# Patient Record
Sex: MALE | Race: WHITE | NOT HISPANIC OR LATINO | Employment: FULL TIME | URBAN - METROPOLITAN AREA
[De-identification: names, ages, dates, MRNs, and addresses within clinical notes are randomized per-mention and may not be internally consistent; named-entity substitution may affect disease eponyms.]

---

## 2019-08-10 ENCOUNTER — APPOINTMENT (EMERGENCY)
Dept: CT IMAGING | Facility: HOSPITAL | Age: 44
End: 2019-08-10
Payer: COMMERCIAL

## 2019-08-10 ENCOUNTER — HOSPITAL ENCOUNTER (EMERGENCY)
Facility: HOSPITAL | Age: 44
Discharge: HOME | End: 2019-08-10
Attending: EMERGENCY MEDICINE | Admitting: EMERGENCY MEDICINE
Payer: COMMERCIAL

## 2019-08-10 VITALS
SYSTOLIC BLOOD PRESSURE: 138 MMHG | RESPIRATION RATE: 18 BRPM | TEMPERATURE: 98.8 F | DIASTOLIC BLOOD PRESSURE: 87 MMHG | OXYGEN SATURATION: 96 % | HEART RATE: 67 BPM | WEIGHT: 205.25 LBS

## 2019-08-10 DIAGNOSIS — K52.9 ENTERITIS: Primary | ICD-10-CM

## 2019-08-10 LAB
ALBUMIN SERPL BCP-MCNC: 4.1 G/DL (ref 3.5–5)
ALP SERPL-CCNC: 77 U/L (ref 46–116)
ALT SERPL W P-5'-P-CCNC: 38 U/L (ref 12–78)
ANION GAP SERPL CALCULATED.3IONS-SCNC: 10 MMOL/L (ref 4–13)
AST SERPL W P-5'-P-CCNC: 20 U/L (ref 5–45)
BACTERIA UR QL AUTO: ABNORMAL /HPF
BASOPHILS # BLD AUTO: 0.05 THOUSANDS/ΜL (ref 0–0.1)
BASOPHILS NFR BLD AUTO: 0 % (ref 0–1)
BILIRUB SERPL-MCNC: 0.7 MG/DL (ref 0.2–1)
BILIRUB UR QL STRIP: NEGATIVE
BUN SERPL-MCNC: 17 MG/DL (ref 5–25)
CALCIUM SERPL-MCNC: 9.9 MG/DL (ref 8.3–10.1)
CHLORIDE SERPL-SCNC: 104 MMOL/L (ref 100–108)
CLARITY UR: CLEAR
CO2 SERPL-SCNC: 26 MMOL/L (ref 21–32)
COLOR UR: YELLOW
CREAT SERPL-MCNC: 0.86 MG/DL (ref 0.6–1.3)
EOSINOPHIL # BLD AUTO: 0.05 THOUSAND/ΜL (ref 0–0.61)
EOSINOPHIL NFR BLD AUTO: 0 % (ref 0–6)
ERYTHROCYTE [DISTWIDTH] IN BLOOD BY AUTOMATED COUNT: 12.1 % (ref 11.6–15.1)
GFR SERPL CREATININE-BSD FRML MDRD: 107 ML/MIN/1.73SQ M
GLUCOSE SERPL-MCNC: 120 MG/DL (ref 65–140)
GLUCOSE UR STRIP-MCNC: NEGATIVE MG/DL
HCT VFR BLD AUTO: 48.9 % (ref 36.5–49.3)
HGB BLD-MCNC: 17 G/DL (ref 12–17)
HGB UR QL STRIP.AUTO: ABNORMAL
IMM GRANULOCYTES # BLD AUTO: 0.07 THOUSAND/UL (ref 0–0.2)
IMM GRANULOCYTES NFR BLD AUTO: 1 % (ref 0–2)
KETONES UR STRIP-MCNC: ABNORMAL MG/DL
LEUKOCYTE ESTERASE UR QL STRIP: NEGATIVE
LIPASE SERPL-CCNC: 105 U/L (ref 73–393)
LYMPHOCYTES # BLD AUTO: 1.06 THOUSANDS/ΜL (ref 0.6–4.47)
LYMPHOCYTES NFR BLD AUTO: 8 % (ref 14–44)
MCH RBC QN AUTO: 30.6 PG (ref 26.8–34.3)
MCHC RBC AUTO-ENTMCNC: 34.8 G/DL (ref 31.4–37.4)
MCV RBC AUTO: 88 FL (ref 82–98)
MONOCYTES # BLD AUTO: 0.84 THOUSAND/ΜL (ref 0.17–1.22)
MONOCYTES NFR BLD AUTO: 6 % (ref 4–12)
MUCOUS THREADS UR QL AUTO: ABNORMAL
NEUTROPHILS # BLD AUTO: 12.09 THOUSANDS/ΜL (ref 1.85–7.62)
NEUTS SEG NFR BLD AUTO: 85 % (ref 43–75)
NITRITE UR QL STRIP: NEGATIVE
NON-SQ EPI CELLS URNS QL MICRO: ABNORMAL /HPF
NRBC BLD AUTO-RTO: 0 /100 WBCS
PH UR STRIP.AUTO: 6.5 [PH] (ref 4.5–8)
PLATELET # BLD AUTO: 205 THOUSANDS/UL (ref 149–390)
PMV BLD AUTO: 9.5 FL (ref 8.9–12.7)
POTASSIUM SERPL-SCNC: 4 MMOL/L (ref 3.5–5.3)
PROT SERPL-MCNC: 7.9 G/DL (ref 6.4–8.2)
PROT UR STRIP-MCNC: ABNORMAL MG/DL
RBC # BLD AUTO: 5.55 MILLION/UL (ref 3.88–5.62)
RBC #/AREA URNS AUTO: ABNORMAL /HPF
SODIUM SERPL-SCNC: 140 MMOL/L (ref 136–145)
SP GR UR STRIP.AUTO: 1.02 (ref 1–1.03)
UROBILINOGEN UR QL STRIP.AUTO: 0.2 E.U./DL
WBC # BLD AUTO: 14.16 THOUSAND/UL (ref 4.31–10.16)
WBC #/AREA URNS AUTO: ABNORMAL /HPF

## 2019-08-10 PROCEDURE — 74177 CT ABD & PELVIS W/CONTRAST: CPT

## 2019-08-10 PROCEDURE — 99284 EMERGENCY DEPT VISIT MOD MDM: CPT

## 2019-08-10 PROCEDURE — 85025 COMPLETE CBC W/AUTO DIFF WBC: CPT | Performed by: EMERGENCY MEDICINE

## 2019-08-10 PROCEDURE — 36415 COLL VENOUS BLD VENIPUNCTURE: CPT | Performed by: EMERGENCY MEDICINE

## 2019-08-10 PROCEDURE — 81001 URINALYSIS AUTO W/SCOPE: CPT

## 2019-08-10 PROCEDURE — 80053 COMPREHEN METABOLIC PANEL: CPT | Performed by: EMERGENCY MEDICINE

## 2019-08-10 PROCEDURE — 83690 ASSAY OF LIPASE: CPT | Performed by: EMERGENCY MEDICINE

## 2019-08-10 PROCEDURE — 99284 EMERGENCY DEPT VISIT MOD MDM: CPT | Performed by: EMERGENCY MEDICINE

## 2019-08-10 RX ORDER — BISOPROLOL FUMARATE AND HYDROCHLOROTHIAZIDE 5; 6.25 MG/1; MG/1
1 TABLET ORAL
COMMUNITY

## 2019-08-10 RX ORDER — ALPRAZOLAM 0.5 MG/1
0.5 TABLET ORAL
COMMUNITY

## 2019-08-10 RX ADMIN — IOHEXOL 100 ML: 350 INJECTION, SOLUTION INTRAVENOUS at 11:43

## 2019-08-10 NOTE — ED PROVIDER NOTES
History  Chief Complaint   Patient presents with    Abdominal Pain     sent from  Urgent Care for further evaluation of mid abd pain and nausea, started early this am , woke pt from sleep   Patient is a 44-year-old male with history only of hypertension who presents to the emergency department for evaluation with abdominal discomfort  He notes that this started at 02:00 waking him today  He gestures to the central abdominal region and describes a stabbing and twisting sensation  He notes that the discomfort has improved-having initially been a 7/10 and now much milder discomfort  He was unable to find any comfortable position after the pain started and was unable to return to sleep  He did have nausea and dry heaves  Appetite has been absent today  He did not have any abnormal bowel movements or urinations  He does not believe he has had any fevers  He did consume pork chops last night  Others ate the same food and feel well  He is not aware of any bad food ingestions  He does not have any history of abdominal problems though suspects that he does have an umbilical hernia as he has noted some slight protrusion at the site over the last 1 year  No history of abdominal surgeries  He has not taken any medication for his discomfort and did initially [facility] after which she was referred in for additional evaluation with concern for possible appendicitis or alternate cause  Prior to Admission Medications   Prescriptions Last Dose Informant Patient Reported? Taking? ALPRAZolam (XANAX) 0 5 mg tablet 8/9/2019 at Unknown time  Yes Yes   Sig: Take 0 5 mg by mouth daily at bedtime as needed for anxiety   bisoprolol-hydrochlorothiazide (ZIAC) 5-6 25 MG per tablet 8/10/2019 at Unknown time  Yes Yes   Sig: Take 1 tablet by mouth daily      Facility-Administered Medications: None       Past Medical History:   Diagnosis Date    Hypertension        History reviewed   No pertinent surgical history  History reviewed  No pertinent family history  I have reviewed and agree with the history as documented  Social History     Tobacco Use    Smoking status: Current Every Day Smoker    Smokeless tobacco: Never Used   Substance Use Topics    Alcohol use: Not Currently    Drug use: Never        Review of Systems   All other systems reviewed and are negative  Physical Exam  Physical Exam   Constitutional: He is oriented to person, place, and time  He appears well-developed and well-nourished  Eyes: Conjunctivae and EOM are normal  No scleral icterus  Cardiovascular: Normal rate and regular rhythm  Pulmonary/Chest: Effort normal and breath sounds normal    Abdominal: Normal appearance and bowel sounds are normal  He exhibits no distension  There is tenderness in the right lower quadrant and epigastric area  There is no guarding and no CVA tenderness  Musculoskeletal: Normal range of motion  He exhibits no edema or tenderness  Neurological: He is alert and oriented to person, place, and time  Skin: Skin is warm and dry  Psychiatric: He has a normal mood and affect  His behavior is normal    Nursing note and vitals reviewed        Vital Signs  ED Triage Vitals [08/10/19 1028]   Temperature Pulse Respirations Blood Pressure SpO2   98 8 °F (37 1 °C) 67 18 138/87 96 %      Temp Source Heart Rate Source Patient Position - Orthostatic VS BP Location FiO2 (%)   Oral Monitor Lying Right arm --      Pain Score       --           Vitals:    08/10/19 1028   BP: 138/87   Pulse: 67   Patient Position - Orthostatic VS: Lying         Visual Acuity      ED Medications  Medications   iohexol (OMNIPAQUE) 350 MG/ML injection (MULTI-DOSE) 100 mL (100 mL Intravenous Given 8/10/19 1143)       Diagnostic Studies  Results Reviewed     Procedure Component Value Units Date/Time    Urine Microscopic [[id number]]  (Abnormal) Collected:  08/10/19 1129    Lab Status:  Final result Specimen:  Urine, Clean Catch Updated:  08/10/19 1157     RBC, UA 0-1 /hpf      WBC, UA None Seen /hpf      Epithelial Cells Occasional /hpf      Bacteria, UA Occasional /hpf      MUCUS THREADS Occasional    ED Urine Macroscopic [[id number]]  (Abnormal) Collected:  08/10/19 1129    Lab Status:  Final result Specimen:  Urine Updated:  08/10/19 1118     Color, UA Yellow     Clarity, UA Clear     pH, UA 6 5     Leukocytes, UA Negative     Nitrite, UA Negative     Protein, UA 30 (1+) mg/dl      Glucose, UA Negative mg/dl      Ketones, UA 15 (1+) mg/dl      Urobilinogen, UA 0 2 E U /dl      Bilirubin, UA Negative     Blood, UA Trace     Specific [place], UA 1 020    Narrative:       CLINITEK RESULT    Comprehensive metabolic panel [[id number]] Collected:  08/10/19 1052    Lab Status:  Final result Specimen:  Blood from Arm, Right Updated:  08/10/19 1111     Sodium 140 mmol/L      Potassium 4 0 mmol/L      Chloride 104 mmol/L      CO2 26 mmol/L      ANION GAP 10 mmol/L      BUN 17 mg/dL      Creatinine 0 86 mg/dL      Glucose 120 mg/dL      Calcium 9 9 mg/dL      AST 20 U/L      ALT 38 U/L      Alkaline Phosphatase 77 U/L      Total Protein 7 9 g/dL      Albumin 4 1 g/dL      Total Bilirubin 0 70 mg/dL      eGFR 107 ml/min/1 73sq m     Narrative:       [place] guidelines for Chronic Kidney Disease (CKD):     Stage 1 with normal or high GFR (GFR > 90 mL/min/1 73 square meters)    Stage 2 Mild CKD (GFR = 60-89 mL/min/1 73 square meters)    Stage 3A Moderate CKD (GFR = 45-59 mL/min/1 73 square meters)    Stage 3B Moderate CKD (GFR = 30-44 mL/min/1 73 square meters)    Stage 4 Severe CKD (GFR = 15-29 mL/min/1 73 square meters)    Stage 5 End Stage CKD (GFR <15 mL/min/1 73 square meters)  Note: GFR calculation is accurate only with a steady state creatinine    Lipase [[id number]]  (Normal) Collected:  08/10/19 1052    Lab Status:  Final result Specimen:  Blood from Arm, Right Updated:  08/10/19 1111     Lipase 105 u/L CBC and differential [[id number]]  (Abnormal) Collected:  08/10/19 1052    Lab Status:  Final result Specimen:  Blood from Arm, Right Updated:  08/10/19 1059     WBC 14 16 Thousand/uL      RBC 5 55 Million/uL      Hemoglobin 17 0 g/dL      Hematocrit 48 9 %      MCV 88 fL      MCH 30 6 pg      MCHC 34 8 g/dL      RDW 12 1 %      MPV 9 5 fL      Platelets 917 Thousands/uL      nRBC 0 /100 WBCs      Neutrophils Relative 85 %      Immat GRANS % 1 %      Lymphocytes Relative 8 %      Monocytes Relative 6 %      Eosinophils Relative 0 %      Basophils Relative 0 %      Neutrophils Absolute 12 09 Thousands/µL      Immature Grans Absolute 0 07 Thousand/uL      Lymphocytes Absolute 1 06 Thousands/µL      Monocytes Absolute 0 84 Thousand/µL      Eosinophils Absolute 0 05 Thousand/µL      Basophils Absolute 0 05 Thousands/µL                  CT abdomen pelvis with contrast   Final Result by Deloris Barker MD (08/10 1200)   Mild wall thickening of proximal small bowel suspicious for enteritis, likely infectious                     Workstation performed: [id number]                    Procedures  Procedures       ED Course      patient reported continued improvement in symptoms  I spoke with him regarding study results revealing evidence of enteritis  Patient aware to gradually advance his diet  He declines antiemetic prescription  Although no PCP is listed in his chart he assures that he does have one & will contact them if he is having any worsening in symptoms                            MDM    Disposition  Final diagnoses:   Enteritis     Time reflects when diagnosis was documented in both MDM as applicable and the Disposition within this note     Time User Action Codes Description Comment    8/10/2019 12:04 PM Emily LOCKETT Add [K52 9] Enteritis       ED Disposition     ED Disposition Condition Date/Time Comment    Discharge Stable Sat Aug 10, 2019 12:04  [address] discharge to home/self care             Follow-up Information     Follow up With Specialties Details Why Contact Info    your primary care physician   As needed if not improving over the next week           Discharge Medication List as of 8/10/2019 12:11 PM      CONTINUE these medications which have NOT CHANGED    Details   ALPRAZolam (XANAX) 0 5 mg tablet Take 0 5 mg by mouth daily at bedtime as needed for anxiety, Historical Med      bisoprolol-hydrochlorothiazide (ZIAC) 5-6 25 MG per tablet Take 1 tablet by mouth daily, Historical Med           No discharge procedures on file      ED Provider  Electronically Signed by           Isiah Hart MD  08/10/19 3399

## 2019-08-10 NOTE — DISCHARGE INSTRUCTIONS
Drink plenty of fluids to stay well hydrated  Gradually advance your diet to plain foods & ultimately regular foods over the next few days as you feel improved  Enteritis   WHAT YOU NEED TO KNOW:   Enteritis is inflammation of the small intestine  It may be caused by eating foods or drinking liquids contaminated with a virus, bacteria, or parasites  It may also be caused by certain medicines, damage from radiation, and medical conditions such as Crohn disease  DISCHARGE INSTRUCTIONS:   Seek care immediately if:   You cannot stop vomiting  You have not urinated for 12 hours  Contact your healthcare provider if:   You have a fever over 101 5  You have blood or mucus in your bowel movements  You continue to vomit or have diarrhea for more than 3 days, even after treatment  You have a dry mouth and eyes, you are urinating less than usual, and you feel dizzy when you stand up  Your mouth or eyes are dry  You are not urinating as much or as often  You are losing weight without trying  You have questions or concerns about your condition or care  Medicines:   Medicines  may be given to fight an infection caused by bacteria or a parasite  You may also need medicines to slow or stop your diarrhea or vomiting  Do not take these medicines unless your healthcare provider say it is okay  Other medicines may be needed to treat medical conditions that are causing enteritis  Take your medicine as directed  Contact your healthcare provider if you think your medicine is not helping or if you have side effects  Tell him of her if you are allergic to any medicine  Keep a list of the medicines, vitamins, and herbs you take  Include the amounts, and when and why you take them  Bring the list or the pill bottles to follow-up visits  Carry your medicine list with you in case of an emergency  Manage enteritis:   Eat foods that help to decrease symptoms    Limit or avoid foods and liquids that are high in sugar, fat, and fiber to help relieve diarrhea  It may be helpful to avoid lactose  Lactose is a type of sugar that is found in milk products  You may be able to tolerate soups, broths, well-cooked vegetables, canned fruit, and baked or broiled meats  Ask your dietitian or healthcare provider if you should follow a special diet  You may need to avoid other foods if you have certain medical conditions such as celiac disease  Drink liquids as directed  Ask how much liquid to drink each day and which liquids are best for you  It is important to prevent or treat dehydration  Even if you have been vomiting, suck on ice chips or take small sips of clear liquids often  Slowly increase the amount of clear liquids you drink  If you become dehydrated, you may need IV liquids  Drink an oral rehydration solution (ORS) as directed  An ORS contains water, salts, and sugar that are needed to replace lost body fluids  Ask what kind of ORS to use, how much to drink, and where to get it  Prevent enteritis:  Enteritis that is caused by bacteria, parasites, or viruses can be prevented  The following may help to prevent this type of enteritis:  Wash your hands often  Use soap and water  Wash your hands after you use the bathroom, change a child's diapers, or sneeze  Wash your hands before you prepare or eat food  Clean surfaces and do laundry often  Wash your clothes and towels separately from the rest of the laundry  Clean surfaces in your home with antibacterial  or bleach  Clean food thoroughly and cook safely  Wash raw vegetables before you cook  Cook meat, fish, and eggs fully  Do not use the same dishes for raw meat as you do for other foods  Refrigerate any leftover food immediately  Be aware when you camp or travel  Drink only clean water  Do not drink from rivers or lakes unless you purify or boil the water first  When you travel, drink bottled water and do not add ice   Do not eat fruit that has not been peeled  Do not eat raw fish or meat that is not fully cooked  Follow up with your healthcare provider as directed:  Write down your questions so you remember to ask them during your visits  © 2017 [employer] Information is for End User's use only and may not be sold, redistributed or otherwise used for commercial purposes  All illustrations and images included in CareNotes® are the copyrighted property of A [employer]  or [employer]  The above information is an  only  It is not intended as medical advice for individual conditions or treatments  Talk to your doctor, nurse or pharmacist before following any medical regimen to see if it is safe and effective for you

## 2021-03-10 ENCOUNTER — APPOINTMENT (OUTPATIENT)
Dept: LAB | Facility: MEDICAL CENTER | Age: 46
End: 2021-03-10
Payer: COMMERCIAL

## 2021-03-10 DIAGNOSIS — H93.13 TINNITUS AURIUM, BILATERAL: ICD-10-CM

## 2021-03-10 DIAGNOSIS — E66.3 OVERWEIGHT: ICD-10-CM

## 2021-03-10 DIAGNOSIS — H90.3 BILATERAL HIGH FREQUENCY SENSORINEURAL HEARING LOSS: ICD-10-CM

## 2021-03-10 DIAGNOSIS — Z23 ENCOUNTER FOR IMMUNIZATION: ICD-10-CM

## 2021-03-10 LAB
ALBUMIN SERPL BCP-MCNC: 4.1 G/DL (ref 3.5–5)
ALP SERPL-CCNC: 78 U/L (ref 46–116)
ALT SERPL W P-5'-P-CCNC: 39 U/L (ref 12–78)
ANION GAP SERPL CALCULATED.3IONS-SCNC: 5 MMOL/L (ref 4–13)
AST SERPL W P-5'-P-CCNC: 17 U/L (ref 5–45)
BASOPHILS # BLD AUTO: 0.04 THOUSANDS/ΜL (ref 0–0.1)
BASOPHILS NFR BLD AUTO: 1 % (ref 0–1)
BILIRUB SERPL-MCNC: 0.79 MG/DL (ref 0.2–1)
BUN SERPL-MCNC: 16 MG/DL (ref 5–25)
CALCIUM SERPL-MCNC: 9.2 MG/DL (ref 8.3–10.1)
CHLORIDE SERPL-SCNC: 108 MMOL/L (ref 100–108)
CHOLEST SERPL-MCNC: 204 MG/DL (ref 50–200)
CO2 SERPL-SCNC: 27 MMOL/L (ref 21–32)
CREAT SERPL-MCNC: 1.01 MG/DL (ref 0.6–1.3)
EOSINOPHIL # BLD AUTO: 0.23 THOUSAND/ΜL (ref 0–0.61)
EOSINOPHIL NFR BLD AUTO: 3 % (ref 0–6)
ERYTHROCYTE [DISTWIDTH] IN BLOOD BY AUTOMATED COUNT: 12.3 % (ref 11.6–15.1)
GFR SERPL CREATININE-BSD FRML MDRD: 91 ML/MIN/1.73SQ M
GLUCOSE P FAST SERPL-MCNC: 105 MG/DL (ref 65–99)
HCT VFR BLD AUTO: 49.5 % (ref 36.5–49.3)
HDLC SERPL-MCNC: 34 MG/DL
HGB BLD-MCNC: 16.7 G/DL (ref 12–17)
IMM GRANULOCYTES # BLD AUTO: 0.03 THOUSAND/UL (ref 0–0.2)
IMM GRANULOCYTES NFR BLD AUTO: 0 % (ref 0–2)
LDLC SERPL CALC-MCNC: 135 MG/DL (ref 0–100)
LYMPHOCYTES # BLD AUTO: 1.57 THOUSANDS/ΜL (ref 0.6–4.47)
LYMPHOCYTES NFR BLD AUTO: 22 % (ref 14–44)
MCH RBC QN AUTO: 30.1 PG (ref 26.8–34.3)
MCHC RBC AUTO-ENTMCNC: 33.7 G/DL (ref 31.4–37.4)
MCV RBC AUTO: 89 FL (ref 82–98)
MONOCYTES # BLD AUTO: 0.65 THOUSAND/ΜL (ref 0.17–1.22)
MONOCYTES NFR BLD AUTO: 9 % (ref 4–12)
NEUTROPHILS # BLD AUTO: 4.67 THOUSANDS/ΜL (ref 1.85–7.62)
NEUTS SEG NFR BLD AUTO: 65 % (ref 43–75)
NONHDLC SERPL-MCNC: 170 MG/DL
NRBC BLD AUTO-RTO: 0 /100 WBCS
PLATELET # BLD AUTO: 205 THOUSANDS/UL (ref 149–390)
PMV BLD AUTO: 10.3 FL (ref 8.9–12.7)
POTASSIUM SERPL-SCNC: 4.1 MMOL/L (ref 3.5–5.3)
PROT SERPL-MCNC: 7.7 G/DL (ref 6.4–8.2)
RBC # BLD AUTO: 5.55 MILLION/UL (ref 3.88–5.62)
SODIUM SERPL-SCNC: 140 MMOL/L (ref 136–145)
TRIGL SERPL-MCNC: 177 MG/DL
WBC # BLD AUTO: 7.19 THOUSAND/UL (ref 4.31–10.16)

## 2021-03-10 PROCEDURE — 85025 COMPLETE CBC W/AUTO DIFF WBC: CPT

## 2021-03-10 PROCEDURE — 80053 COMPREHEN METABOLIC PANEL: CPT

## 2021-03-10 PROCEDURE — 80061 LIPID PANEL: CPT

## 2021-03-10 PROCEDURE — 36415 COLL VENOUS BLD VENIPUNCTURE: CPT

## 2021-09-13 ENCOUNTER — HOSPITAL ENCOUNTER (OUTPATIENT)
Dept: CT IMAGING | Facility: HOSPITAL | Age: 46
Discharge: HOME | End: 2021-09-13
Attending: INTERNAL MEDICINE
Payer: COMMERCIAL

## 2021-09-13 ENCOUNTER — APPOINTMENT (OUTPATIENT)
Dept: LAB | Facility: CLINIC | Age: 46
End: 2021-09-13
Payer: COMMERCIAL

## 2021-09-13 ENCOUNTER — CONSULT (OUTPATIENT)
Dept: HEMATOLOGY ONCOLOGY | Facility: CLINIC | Age: 46
End: 2021-09-13
Payer: COMMERCIAL

## 2021-09-13 VITALS
RESPIRATION RATE: 16 BRPM | OXYGEN SATURATION: 96 % | DIASTOLIC BLOOD PRESSURE: 108 MMHG | HEIGHT: 67 IN | BODY MASS INDEX: 29.98 KG/M2 | SYSTOLIC BLOOD PRESSURE: 142 MMHG | WEIGHT: 191 LBS | TEMPERATURE: 97.2 F | HEART RATE: 65 BPM

## 2021-09-13 DIAGNOSIS — R59.0 MEDIASTINAL LYMPHADENOPATHY: ICD-10-CM

## 2021-09-13 DIAGNOSIS — R59.0 MEDIASTINAL LYMPHADENOPATHY: Primary | ICD-10-CM

## 2021-09-13 DIAGNOSIS — C83.30 DIFFUSE LARGE B-CELL LYMPHOMA, UNSPECIFIED BODY REGION (HCC): ICD-10-CM

## 2021-09-13 LAB
AFP-TM SERPL-MCNC: 3.8 NG/ML (ref 0.5–8)
ALBUMIN SERPL BCP-MCNC: 3.8 G/DL (ref 3.5–5)
ALP SERPL-CCNC: 143 U/L (ref 46–116)
ALT SERPL W P-5'-P-CCNC: 40 U/L (ref 12–78)
ANION GAP SERPL CALCULATED.3IONS-SCNC: 7 MMOL/L (ref 4–13)
AST SERPL W P-5'-P-CCNC: 14 U/L (ref 5–45)
BASOPHILS # BLD AUTO: 0.08 THOUSANDS/ΜL (ref 0–0.1)
BASOPHILS NFR BLD AUTO: 1 % (ref 0–1)
BILIRUB SERPL-MCNC: 0.53 MG/DL (ref 0.2–1)
BUN SERPL-MCNC: 12 MG/DL (ref 5–25)
CALCIUM SERPL-MCNC: 9.6 MG/DL (ref 8.3–10.1)
CHLORIDE SERPL-SCNC: 102 MMOL/L (ref 100–108)
CO2 SERPL-SCNC: 30 MMOL/L (ref 21–32)
CREAT SERPL-MCNC: 1.05 MG/DL (ref 0.6–1.3)
EOSINOPHIL # BLD AUTO: 0.55 THOUSAND/ΜL (ref 0–0.61)
EOSINOPHIL NFR BLD AUTO: 6 % (ref 0–6)
ERYTHROCYTE [DISTWIDTH] IN BLOOD BY AUTOMATED COUNT: 12 % (ref 11.6–15.1)
GFR SERPL CREATININE-BSD FRML MDRD: 86 ML/MIN/1.73SQ M
GLUCOSE SERPL-MCNC: 142 MG/DL (ref 65–140)
HBV SURFACE AG SER QL: NORMAL
HCG-TM SERPL-SCNC: <2 MLU/ML
HCT VFR BLD AUTO: 48 % (ref 36.5–49.3)
HCV AB SER QL: NORMAL
HGB BLD-MCNC: 15.9 G/DL (ref 12–17)
IMM GRANULOCYTES # BLD AUTO: 0.06 THOUSAND/UL (ref 0–0.2)
IMM GRANULOCYTES NFR BLD AUTO: 1 % (ref 0–2)
LDH SERPL-CCNC: 242 U/L (ref 81–234)
LYMPHOCYTES # BLD AUTO: 0.74 THOUSANDS/ΜL (ref 0.6–4.47)
LYMPHOCYTES NFR BLD AUTO: 8 % (ref 14–44)
MCH RBC QN AUTO: 28.4 PG (ref 26.8–34.3)
MCHC RBC AUTO-ENTMCNC: 33.1 G/DL (ref 31.4–37.4)
MCV RBC AUTO: 86 FL (ref 82–98)
MONOCYTES # BLD AUTO: 1.16 THOUSAND/ΜL (ref 0.17–1.22)
MONOCYTES NFR BLD AUTO: 12 % (ref 4–12)
NEUTROPHILS # BLD AUTO: 6.74 THOUSANDS/ΜL (ref 1.85–7.62)
NEUTS SEG NFR BLD AUTO: 72 % (ref 43–75)
NRBC BLD AUTO-RTO: 0 /100 WBCS
PLATELET # BLD AUTO: 354 THOUSANDS/UL (ref 149–390)
PMV BLD AUTO: 8.9 FL (ref 8.9–12.7)
POTASSIUM SERPL-SCNC: 4.3 MMOL/L (ref 3.5–5.3)
PROT SERPL-MCNC: 8.4 G/DL (ref 6.4–8.2)
RBC # BLD AUTO: 5.59 MILLION/UL (ref 3.88–5.62)
SODIUM SERPL-SCNC: 139 MMOL/L (ref 136–145)
WBC # BLD AUTO: 9.33 THOUSAND/UL (ref 4.31–10.16)

## 2021-09-13 PROCEDURE — 86803 HEPATITIS C AB TEST: CPT

## 2021-09-13 PROCEDURE — 36415 COLL VENOUS BLD VENIPUNCTURE: CPT

## 2021-09-13 PROCEDURE — 85025 COMPLETE CBC W/AUTO DIFF WBC: CPT

## 2021-09-13 PROCEDURE — 87389 HIV-1 AG W/HIV-1&-2 AB AG IA: CPT

## 2021-09-13 PROCEDURE — 82105 ALPHA-FETOPROTEIN SERUM: CPT

## 2021-09-13 PROCEDURE — 99245 OFF/OP CONSLTJ NEW/EST HI 55: CPT | Performed by: INTERNAL MEDICINE

## 2021-09-13 PROCEDURE — 80053 COMPREHEN METABOLIC PANEL: CPT

## 2021-09-13 PROCEDURE — G1004 CDSM NDSC: HCPCS

## 2021-09-13 PROCEDURE — 84702 CHORIONIC GONADOTROPIN TEST: CPT

## 2021-09-13 PROCEDURE — 83615 LACTATE (LD) (LDH) ENZYME: CPT

## 2021-09-13 PROCEDURE — 87340 HEPATITIS B SURFACE AG IA: CPT

## 2021-09-13 PROCEDURE — 70491 CT SOFT TISSUE NECK W/DYE: CPT

## 2021-09-13 RX ORDER — PREDNISONE 50 MG/1
50 TABLET ORAL 2 TIMES DAILY WITH MEALS
Qty: 30 TABLET | Refills: 0 | Status: SHIPPED | OUTPATIENT
Start: 2021-09-13 | End: 2021-09-27 | Stop reason: SDUPTHER

## 2021-09-13 RX ADMIN — IOHEXOL 85 ML: 350 INJECTION, SOLUTION INTRAVENOUS at 11:34

## 2021-09-13 NOTE — PROGRESS NOTES
Oncology Consult Note  Steven Tolbert 42 y o  male MRN: [id number]  Unit/Bed#:  Encounter: [id number]      Presenting Complaint:  Mediastinal lymphadenopathy, supraclavicular lymphadenopathy  History of Presenting Illness:   44-year-old  male with history of hypertension, anxiety, had been feeling with intermittent pleurisy for the past few months, later on he had shortness of breath when he goes up stairs, in the past 1-2 weeks CEA had low-grade fever at night, night sweats, he lost about 5-10 lb    Chest x-ray on 09/10/2021  Widening of the mediastinum    CT scan of the chest showed large anterior mediastinal mass measuring 11 4 x 6 1 x 6 7 cm with multiple enlarged mediastinal lymph nodes for example a prevascular lymph node measuring 2 4 x 1 6 cm, right paratracheal lymph node measuring 1 4 x 2 8 cm, bilateral supraclavicular lymphadenopathy, subcentimeter right hilar lymphadenopathy no axillary lymph node enlargement     Denies any headache blurred vision diplopia angina abdominal pain dysuria hematuria melena hematochezia skin rash pruritus     He smokes half pack of cigarettes daily for many years, drinks alcohol occasionally    His father diagnosed with multiple myeloma       Review of Systems - As stated in the HPI otherwise the fourteen point review of systems was negative      Past Medical History:   Diagnosis Date    Hypertension        Social History     Socioeconomic History    Marital status: /Civil Union     Spouse name: None    Number of children: None    Years of education: None    Highest education level: None   Occupational History    None   Tobacco Use    Smoking status: Current Every Day Smoker    Smokeless tobacco: Never Used   Substance and Sexual Activity    Alcohol use: Yes     Comment: rarely    Drug use: Never    Sexual activity: None   Other Topics Concern    None   Social History Narrative    None     Social Determinants of Health     Financial Resource Strain:     Difficulty of Paying Living Expenses:    Food Insecurity:     Worried About Running Out of Food in the Last Year:     [address] in the Last Year:    Transportation Needs:     Lack of Transportation (Medical):  Lack of Transportation (Non-Medical):    Physical Activity:     Days of Exercise per Week:     Minutes of Exercise per Session:    Stress:     Feeling of Stress :    Social Connections:     Frequency of Communication with Friends and Family:     Frequency of Social Gatherings with Friends and Family:     Attends [employer] Services:     Active Member of Clubs or Organizations:     Attends Club or Organization Meetings:     Marital Status:    Intimate Partner Violence:     Fear of Current or Ex-Partner:     Emotionally Abused:     Physically Abused:     Sexually Abused:        Family History   Problem Relation Age of Onset    Hypertension Family     Cancer Family        No Known Allergies      Current Outpatient Medications:     ALPRAZolam (XANAX) 0 5 mg tablet, Take 0 5 mg by mouth daily at bedtime as needed for anxiety, Disp: , Rfl:     bisoprolol-hydrochlorothiazide (ZIAC) 5-6 25 MG per tablet, Take 1 tablet by mouth daily, Disp: , Rfl:     predniSONE 50 mg tablet, Take 1 tablet (50 mg total) by mouth 2 (two) times a day with meals, Disp: 30 tablet, Rfl: 0      BP (!) 142/108 (BP Location: Left arm, Patient Position: Sitting, Cuff Size: Adult)   Pulse 65   Temp (!) 97 2 °F (36 2 °C) (Temporal)   Resp 16   Ht 5' 7" (1 702 m)   Wt 86 6 kg (191 lb)   SpO2 96%   BMI 29 91 kg/m²       General Appearance:    Alert, oriented        Eyes:    PERRL   Ears:    Normal external ear canals, both ears   Nose:   Nares normal, septum midline   Throat:   Mucosa moist  Pharynx without injection      Neck:   Supple       Lungs:     Clear to auscultation bilaterally   Chest Wall:    No tenderness or deformity    Heart:    Regular rate and rhythm       Abdomen:     Soft, non-tender, bowel sounds +, no organomegaly           Extremities:   Extremities no cyanosis or edema       Skin:   no rash or icterus  Lymph nodes:   Palpable right supraclavicular lymph node measuring 1 1 x 2 cm otherwise,  Axillary, inguinal lymph nodes are within normal limits   Neurologic:   CNII-XII intact, normal strength, sensation and reflexes     Throughout               No results found for this or any previous visit (from the past 48 hour(s))  No results found    ECOG :0      Assessment and plan:     53-year-old  male with past medical history of hypertension, he smokes half pack of cigarettes daily for many years, presented with constitutional symptoms such as low-grade fever, night sweats, weight loss over the past few weeks, chest x-ray showed wide mediastinum, CT scan at [facility] on 09/2021 showed 11 x 6 1 x 6 7 cm anterior mediastinal mass, multiple enlarged mediastinal lymph nodes are present as well, bilateral supraclavicular lymphadenopathy as well, no evidence of axillary lymphadenopathy     Differential diagnosis includes lymphoproliferative disorder, sarcoidosis, germ cell tumor, thymoma    Will ask Surgical Oncology for excisional biopsy of 1 of the supraclavicular lymph nodes    Will order CBC, CMP, LDH, alpha fetoprotein, hCG, CT scan of the neck, HIV, hepatitis profile    PET scan as well after tissue biopsy to confirm diagnosis     Follow-up in 2 weeks     After excisional biopsy patient to take prednisone 50 mg p o  b i d  for 5 days    Will obtain CT scan of the chest CD from [facility]

## 2021-09-14 ENCOUNTER — CONSULT (OUTPATIENT)
Dept: SURGICAL ONCOLOGY | Facility: CLINIC | Age: 46
End: 2021-09-14
Payer: COMMERCIAL

## 2021-09-14 ENCOUNTER — ANESTHESIA EVENT (OUTPATIENT)
Dept: PERIOP | Facility: HOSPITAL | Age: 46
End: 2021-09-14
Payer: COMMERCIAL

## 2021-09-14 ENCOUNTER — TELEPHONE (OUTPATIENT)
Dept: HEMATOLOGY ONCOLOGY | Facility: CLINIC | Age: 46
End: 2021-09-14

## 2021-09-14 VITALS
WEIGHT: 192 LBS | SYSTOLIC BLOOD PRESSURE: 118 MMHG | BODY MASS INDEX: 30.13 KG/M2 | TEMPERATURE: 98.8 F | HEART RATE: 93 BPM | HEIGHT: 67 IN | RESPIRATION RATE: 16 BRPM | DIASTOLIC BLOOD PRESSURE: 74 MMHG | OXYGEN SATURATION: 98 %

## 2021-09-14 DIAGNOSIS — R59.0 SUPRACLAVICULAR LYMPHADENOPATHY: Primary | ICD-10-CM

## 2021-09-14 PROCEDURE — 99244 OFF/OP CNSLTJ NEW/EST MOD 40: CPT | Performed by: STUDENT IN AN ORGANIZED HEALTH CARE EDUCATION/TRAINING PROGRAM

## 2021-09-14 RX ORDER — CEFAZOLIN SODIUM 2 G/50ML
2000 SOLUTION INTRAVENOUS ONCE
Status: CANCELLED | OUTPATIENT
Start: 2021-09-14 | End: 2021-09-14

## 2021-09-14 RX ORDER — TRAMADOL HYDROCHLORIDE 50 MG/1
50 TABLET ORAL EVERY 6 HOURS PRN
Qty: 12 TABLET | Refills: 0 | Status: SHIPPED | OUTPATIENT
Start: 2021-09-14 | End: 2021-10-06

## 2021-09-14 NOTE — PROGRESS NOTES
Surgical Oncology Consultation    [id number] [facility] CANCER CARE SURGICAL ONCOLOGY [facility]  [address] [address]     Patient:  Tawny Brady  1975  [id number]    Primary Care provider:  Theresa Alfaro MD   [address] [address]    Referring provider:  No referring provider defined for this encounter  Diagnoses and all orders for this visit:    Supraclavicular lymphadenopathy  -     Case request operating room: EXCISIONAL BIOPSY OF RIGHT NECK LYMPH NODE WITH LYMPHOMA PROTOCOL; Standing  -     traMADol (ULTRAM) 50 mg tablet; Take 1 tablet (50 mg total) by mouth every 6 (six) hours as needed for moderate pain  -     Case request operating room: EXCISIONAL BIOPSY OF RIGHT NECK LYMPH NODE WITH LYMPHOMA PROTOCOL    Other orders  -     Incentive spirometry; Standing  -     Insert and maintain IV line; Standing  -     Void On-Call to O R ; Standing  -     Place sequential compression device; Standing  -     ceFAZolin (ANCEF) IVPB (premix in dextrose) 2,000 mg 50 mL  -     Cancel: EKG 12 lead; Future        Chief Complaint   Patient presents with    Lymphadenopathy       No follow-ups on file  Oncology History    No history exists  History of Present Illness  : This is a 68-year-old gentleman with a new diagnosis of likely lymphoma  Briefly, couple weeks ago the patient was having some chest pressure, and underwent a chest x-ray revealing wide mediastinum  This was followed by CT scan, which revealed a large anterior mediastinal mass as well as bilateral supraclavicular lymphadenopathy  He saw Dr Hill motley, his suspected lymphoma diagnosis, and refer the patient to me for an excisional lymph node biopsy  The patient states that he has had significant fatigue over the last couple of weeks  He also has a decreased appetite with mild weight loss to 5 lb    He denies any night sweats, chills, fevers, bone pain, shortness of breath  Review of Systems  Complete ROS Surg Onc:   Constitutional: The ENDORSES recent history of general fatigue, mild recent weight loss, DECREASED appetite  Eyes: No complaints of visual problems, no scleral icterus  ENT: No complaints of ear pain, no hoarseness, no difficulty swallowing,  no tinnitus and no new masses in head, oral cavity, or neck  Cardiovascular: No complaints of chest pain, no palpitations, no ankle edema  Respiratory: No complaints of shortness of breath, no cough  Gastrointestinal: No complaints of jaundice, no bloody stools, no pale stools  Genitourinary: No complaints of dysuria, no hematuria, no nocturia, no frequent urination, no urethral discharge  Musculoskeletal: No complaints of weakness, paralysis, joint stiffness or arthralgias  Integumentary: No complaints of rash, no new lesions  Neurological: No complaints of convulsions, no seizures, no dizziness  Hematologic/Lymphatic: No complaints of easy bruising  Endocrine:  No hot or cold intolerance  No polydipsia, polyphagia, or polyuria  Allergy/immunology:  No environmental allergies  No food allergies  Not immunocompromised        Patient Active Problem List   Diagnosis    Supraclavicular lymphadenopathy     Past Medical History:   Diagnosis Date    Hypertension      Past Surgical History:   Procedure Laterality Date    TOOTH EXTRACTION  02/16/2021     Family History   Problem Relation Age of Onset    Hypertension Family     Cancer Family      Social History     Socioeconomic History    Marital status: /Civil Union     Spouse name: Not on file    Number of children: Not on file    Years of education: Not on file    Highest education level: Not on file   Occupational History    Not on file   Tobacco Use    Smoking status: Current Every Day Smoker    Smokeless tobacco: Never Used   Substance and Sexual Activity    Alcohol use: Yes     Comment: rarely    Drug use: Never    Sexual activity: Not on file   Other Topics Concern    Not on file   Social History Narrative    Not on file     Social Determinants of Health     Financial Resource Strain:     Difficulty of Paying Living Expenses:    Food Insecurity:     Worried About Running Out of Food in the Last Year:     [address] in the Last Year:    Transportation Needs:     Lack of Transportation (Medical):  Lack of Transportation (Non-Medical):    Physical Activity:     Days of Exercise per Week:     Minutes of Exercise per Session:    Stress:     Feeling of Stress :    Social Connections:     Frequency of Communication with Friends and Family:     Frequency of Social Gatherings with Friends and Family:     Attends Denominational Services:     Active Member of Clubs or Organizations:     Attends Club or Organization Meetings:     Marital Status:    Intimate Partner Violence:     Fear of Current or Ex-Partner:     Emotionally Abused:     Physically Abused:     Sexually Abused:        Current Outpatient Medications:     ALPRAZolam (XANAX) 0 5 mg tablet, Take 0 5 mg by mouth daily at bedtime as needed for anxiety, Disp: , Rfl:     bisoprolol-hydrochlorothiazide (ZIAC) 5-6 25 MG per tablet, Take 1 tablet by mouth daily, Disp: , Rfl:     predniSONE 50 mg tablet, Take 1 tablet (50 mg total) by mouth 2 (two) times a day with meals (Patient not taking: Reported on 9/14/2021), Disp: 30 tablet, Rfl: 0    traMADol (ULTRAM) 50 mg tablet, Take 1 tablet (50 mg total) by mouth every 6 (six) hours as needed for moderate pain, Disp: 12 tablet, Rfl: 0  No current facility-administered medications for this visit    No Known Allergies    Vitals:    09/14/21 0855   BP: 118/74   Pulse: 93   Resp: 16   Temp: 98 8 °F (37 1 °C)   SpO2: 98%       Physical Exam   General: Appears well, appears stated age  Skin: Warm, anicteric  HEENT: Normocephalic, atraumatic; sclera aniceteric, mucous membranes moist; cervical nodes without adenopathy  Cardiopulmonary: RRR, Easy WOB, no BLE edema  Abd: Flat and soft, nontender, no masses appreciated, no hepatosplenomegaly  MSK: Symmetric, no cyanosis, no overt weakness  Lymphatic: No cervical, axillary or inguinal lymphadenopathy  Right supraclavicular node palpated at the medial aspect of the clavicle  Neuro: Affect appropriate, no gross motor abnormalities      Labs: Reviewed in EPIC    Imaging  CT soft tissue neck w contrast    Result Date: 9/13/2021  Narrative: CT NECK WITH CONTRAST INDICATION:   R59 0: Localized enlarged lymph nodes  COMPARISON:  None  TECHNIQUE:  Axial, sagittal, and coronal 2D reformatted images were created from the axial source data and submitted for interpretation  Radiation dose length product (DLP) for this visit:  891 mGy-cm   This examination, like all CT scans performed in the [facility], was performed utilizing techniques to minimize radiation dose exposure, including the use of iterative reconstruction and automated exposure control  IV Contrast:  85 mL of iohexol (OMNIPAQUE) IMAGE QUALITY:  Diagnostic  FINDINGS: VISUALIZED BRAIN PARENCHYMA:  No acute intracranial pathology of the visualized brain parenchyma  VISUALIZED ORBITS AND PARANASAL SINUSES:  Unremarkable orbits  Mild mucosal thickening of the maxillary sinuses  NASAL CAVITY AND NASOPHARYNX:  Normal  SUPRAHYOID NECK:  Normal oral cavity, tongue base, tonsillar fossa and epiglottis  INFRAHYOID NECK:  Aryepiglottic folds and piriform sinuses are normal   Normal glottis and subglottic airway  THYROID GLAND:  Unremarkable  PAROTID AND SUBMANDIBULAR GLANDS:  Normal  LYMPH NODES:  Within the neck there are small supraclavicular nodes demonstrating slightly heterogeneous enhancement  The largest of these on the left measures 2 4 x 1 5 cm and the largest on the right measures 2 2 x 1 8 cm  Small posterior jugular chain nodes are identified within the left neck, level 5   Evaluation of the upper mediastinum demonstrates a large heterogeneously enhancing mass within the anterior superior mediastinum, likely a large elvia mass  Multiple additional mediastinal nodes are seen in the anterior mediastinum, paratracheal, prevascular regions as well as suprahilar regions  VASCULAR STRUCTURES:  Normal enhancement of the cervical vasculature  THORACIC INLET:  See above description of mediastinal masses/adenopathy  The upper lung fields are clear  BONY STRUCTURES: No acute osseous abnormality  Impression: There are multiple supraclavicular nodes identified bilaterally measuring up to 2 4 cm in long axis  Additional small posterior jugular chain/level 5 nodes are present  Large mediastinal mass within the anterior superior mediastinum with multiple surrounding slightly heterogeneously enhancing enlarged lymph nodes extending inferiorly beyond the scope of this exam   Differential considerations would include lymphoma as well as other neoplastic etiologies  Prior outside imaging of the chest is not immediately available for comparison  No imaging of the abdomen or pelvis recently  Tissue sampling recommended  Workstation performed: [id number]       I independently reviewed and interpreted the above laboratory and imaging data  Discussion/Summary:    72-year-old gentleman with a diagnosis of an anterior mediastinal mass and significant cervical lymphadenopathy, most likely consistent with a lymphoma  The patient has been referred for an excisional lymph node biopsy  The right supraclavicular node is easily palpable and I will perform an excisional biopsy of this node  Risks 7 explained to the patient and his father today to include infection, bleeding, transient numbness or tingling  All questions answered today we will proceed with excisional lymph node biopsy

## 2021-09-14 NOTE — ANESTHESIA PREPROCEDURE EVALUATION
Procedure:  EXCISIONAL BIOPSY OF RIGHT NECK LYMPH NODE WITH LYMPHOMA PROTOCOL (Right Neck)    Large mediastinal mass  No visible airway compression on CT    Relevant Problems   ANESTHESIA (within normal limits)      CARDIO   (+) Hypertension      ENDO (within normal limits)      GI/HEPATIC (within normal limits)      /RENAL (within normal limits)      NEURO/PSYCH (within normal limits)      PULMONARY   (+) Smoking      Other   (+) Supraclavicular lymphadenopathy        Physical Exam    Airway    Mallampati score: I  TM Distance: >3 FB  Neck ROM: full     Dental       Cardiovascular      Pulmonary      Other Findings        Anesthesia Plan  ASA Score- 2     Anesthesia Type- general with ASA Monitors  Additional Monitors:   Airway Plan: LMA  Plan Factors-Exercise tolerance (METS): >4 METS  Chart reviewed  Imaging results reviewed  Existing labs reviewed  Patient summary reviewed  Patient is a current smoker  Patient instructed to abstain from smoking on day of procedure  Patient did not smoke on day of surgery  Induction- intravenous  Postoperative Plan- Plan for postoperative opioid use  Informed Consent- Anesthetic plan and risks discussed with patient  I personally reviewed this patient with the CRNA  Discussed and agreed on the Anesthesia Plan with the CRNA  Naomi Peterson

## 2021-09-14 NOTE — TELEPHONE ENCOUNTER
Received call from patient PCP stating they received fax from Dr Medellin Six office, but it appeared dede  Reached out to surgery coordinator Verónica Hanks for additional information  Verónica Hanks stated they need patient last EKG  Provided that info patient PCP they stated they will send EKG over

## 2021-09-14 NOTE — LETTER
September 14, 2021     Jaden Charles MD  [address] [place] [address]  [address] [phone]    Patient: Kristina Funez   YOB: 1975   Date of Visit: 9/14/2021       Dear Dr Gail Thorne: Thank you for referring Silverio Ribeiro to me for evaluation  Below are my notes for this consultation  If you have questions, please do not hesitate to call me  I look forward to following your patient along with you  Sincerely,        Bam Mora MD        CC: No Recipients  Bam Mora MD  9/14/2021 10:30 AM  Sign when Signing Visit  Surgical Oncology Consultation    [address] [place] [address]    Patient:  Kristina Funez  1975  [id number]    Primary Care provider:  Amina Collado MD   [address] [phone]    Referring provider:  No referring provider defined for this encounter  Diagnoses and all orders for this visit:    Supraclavicular lymphadenopathy  -     Case request operating room: EXCISIONAL BIOPSY OF RIGHT NECK LYMPH NODE WITH LYMPHOMA PROTOCOL; Standing  -     traMADol (ULTRAM) 50 mg tablet; Take 1 tablet (50 mg total) by mouth every 6 (six) hours as needed for moderate pain  -     Case request operating room: EXCISIONAL BIOPSY OF RIGHT NECK LYMPH NODE WITH LYMPHOMA PROTOCOL    Other orders  -     Incentive spirometry; Standing  -     Insert and maintain IV line; Standing  -     Void On-Call to O R ; Standing  -     Place sequential compression device; Standing  -     ceFAZolin (ANCEF) IVPB (premix in dextrose) 2,000 mg 50 mL  -     Cancel: EKG 12 lead; Future        Chief Complaint   Patient presents with    Lymphadenopathy       No follow-ups on file  Oncology History    No history exists  History of Present Illness  : This is a 24-year-old gentleman with a new diagnosis of likely lymphoma    Briefly, couple weeks ago the patient was having some chest pressure, and underwent a chest x-ray revealing wide mediastinum  This was followed by CT scan, which revealed a large anterior mediastinal mass as well as bilateral supraclavicular lymphadenopathy  He saw Dr Hill motley, his suspected lymphoma diagnosis, and refer the patient to me for an excisional lymph node biopsy  The patient states that he has had significant fatigue over the last couple of weeks  He also has a decreased appetite with mild weight loss to 5 lb  He denies any night sweats, chills, fevers, bone pain, shortness of breath  Review of Systems  Complete ROS Surg Onc:   Constitutional: The ENDORSES recent history of general fatigue, mild recent weight loss, DECREASED appetite  Eyes: No complaints of visual problems, no scleral icterus  ENT: No complaints of ear pain, no hoarseness, no difficulty swallowing,  no tinnitus and no new masses in head, oral cavity, or neck  Cardiovascular: No complaints of chest pain, no palpitations, no ankle edema  Respiratory: No complaints of shortness of breath, no cough  Gastrointestinal: No complaints of jaundice, no bloody stools, no pale stools  Genitourinary: No complaints of dysuria, no hematuria, no nocturia, no frequent urination, no urethral discharge  Musculoskeletal: No complaints of weakness, paralysis, joint stiffness or arthralgias  Integumentary: No complaints of rash, no new lesions  Neurological: No complaints of convulsions, no seizures, no dizziness  Hematologic/Lymphatic: No complaints of easy bruising  Endocrine:  No hot or cold intolerance  No polydipsia, polyphagia, or polyuria  Allergy/immunology:  No environmental allergies  No food allergies  Not immunocompromised        Patient Active Problem List   Diagnosis    Supraclavicular lymphadenopathy     Past Medical History:   Diagnosis Date    Hypertension      Past Surgical History:   Procedure Laterality Date    TOOTH EXTRACTION  02/16/2021 Family History   Problem Relation Age of Onset    Hypertension Family     Cancer Family      Social History     Socioeconomic History    Marital status: /Civil Union     Spouse name: Not on file    Number of children: Not on file    Years of education: Not on file    Highest education level: Not on file   Occupational History    Not on file   Tobacco Use    Smoking status: Current Every Day Smoker    Smokeless tobacco: Never Used   Substance and Sexual Activity    Alcohol use: Yes     Comment: rarely    Drug use: Never    Sexual activity: Not on file   Other Topics Concern    Not on file   Social History Narrative    Not on file     Social Determinants of Health     Financial Resource Strain:     Difficulty of Paying Living Expenses:    Food Insecurity:     Worried About Running Out of Food in the Last Year:     [address] in the Last Year:    Transportation Needs:     Lack of Transportation (Medical):      Lack of Transportation (Non-Medical):    Physical Activity:     Days of Exercise per Week:     Minutes of Exercise per Session:    Stress:     Feeling of Stress :    Social Connections:     Frequency of Communication with Friends and Family:     Frequency of Social Gatherings with Friends and Family:     Attends Jehovah's witness Services:     Active Member of Clubs or Organizations:     Attends Club or Organization Meetings:     Marital Status:    Intimate Partner Violence:     Fear of Current or Ex-Partner:     Emotionally Abused:     Physically Abused:     Sexually Abused:        Current Outpatient Medications:     ALPRAZolam (XANAX) 0 5 mg tablet, Take 0 5 mg by mouth daily at bedtime as needed for anxiety, Disp: , Rfl:     bisoprolol-hydrochlorothiazide (ZIAC) 5-6 25 MG per tablet, Take 1 tablet by mouth daily, Disp: , Rfl:     predniSONE 50 mg tablet, Take 1 tablet (50 mg total) by mouth 2 (two) times a day with meals (Patient not taking: Reported on 9/14/2021), Disp: 30 tablet, Rfl: 0    traMADol (ULTRAM) 50 mg tablet, Take 1 tablet (50 mg total) by mouth every 6 (six) hours as needed for moderate pain, Disp: 12 tablet, Rfl: 0  No current facility-administered medications for this visit  No Known Allergies    Vitals:    09/14/21 0855   BP: 118/74   Pulse: 93   Resp: 16   Temp: 98 8 °F (37 1 °C)   SpO2: 98%       Physical Exam   General: Appears well, appears stated age  Skin: Warm, anicteric  HEENT: Normocephalic, atraumatic; sclera aniceteric, mucous membranes moist; cervical nodes without adenopathy  Cardiopulmonary: RRR, Easy WOB, no BLE edema  Abd: Flat and soft, nontender, no masses appreciated, no hepatosplenomegaly  MSK: Symmetric, no cyanosis, no overt weakness  Lymphatic: No cervical, axillary or inguinal lymphadenopathy  Right supraclavicular node palpated at the medial aspect of the clavicle  Neuro: Affect appropriate, no gross motor abnormalities      Labs: Reviewed in EPIC    Imaging  CT soft tissue neck w contrast    Result Date: 9/13/2021  Narrative: CT NECK WITH CONTRAST INDICATION:   R59 0: Localized enlarged lymph nodes  COMPARISON:  None  TECHNIQUE:  Axial, sagittal, and coronal 2D reformatted images were created from the axial source data and submitted for interpretation  Radiation dose length product (DLP) for this visit:  891 mGy-cm   This examination, like all CT scans performed in the [facility], was performed utilizing techniques to minimize radiation dose exposure, including the use of iterative reconstruction and automated exposure control  IV Contrast:  85 mL of iohexol (OMNIPAQUE) IMAGE QUALITY:  Diagnostic  FINDINGS: VISUALIZED BRAIN PARENCHYMA:  No acute intracranial pathology of the visualized brain parenchyma  VISUALIZED ORBITS AND PARANASAL SINUSES:  Unremarkable orbits  Mild mucosal thickening of the maxillary sinuses   NASAL CAVITY AND NASOPHARYNX:  Normal  SUPRAHYOID NECK:  Normal oral cavity, tongue base, tonsillar fossa and epiglottis  INFRAHYOID NECK:  Aryepiglottic folds and piriform sinuses are normal   Normal glottis and subglottic airway  THYROID GLAND:  Unremarkable  PAROTID AND SUBMANDIBULAR GLANDS:  Normal  LYMPH NODES:  Within the neck there are small supraclavicular nodes demonstrating slightly heterogeneous enhancement  The largest of these on the left measures 2 4 x 1 5 cm and the largest on the right measures 2 2 x 1 8 cm  Small posterior jugular chain nodes are identified within the left neck, level 5  Evaluation of the upper mediastinum demonstrates a large heterogeneously enhancing mass within the anterior superior mediastinum, likely a large elvia mass  Multiple additional mediastinal nodes are seen in the anterior mediastinum, paratracheal, prevascular regions as well as suprahilar regions  VASCULAR STRUCTURES:  Normal enhancement of the cervical vasculature  THORACIC INLET:  See above description of mediastinal masses/adenopathy  The upper lung fields are clear  BONY STRUCTURES: No acute osseous abnormality  Impression: There are multiple supraclavicular nodes identified bilaterally measuring up to 2 4 cm in long axis  Additional small posterior jugular chain/level 5 nodes are present  Large mediastinal mass within the anterior superior mediastinum with multiple surrounding slightly heterogeneously enhancing enlarged lymph nodes extending inferiorly beyond the scope of this exam   Differential considerations would include lymphoma as well as other neoplastic etiologies  Prior outside imaging of the chest is not immediately available for comparison  No imaging of the abdomen or pelvis recently  Tissue sampling recommended  Workstation performed: [id number]       I independently reviewed and interpreted the above laboratory and imaging data        Discussion/Summary:    81-year-old gentleman with a diagnosis of an anterior mediastinal mass and significant cervical lymphadenopathy, most likely consistent with a lymphoma  The patient has been referred for an excisional lymph node biopsy  The right supraclavicular node is easily palpable and I will perform an excisional biopsy of this node  Risks 7 explained to the patient and his father today to include infection, bleeding, transient numbness or tingling  All questions answered today we will proceed with excisional lymph node biopsy

## 2021-09-15 ENCOUNTER — ANESTHESIA (OUTPATIENT)
Dept: PERIOP | Facility: HOSPITAL | Age: 46
End: 2021-09-15
Payer: COMMERCIAL

## 2021-09-15 ENCOUNTER — APPOINTMENT (OUTPATIENT)
Dept: RADIOLOGY | Facility: HOSPITAL | Age: 46
End: 2021-09-15
Payer: COMMERCIAL

## 2021-09-15 ENCOUNTER — HOSPITAL ENCOUNTER (OUTPATIENT)
Facility: HOSPITAL | Age: 46
Setting detail: OUTPATIENT SURGERY
Discharge: HOME | End: 2021-09-15
Attending: STUDENT IN AN ORGANIZED HEALTH CARE EDUCATION/TRAINING PROGRAM | Admitting: STUDENT IN AN ORGANIZED HEALTH CARE EDUCATION/TRAINING PROGRAM
Payer: COMMERCIAL

## 2021-09-15 VITALS
TEMPERATURE: 97.5 F | RESPIRATION RATE: 18 BRPM | WEIGHT: 192 LBS | OXYGEN SATURATION: 95 % | BODY MASS INDEX: 30.13 KG/M2 | SYSTOLIC BLOOD PRESSURE: 130 MMHG | HEART RATE: 97 BPM | HEIGHT: 67 IN | DIASTOLIC BLOOD PRESSURE: 87 MMHG

## 2021-09-15 DIAGNOSIS — R59.0 SUPRACLAVICULAR LYMPHADENOPATHY: ICD-10-CM

## 2021-09-15 PROBLEM — IMO0001 SMOKING: Status: ACTIVE | Noted: 2021-09-15

## 2021-09-15 PROBLEM — F17.200 SMOKING: Status: ACTIVE | Noted: 2021-09-15

## 2021-09-15 LAB — HIV 1+2 AB+HIV1 P24 AG SERPL QL IA: NORMAL

## 2021-09-15 PROCEDURE — 88185 FLOWCYTOMETRY/TC ADD-ON: CPT

## 2021-09-15 PROCEDURE — 88341 IMHCHEM/IMCYTCHM EA ADD ANTB: CPT | Performed by: PATHOLOGY

## 2021-09-15 PROCEDURE — 71045 X-RAY EXAM CHEST 1 VIEW: CPT

## 2021-09-15 PROCEDURE — 88365 INSITU HYBRIDIZATION (FISH): CPT | Performed by: PATHOLOGY

## 2021-09-15 PROCEDURE — 88342 IMHCHEM/IMCYTCHM 1ST ANTB: CPT | Performed by: PATHOLOGY

## 2021-09-15 PROCEDURE — 88368 INSITU HYBRIDIZATION MANUAL: CPT | Performed by: PATHOLOGY

## 2021-09-15 PROCEDURE — 88305 TISSUE EXAM BY PATHOLOGIST: CPT | Performed by: PATHOLOGY

## 2021-09-15 PROCEDURE — 88184 FLOWCYTOMETRY/ TC 1 MARKER: CPT | Performed by: STUDENT IN AN ORGANIZED HEALTH CARE EDUCATION/TRAINING PROGRAM

## 2021-09-15 PROCEDURE — 38510 BIOPSY/REMOVAL LYMPH NODES: CPT | Performed by: STUDENT IN AN ORGANIZED HEALTH CARE EDUCATION/TRAINING PROGRAM

## 2021-09-15 RX ORDER — IBUPROFEN 200 MG
TABLET ORAL EVERY 6 HOURS PRN
COMMUNITY

## 2021-09-15 RX ORDER — CEFAZOLIN SODIUM 2 G/50ML
2000 SOLUTION INTRAVENOUS ONCE
Status: COMPLETED | OUTPATIENT
Start: 2021-09-15 | End: 2021-09-15

## 2021-09-15 RX ORDER — FENTANYL CITRATE 50 UG/ML
INJECTION, SOLUTION INTRAMUSCULAR; INTRAVENOUS AS NEEDED
Status: DISCONTINUED | OUTPATIENT
Start: 2021-09-15 | End: 2021-09-15

## 2021-09-15 RX ORDER — METOCLOPRAMIDE HYDROCHLORIDE 5 MG/ML
10 INJECTION INTRAMUSCULAR; INTRAVENOUS ONCE AS NEEDED
Status: DISCONTINUED | OUTPATIENT
Start: 2021-09-15 | End: 2021-09-15 | Stop reason: HOSPADM

## 2021-09-15 RX ORDER — SODIUM CHLORIDE, SODIUM LACTATE, POTASSIUM CHLORIDE, CALCIUM CHLORIDE 600; 310; 30; 20 MG/100ML; MG/100ML; MG/100ML; MG/100ML
125 INJECTION, SOLUTION INTRAVENOUS CONTINUOUS
Status: DISCONTINUED | OUTPATIENT
Start: 2021-09-15 | End: 2021-09-15 | Stop reason: HOSPADM

## 2021-09-15 RX ORDER — LIDOCAINE HYDROCHLORIDE 10 MG/ML
0.5 INJECTION, SOLUTION EPIDURAL; INFILTRATION; INTRACAUDAL; PERINEURAL ONCE AS NEEDED
Status: DISCONTINUED | OUTPATIENT
Start: 2021-09-15 | End: 2021-09-15 | Stop reason: HOSPADM

## 2021-09-15 RX ORDER — FENTANYL CITRATE/PF 50 MCG/ML
25 SYRINGE (ML) INJECTION
Status: DISCONTINUED | OUTPATIENT
Start: 2021-09-15 | End: 2021-09-15 | Stop reason: HOSPADM

## 2021-09-15 RX ORDER — ONDANSETRON 2 MG/ML
INJECTION INTRAMUSCULAR; INTRAVENOUS AS NEEDED
Status: DISCONTINUED | OUTPATIENT
Start: 2021-09-15 | End: 2021-09-15

## 2021-09-15 RX ORDER — PROPOFOL 10 MG/ML
INJECTION, EMULSION INTRAVENOUS AS NEEDED
Status: DISCONTINUED | OUTPATIENT
Start: 2021-09-15 | End: 2021-09-15

## 2021-09-15 RX ORDER — MAGNESIUM HYDROXIDE 1200 MG/15ML
LIQUID ORAL AS NEEDED
Status: DISCONTINUED | OUTPATIENT
Start: 2021-09-15 | End: 2021-09-15 | Stop reason: HOSPADM

## 2021-09-15 RX ORDER — GLYCOPYRROLATE 0.2 MG/ML
INJECTION INTRAMUSCULAR; INTRAVENOUS AS NEEDED
Status: DISCONTINUED | OUTPATIENT
Start: 2021-09-15 | End: 2021-09-15

## 2021-09-15 RX ORDER — MIDAZOLAM HYDROCHLORIDE 2 MG/2ML
INJECTION, SOLUTION INTRAMUSCULAR; INTRAVENOUS AS NEEDED
Status: DISCONTINUED | OUTPATIENT
Start: 2021-09-15 | End: 2021-09-15

## 2021-09-15 RX ORDER — SODIUM CHLORIDE, SODIUM LACTATE, POTASSIUM CHLORIDE, CALCIUM CHLORIDE 600; 310; 30; 20 MG/100ML; MG/100ML; MG/100ML; MG/100ML
INJECTION, SOLUTION INTRAVENOUS CONTINUOUS PRN
Status: DISCONTINUED | OUTPATIENT
Start: 2021-09-15 | End: 2021-09-15

## 2021-09-15 RX ORDER — KETOROLAC TROMETHAMINE 30 MG/ML
INJECTION, SOLUTION INTRAMUSCULAR; INTRAVENOUS AS NEEDED
Status: DISCONTINUED | OUTPATIENT
Start: 2021-09-15 | End: 2021-09-15

## 2021-09-15 RX ORDER — MEPERIDINE HYDROCHLORIDE 25 MG/ML
25 INJECTION INTRAMUSCULAR; INTRAVENOUS; SUBCUTANEOUS ONCE AS NEEDED
Status: DISCONTINUED | OUTPATIENT
Start: 2021-09-15 | End: 2021-09-15 | Stop reason: HOSPADM

## 2021-09-15 RX ORDER — LIDOCAINE HYDROCHLORIDE 10 MG/ML
INJECTION, SOLUTION EPIDURAL; INFILTRATION; INTRACAUDAL; PERINEURAL AS NEEDED
Status: DISCONTINUED | OUTPATIENT
Start: 2021-09-15 | End: 2021-09-15

## 2021-09-15 RX ADMIN — MIDAZOLAM HYDROCHLORIDE 2 MG: 1 INJECTION, SOLUTION INTRAMUSCULAR; INTRAVENOUS at 09:29

## 2021-09-15 RX ADMIN — CEFAZOLIN SODIUM 2000 MG: 2 SOLUTION INTRAVENOUS at 09:29

## 2021-09-15 RX ADMIN — FENTANYL CITRATE 25 MCG: 50 INJECTION, SOLUTION INTRAMUSCULAR; INTRAVENOUS at 10:12

## 2021-09-15 RX ADMIN — FENTANYL CITRATE 50 MCG: 50 INJECTION, SOLUTION INTRAMUSCULAR; INTRAVENOUS at 09:40

## 2021-09-15 RX ADMIN — SODIUM CHLORIDE, SODIUM LACTATE, POTASSIUM CHLORIDE, AND CALCIUM CHLORIDE: .6; .31; .03; .02 INJECTION, SOLUTION INTRAVENOUS at 09:20

## 2021-09-15 RX ADMIN — GLYCOPYRROLATE 0.2 MG: 0.2 INJECTION, SOLUTION INTRAMUSCULAR; INTRAVENOUS at 09:46

## 2021-09-15 RX ADMIN — ONDANSETRON 4 MG: 2 INJECTION INTRAMUSCULAR; INTRAVENOUS at 09:40

## 2021-09-15 RX ADMIN — FENTANYL CITRATE 50 MCG: 50 INJECTION, SOLUTION INTRAMUSCULAR; INTRAVENOUS at 09:47

## 2021-09-15 RX ADMIN — LIDOCAINE HYDROCHLORIDE 50 MG: 10 INJECTION, SOLUTION EPIDURAL; INFILTRATION; INTRACAUDAL at 09:35

## 2021-09-15 RX ADMIN — PROPOFOL 200 MG: 10 INJECTION, EMULSION INTRAVENOUS at 09:35

## 2021-09-15 RX ADMIN — KETOROLAC TROMETHAMINE 15 MG: 30 INJECTION, SOLUTION INTRAMUSCULAR at 10:14

## 2021-09-15 NOTE — DISCHARGE INSTRUCTIONS
There is skin glue and a small band aid strip over your incision  Beginning tomorrow, you can shower with gentle soap and water as usual and the band aid strip will fall off over time  Do not soak your incision (eg in a bath) for 10 days  Use over the counter tylenol and advil as needed for pain - these can be utilized at the same time  For example, if needed, I recommend 1G tylenol @ 0900, 600 mg advil @ 1200, 1G tylenol @ 1500, 600 mg advil @ 1800, etc  If you have pain that exceeds this, take prescribed medication as needed  Expect tenderness after surgery  If you have any significant fever (>101), spreading redness, drainage of pus-like fluid, give my office a call   If you have any other issues or questions, call anytime  [phone]

## 2021-09-15 NOTE — OP NOTE
OPERATIVE REPORT  PATIENT NAME: Kristina Funez    :  1975  MRN: [id number]  Pt Location: AN OR ROOM 01    SURGERY DATE: 9/15/2021    Surgeon(s) and Role:     * Bam Mora MD - Primary     * Curtis Fraser MD - Assisting    Preop Diagnosis:  Supraclavicular lymphadenopathy [R59 0]    Post-Op Diagnosis Codes:     * Supraclavicular lymphadenopathy [R59 0]    Procedure(s) (LRB):  EXCISIONAL BIOPSY OF RIGHT NECK LYMPH NODE WITH LYMPHOMA PROTOCOL (Right)    Specimen(s):  ID Type Source Tests Collected by Time Destination   1 : Right Neck Lymphnode Tissue Lymph Node TISSUE EXAM, LEUKEMIA/LYMPHOMA FLOW CYTOMETRY Bam Mora MD 9/15/2021 1012        Estimated Blood Loss:   Minimal    Drains:  * No LDAs found *    Anesthesia Type:   General    Operative Indications:  Supraclavicular lymphadenopathy [R59 0]    Mediastinal and cervical lymphadenopathy suspicious for lymphoma    Operative Findings:  Enlarged and abnormal appearing supraclavicular node    Complications:   None    Procedure and Technique:  Patient was identified in the operating suite  LMA anesthesia was induced  The right neck and chest were clipped  The right neck and chest were then prepped and draped in the usual sterile fashion  Time-out was performed  A 3 cm incision was made in this the supraclavicular skin in a fold of the neck after infiltration of local anesthesia  The deep dermal tissues and platysma were divided with electrocautery  The fibers of the sternocleidomastoid were retracted medially and laterally to reveal the enlarged node just deep to the muscle  The node was circumferentially dissected and its base was tied off with 3-0 silk  The node was then passed off the field as a specimen  The neck was closed in layers, including the platysma and deep dermal tissues followed by the skin  Finally, skin glue was placed and when it was dry a Steri-Strip was placed  The patient tolerated the procedure well       I was present for the entire procedure    Patient Disposition:  PACU     SIGNATURE: Pierre Herr MD  DATE: September 15, 2021  TIME: 10:28 AM

## 2021-09-15 NOTE — ANESTHESIA POSTPROCEDURE EVALUATION
Post-Op Assessment Note    CV Status:  Stable  Pain Score: 0    Pain management: adequate     Mental Status:  Alert and awake   Hydration Status:  Euvolemic   PONV Controlled:  Controlled   Airway Patency:  Patent      Post Op Vitals Reviewed: Yes      Staff: CRNA, Anesthesiologist         No complications documented      BP   150/67   Temp  98   Pulse  100   Resp   20   SpO2   100

## 2021-09-15 NOTE — INTERVAL H&P NOTE
H&P reviewed  After examining the patient I find no changes in the patients condition since the H&P had been written      Vitals:    09/15/21 0651   BP: 126/74   Pulse: 89   Resp: 18   Temp: (!) 96 9 °F (36 1 °C)   SpO2: 97%

## 2021-09-16 ENCOUNTER — TELEPHONE (OUTPATIENT)
Dept: HEMATOLOGY ONCOLOGY | Facility: CLINIC | Age: 46
End: 2021-09-16

## 2021-09-16 NOTE — TELEPHONE ENCOUNTER
Appointment Confirmation     Appointment with  Dr Cuevas Counter   Appointment date & time  10/01/2021   Location [facility]   Patient verbilized Understanding  yes

## 2021-09-20 ENCOUNTER — TELEPHONE (OUTPATIENT)
Dept: HEMATOLOGY ONCOLOGY | Facility: CLINIC | Age: 46
End: 2021-09-20

## 2021-09-20 NOTE — TELEPHONE ENCOUNTER
Left voicemail for pt letting him know PET rescheduled due to insurance denial (Dr Quinonez Markus waiting on flow cytometry)  Provided PET appt info and rescheduled follow up appt info on voicemail  Encouraged call back with any questions

## 2021-09-20 NOTE — TELEPHONE ENCOUNTER
Biopsy results show DLBCL per Dr Silverio Horowitz pt needs to be seen to review treatment  Message sent to request pt be added to schedule tomorrow  Pt notified

## 2021-09-21 ENCOUNTER — OFFICE VISIT (OUTPATIENT)
Dept: HEMATOLOGY ONCOLOGY | Facility: CLINIC | Age: 46
End: 2021-09-21
Payer: COMMERCIAL

## 2021-09-21 VITALS
WEIGHT: 188.8 LBS | DIASTOLIC BLOOD PRESSURE: 110 MMHG | OXYGEN SATURATION: 97 % | SYSTOLIC BLOOD PRESSURE: 160 MMHG | HEIGHT: 67 IN | RESPIRATION RATE: 16 BRPM | TEMPERATURE: 96.1 F | BODY MASS INDEX: 29.63 KG/M2 | HEART RATE: 100 BPM

## 2021-09-21 DIAGNOSIS — D70.1 CHEMOTHERAPY INDUCED NEUTROPENIA (HCC): ICD-10-CM

## 2021-09-21 DIAGNOSIS — C83.32 DIFFUSE LARGE B-CELL LYMPHOMA OF INTRATHORACIC LYMPH NODES (HCC): Primary | ICD-10-CM

## 2021-09-21 DIAGNOSIS — T45.1X5A CHEMOTHERAPY INDUCED NEUTROPENIA (HCC): ICD-10-CM

## 2021-09-21 LAB — SCAN RESULT: NORMAL

## 2021-09-21 PROCEDURE — 99215 OFFICE O/P EST HI 40 MIN: CPT | Performed by: INTERNAL MEDICINE

## 2021-09-21 NOTE — TELEPHONE ENCOUNTER
Josef from pet ct called to have the order for the pet ct signed off in [facility] for appt tomorrow by dr Chin Colon

## 2021-09-21 NOTE — PROGRESS NOTES
Hematology Outpatient Follow - Up Note  Reggie Larios 42 y o  male MRN: @ Encounter: [id number]        Date:  9/21/2021        Assessment/ Plan:    47-year-old  male with past medical history of hypertension, he smokes half pack of cigarettes daily for many years, presented with constitutional symptoms such as low-grade fever, night sweats, weight loss over the past few weeks, chest x-ray showed wide mediastinum, CT scan at [facility] on 09/2021 showed 11 x 6 1 x 6 7 cm anterior mediastinal mass, multiple enlarged mediastinal lymph nodes are present as well, bilateral supraclavicular lymphadenopathy as well, no evidence of axillary lymphadenopathy      Differential diagnosis includes lymphoproliferative disorder, sarcoidosis, germ cell tumor, thymoma      excisional biopsy of the right supraclavicular area showed diffuse large B-cell lymphoma, co expressing c-myc, BCL2, nongerminal subtype, Ki-67> 75%, highly positive for CD 20 scattered positivity for CD 30     Positive for EB expressing protein consistent with diffuse large B-cell lymphoma expressing EBV of not otherwise specified type     I had long discussion with the patient and his family I believe treatment with [address] is mildly highly significant than R-CHOP    Await for PET scan, will order echo Doppler of the heart     Side effects to chemotherapy that are but not limited to alopecia, nausea, vomiting, congestive heart failure, neuropathy, pancytopenia, sepsis was told he agree to proceed     Of we need to start treatment as soon as possible with eye with R-CHOP and later on determine the need for [address] depends on the final diagnosis             Labs and imaging studies are reviewed by ordering provider once results are available  If there are findings that need immediate attention, you will be contacted when results available     Discussing results and the implication on your healthcare is best discussed in person at your follow-up visit  HPI:     68-year-old  male with history of hypertension, anxiety, had been feeling with intermittent pleurisy for the past few months, later on he had shortness of breath when he goes up stairs, in the past 1-2 weeks CEA had low-grade fever at night, night sweats, he lost about 5-10 lb     Chest x-ray on 09/10/2021  Widening of the mediastinum     CT scan of the chest showed large anterior mediastinal mass measuring 11 4 x 6 1 x 6 7 cm with multiple enlarged mediastinal lymph nodes for example a prevascular lymph node measuring 2 4 x 1 6 cm, right paratracheal lymph node measuring 1 4 x 2 8 cm, bilateral supraclavicular lymphadenopathy, subcentimeter right hilar lymphadenopathy no axillary lymph node enlargement      Denies any headache blurred vision diplopia angina abdominal pain dysuria hematuria melena hematochezia skin rash pruritus      He smokes half pack of cigarettes daily for many years, drinks alcohol occasionally    Status post excision biopsy of the right supraclavicular area    Final Diagnosis   A  Lymph node, right neck, excision:  -  EBV-positive diffuse large B-cell lymphoma, not otherwise specified (see note)      Electronically signed by Blessing Santiago MD on 9/20/2021 at  3:04 PM   Note    The sample shows lymph node parenchyma which is almost entirely replaced by a diffuse population of large highly atypical cells  The atypical cells show significant pleomorphism, prominent nucleoli, mitotic activity, and zonal necrosis  Immunohistochemical stains performed with appropriate controls show background CD3/CD5 positive T-cells with abundant large CD 20 positive B-cells which coexpress CD30, MUM1, partial BCL6, BCL2, c-Myc, and SKYLA HANNAH with high Ki-67 proliferative activity (>75%); the B-cells are negative for CD10, CD5, and cyclin D1    A portion of the sample was submitted for flow cytometric analysis ([id number]; VERÓNICA London , Ph D ) which shows a small population of large B-cells with lambda expression which are CD5 and CD10 negative      Overall, the combination of findings is diagnostic of large B-cell lymphoma with high-grade features (coexpression of c-Myc and BCL2) and non-germinal center phenotype and SKYLA expression consistent with EBV positive diffuse large B-cell lymphoma, not otherwise specified  Additional genetic analysis for pertinent mutations is pending and will be reported as an addendum  await for PET scan results  Interval History:        Previous Treatment:         Test Results:    Imaging: XR chest portable    Result Date: 9/15/2021  Narrative: CHEST INDICATION:   r/o pneumothorax  COMPARISON:  Correlated with CT the neck performed September 13, 2021 EXAM PERFORMED/VIEWS:  XR CHEST PORTABLE FINDINGS: Normal heart size  Widening of the mediastinum, broad differential however given known cervical lymphadenopathy mediastinal adenopathy or mass is highest on the differential, CT chest can further evaluate  Low lung volumes with bibasilar atelectasis  No pneumothorax  Eventration of the right hemidiaphragm  Right neck soft tissue gas, findings consistent with known clinical history of recent cervical lymph node excision  No acute osseous abnormality  Impression: 1  No pneumothorax  2   Low lung volumes with bibasilar atelectasis  3   Widening of the mediastinum, broad differential however given known cervical lymphadenopathy mediastinal adenopathy or mass is highest on the differential, CT chest can further evaluate  4   Eventration of the right hemidiaphragm  Workstation performed: [id number]     CT soft tissue neck w contrast    Result Date: 9/13/2021  Narrative: CT NECK WITH CONTRAST INDICATION:   R59 0: Localized enlarged lymph nodes  COMPARISON:  None  TECHNIQUE:  Axial, sagittal, and coronal 2D reformatted images were created from the axial source data and submitted for interpretation   Radiation dose length product (DLP) for this visit:  891 mGy-cm   This examination, like all CT scans performed in the [facility], was performed utilizing techniques to minimize radiation dose exposure, including the use of iterative reconstruction and automated exposure control  IV Contrast:  85 mL of iohexol (OMNIPAQUE) IMAGE QUALITY:  Diagnostic  FINDINGS: VISUALIZED BRAIN PARENCHYMA:  No acute intracranial pathology of the visualized brain parenchyma  VISUALIZED ORBITS AND PARANASAL SINUSES:  Unremarkable orbits  Mild mucosal thickening of the maxillary sinuses  NASAL CAVITY AND NASOPHARYNX:  Normal  SUPRAHYOID NECK:  Normal oral cavity, tongue base, tonsillar fossa and epiglottis  INFRAHYOID NECK:  Aryepiglottic folds and piriform sinuses are normal   Normal glottis and subglottic airway  THYROID GLAND:  Unremarkable  PAROTID AND SUBMANDIBULAR GLANDS:  Normal  LYMPH NODES:  Within the neck there are small supraclavicular nodes demonstrating slightly heterogeneous enhancement  The largest of these on the left measures 2 4 x 1 5 cm and the largest on the right measures 2 2 x 1 8 cm  Small posterior jugular chain nodes are identified within the left neck, level 5  Evaluation of the upper mediastinum demonstrates a large heterogeneously enhancing mass within the anterior superior mediastinum, likely a large elvia mass  Multiple additional mediastinal nodes are seen in the anterior mediastinum, paratracheal, prevascular regions as well as suprahilar regions  VASCULAR STRUCTURES:  Normal enhancement of the cervical vasculature  THORACIC INLET:  See above description of mediastinal masses/adenopathy  The upper lung fields are clear  BONY STRUCTURES: No acute osseous abnormality  Impression: There are multiple supraclavicular nodes identified bilaterally measuring up to 2 4 cm in long axis  Additional small posterior jugular chain/level 5 nodes are present   Large mediastinal mass within the anterior superior mediastinum with multiple surrounding slightly heterogeneously enhancing enlarged lymph nodes extending inferiorly beyond the scope of this exam   Differential considerations would include lymphoma as well as other neoplastic etiologies  Prior outside imaging of the chest is not immediately available for comparison  No imaging of the abdomen or pelvis recently  Tissue sampling recommended  Workstation performed: [id number]       Labs:   Lab Results   Component Value Date    WBC 9 33 09/13/2021    HGB 15 9 09/13/2021    HCT 48 0 09/13/2021    MCV 86 09/13/2021     09/13/2021     Lab Results   Component Value Date    K 4 3 09/13/2021     09/13/2021    CO2 30 09/13/2021    BUN 12 09/13/2021    CREATININE 1 05 09/13/2021    GLUF 105 (H) 03/10/2021    CALCIUM 9 6 09/13/2021    AST 14 09/13/2021    ALT 40 09/13/2021    ALKPHOS 143 (H) 09/13/2021    EGFR 86 09/13/2021       No results found for: IRON, TIBC, FERRITIN    No results found for: [id number]      ROS: Review of Systems   Constitutional: Negative  Negative for appetite change, chills, diaphoresis, fatigue, fever and unexpected weight change  HENT:   Negative for hearing loss, lump/mass, mouth sores, nosebleeds, sore throat, trouble swallowing and voice change  Eyes: Negative  Negative for eye problems and icterus  Respiratory: Negative  Negative for chest tightness, cough, hemoptysis and shortness of breath  Cardiovascular: Negative for chest pain and leg swelling  Gastrointestinal: Negative for abdominal distention, abdominal pain, blood in stool, constipation, diarrhea and nausea  Endocrine: Negative  Genitourinary: Negative for dysuria, frequency, hematuria and pelvic pain  Musculoskeletal: Negative  Negative for arthralgias, back pain, flank pain, gait problem, myalgias and neck stiffness  Skin: Negative for itching and rash     Neurological: Negative for dizziness, gait problem, headaches, light-headedness, numbness and speech difficulty  Hematological: Negative for adenopathy  Does not bruise/bleed easily  Psychiatric/Behavioral: Negative for confusion, decreased concentration, depression and sleep disturbance  The patient is not nervous/anxious  Current Medications: Reviewed  Allergies: Reviewed  PMH/FH/SH:  Reviewed      Physical Exam:    Body surface area is 1 97 meters squared  Wt Readings from Last 3 Encounters:   09/21/21 85 6 kg (188 lb 12 8 oz)   09/15/21 87 1 kg (192 lb)   09/14/21 87 1 kg (192 lb)        Temp Readings from Last 3 Encounters:   09/21/21 (!) 96 1 °F (35 6 °C) (Tympanic)   09/15/21 97 5 °F (36 4 °C)   09/14/21 98 8 °F (37 1 °C)        BP Readings from Last 3 Encounters:   09/21/21 (!) 160/110   09/15/21 130/87   09/14/21 118/74         Pulse Readings from Last 3 Encounters:   09/21/21 100   09/15/21 97   09/14/21 93        Physical Exam  Vitals reviewed  Constitutional:       General: He is not in acute distress  Appearance: He is well-developed  He is not diaphoretic  HENT:      Head: Normocephalic and atraumatic  Eyes:      Conjunctiva/sclera: Conjunctivae normal    Neck:      Trachea: No tracheal deviation  Cardiovascular:      Rate and Rhythm: Normal rate and regular rhythm  Heart sounds: No murmur heard  No friction rub  No gallop  Pulmonary:      Effort: Pulmonary effort is normal  No respiratory distress  Breath sounds: Normal breath sounds  No wheezing or rales  Chest:      Chest wall: No tenderness  Abdominal:      General: There is no distension  Palpations: Abdomen is soft  Tenderness: There is no abdominal tenderness  Musculoskeletal:      Cervical back: Normal range of motion and neck supple  Right lower leg: No edema  Left lower leg: No edema  Lymphadenopathy:      Cervical: No cervical adenopathy  Skin:     General: Skin is warm and dry  Coloration: Skin is not pale  Findings: No erythema     Neurological:      Mental Status: He is alert and oriented to person, place, and time  Psychiatric:         Behavior: Behavior normal          Thought Content: Thought content normal          Judgment: Judgment normal          ECO    Goals and Barriers:  Current Goal: Minimize effects of disease  Barriers: None  Patient's Capacity to Self Care:  Patient is able to self care      Code Status: [unfilled]

## 2021-09-22 ENCOUNTER — HOSPITAL ENCOUNTER (OUTPATIENT)
Dept: RADIOLOGY | Age: 46
Discharge: HOME | End: 2021-09-22
Attending: INTERNAL MEDICINE
Payer: COMMERCIAL

## 2021-09-22 DIAGNOSIS — T45.1X5A CHEMOTHERAPY INDUCED NAUSEA AND VOMITING: Primary | ICD-10-CM

## 2021-09-22 DIAGNOSIS — R11.2 CHEMOTHERAPY INDUCED NAUSEA AND VOMITING: Primary | ICD-10-CM

## 2021-09-22 DIAGNOSIS — R59.0 MEDIASTINAL LYMPHADENOPATHY: ICD-10-CM

## 2021-09-22 DIAGNOSIS — C83.30 DIFFUSE LARGE B-CELL LYMPHOMA, UNSPECIFIED BODY REGION (HCC): ICD-10-CM

## 2021-09-22 LAB — GLUCOSE SERPL-MCNC: 139 MG/DL (ref 65–140)

## 2021-09-22 PROCEDURE — 78815 PET IMAGE W/CT SKULL-THIGH: CPT

## 2021-09-22 PROCEDURE — 82948 REAGENT STRIP/BLOOD GLUCOSE: CPT

## 2021-09-22 PROCEDURE — A9552 F18 FDG: HCPCS

## 2021-09-22 RX ORDER — ONDANSETRON 4 MG/1
4 TABLET, FILM COATED ORAL EVERY 8 HOURS PRN
Qty: 30 TABLET | Refills: 1 | Status: SHIPPED | OUTPATIENT
Start: 2021-09-22 | End: 2022-05-23

## 2021-09-23 ENCOUNTER — HOSPITAL ENCOUNTER (OUTPATIENT)
Dept: NON INVASIVE DIAGNOSTICS | Facility: HOSPITAL | Age: 46
Discharge: HOME | End: 2021-09-23
Attending: INTERNAL MEDICINE
Payer: COMMERCIAL

## 2021-09-23 DIAGNOSIS — C83.32 DIFFUSE LARGE B-CELL LYMPHOMA OF INTRATHORACIC LYMPH NODES (HCC): ICD-10-CM

## 2021-09-23 DIAGNOSIS — T45.1X5A CHEMOTHERAPY INDUCED NEUTROPENIA (HCC): ICD-10-CM

## 2021-09-23 DIAGNOSIS — D70.1 CHEMOTHERAPY INDUCED NEUTROPENIA (HCC): ICD-10-CM

## 2021-09-23 PROCEDURE — 93306 TTE W/DOPPLER COMPLETE: CPT

## 2021-09-23 PROCEDURE — 93356 MYOCRD STRAIN IMG SPCKL TRCK: CPT

## 2021-09-23 PROCEDURE — 93306 TTE W/DOPPLER COMPLETE: CPT | Performed by: INTERNAL MEDICINE

## 2021-09-24 ENCOUNTER — APPOINTMENT (OUTPATIENT)
Dept: LAB | Age: 46
End: 2021-09-24
Payer: COMMERCIAL

## 2021-09-24 DIAGNOSIS — C83.32 DIFFUSE LARGE B-CELL LYMPHOMA OF INTRATHORACIC LYMPH NODES (HCC): ICD-10-CM

## 2021-09-24 LAB
ALBUMIN SERPL BCP-MCNC: 3.6 G/DL (ref 3.5–5)
ALP SERPL-CCNC: 120 U/L (ref 46–116)
ALT SERPL W P-5'-P-CCNC: 42 U/L (ref 12–78)
ANION GAP SERPL CALCULATED.3IONS-SCNC: 3 MMOL/L (ref 4–13)
AST SERPL W P-5'-P-CCNC: 15 U/L (ref 5–45)
BASOPHILS # BLD AUTO: 0.09 THOUSANDS/ΜL (ref 0–0.1)
BASOPHILS NFR BLD AUTO: 1 % (ref 0–1)
BILIRUB SERPL-MCNC: 0.81 MG/DL (ref 0.2–1)
BUN SERPL-MCNC: 18 MG/DL (ref 5–25)
CALCIUM SERPL-MCNC: 9.8 MG/DL (ref 8.3–10.1)
CHLORIDE SERPL-SCNC: 100 MMOL/L (ref 100–108)
CO2 SERPL-SCNC: 30 MMOL/L (ref 21–32)
CREAT SERPL-MCNC: 0.95 MG/DL (ref 0.6–1.3)
EOSINOPHIL # BLD AUTO: 0.52 THOUSAND/ΜL (ref 0–0.61)
EOSINOPHIL NFR BLD AUTO: 4 % (ref 0–6)
ERYTHROCYTE [DISTWIDTH] IN BLOOD BY AUTOMATED COUNT: 12.3 % (ref 11.6–15.1)
GFR SERPL CREATININE-BSD FRML MDRD: 97 ML/MIN/1.73SQ M
GLUCOSE SERPL-MCNC: 99 MG/DL (ref 65–140)
HCT VFR BLD AUTO: 51.7 % (ref 36.5–49.3)
HGB BLD-MCNC: 17.1 G/DL (ref 12–17)
IMM GRANULOCYTES # BLD AUTO: 0.26 THOUSAND/UL (ref 0–0.2)
IMM GRANULOCYTES NFR BLD AUTO: 2 % (ref 0–2)
LYMPHOCYTES # BLD AUTO: 0.55 THOUSANDS/ΜL (ref 0.6–4.47)
LYMPHOCYTES NFR BLD AUTO: 5 % (ref 14–44)
MCH RBC QN AUTO: 28.5 PG (ref 26.8–34.3)
MCHC RBC AUTO-ENTMCNC: 33.1 G/DL (ref 31.4–37.4)
MCV RBC AUTO: 86 FL (ref 82–98)
MONOCYTES # BLD AUTO: 1.34 THOUSAND/ΜL (ref 0.17–1.22)
MONOCYTES NFR BLD AUTO: 11 % (ref 4–12)
NEUTROPHILS # BLD AUTO: 9.18 THOUSANDS/ΜL (ref 1.85–7.62)
NEUTS SEG NFR BLD AUTO: 77 % (ref 43–75)
NRBC BLD AUTO-RTO: 0 /100 WBCS
PLATELET # BLD AUTO: 315 THOUSANDS/UL (ref 149–390)
PMV BLD AUTO: 9.2 FL (ref 8.9–12.7)
POTASSIUM SERPL-SCNC: 4.5 MMOL/L (ref 3.5–5.3)
PROT SERPL-MCNC: 7.6 G/DL (ref 6.4–8.2)
RBC # BLD AUTO: 6 MILLION/UL (ref 3.88–5.62)
SODIUM SERPL-SCNC: 133 MMOL/L (ref 136–145)
WBC # BLD AUTO: 11.94 THOUSAND/UL (ref 4.31–10.16)

## 2021-09-24 PROCEDURE — 85025 COMPLETE CBC W/AUTO DIFF WBC: CPT

## 2021-09-24 PROCEDURE — 36415 COLL VENOUS BLD VENIPUNCTURE: CPT

## 2021-09-24 PROCEDURE — 80053 COMPREHEN METABOLIC PANEL: CPT

## 2021-09-27 ENCOUNTER — PREP FOR PROCEDURE (OUTPATIENT)
Dept: HEMATOLOGY ONCOLOGY | Facility: CLINIC | Age: 46
End: 2021-09-27

## 2021-09-27 ENCOUNTER — HOSPITAL ENCOUNTER (OUTPATIENT)
Dept: INFUSION CENTER | Facility: HOSPITAL | Age: 46
Discharge: HOME | End: 2021-09-27
Attending: INTERNAL MEDICINE
Payer: COMMERCIAL

## 2021-09-27 VITALS
OXYGEN SATURATION: 96 % | WEIGHT: 190.92 LBS | HEIGHT: 67 IN | TEMPERATURE: 97.2 F | HEART RATE: 80 BPM | DIASTOLIC BLOOD PRESSURE: 69 MMHG | BODY MASS INDEX: 29.97 KG/M2 | SYSTOLIC BLOOD PRESSURE: 143 MMHG | RESPIRATION RATE: 18 BRPM

## 2021-09-27 DIAGNOSIS — C83.32 DIFFUSE LARGE B-CELL LYMPHOMA OF INTRATHORACIC LYMPH NODES (HCC): Primary | ICD-10-CM

## 2021-09-27 DIAGNOSIS — R59.0 MEDIASTINAL LYMPHADENOPATHY: ICD-10-CM

## 2021-09-27 PROCEDURE — 96417 CHEMO IV INFUS EACH ADDL SEQ: CPT

## 2021-09-27 PROCEDURE — 96376 TX/PRO/DX INJ SAME DRUG ADON: CPT

## 2021-09-27 PROCEDURE — 96367 TX/PROPH/DG ADDL SEQ IV INF: CPT

## 2021-09-27 PROCEDURE — 96415 CHEMO IV INFUSION ADDL HR: CPT

## 2021-09-27 PROCEDURE — 96411 CHEMO IV PUSH ADDL DRUG: CPT

## 2021-09-27 PROCEDURE — 96413 CHEMO IV INFUSION 1 HR: CPT

## 2021-09-27 PROCEDURE — 96375 TX/PRO/DX INJ NEW DRUG ADDON: CPT

## 2021-09-27 RX ORDER — DOXORUBICIN HYDROCHLORIDE 2 MG/ML
50 INJECTION, SOLUTION INTRAVENOUS ONCE
Status: COMPLETED | OUTPATIENT
Start: 2021-09-27 | End: 2021-09-27

## 2021-09-27 RX ORDER — DIPHENHYDRAMINE HYDROCHLORIDE 50 MG/ML
25 INJECTION INTRAMUSCULAR; INTRAVENOUS
Status: ACTIVE | OUTPATIENT
Start: 2021-09-27 | End: 2021-09-27

## 2021-09-27 RX ORDER — SODIUM CHLORIDE 9 MG/ML
20 INJECTION, SOLUTION INTRAVENOUS ONCE
Status: COMPLETED | OUTPATIENT
Start: 2021-09-27 | End: 2021-09-27

## 2021-09-27 RX ORDER — PREDNISONE 50 MG/1
50 TABLET ORAL 2 TIMES DAILY WITH MEALS
Qty: 30 TABLET | Refills: 0 | Status: SHIPPED | OUTPATIENT
Start: 2021-09-27 | End: 2021-11-26 | Stop reason: SDUPTHER

## 2021-09-27 RX ORDER — FAMOTIDINE 20 MG/50ML
20 INJECTION, SOLUTION INTRAVENOUS ONCE
Status: COMPLETED | OUTPATIENT
Start: 2021-09-27 | End: 2021-09-27

## 2021-09-27 RX ORDER — ACETAMINOPHEN 325 MG/1
650 TABLET ORAL ONCE
Status: COMPLETED | OUTPATIENT
Start: 2021-09-27 | End: 2021-09-27

## 2021-09-27 RX ADMIN — FAMOTIDINE 20 MG: 20 INJECTION, SOLUTION INTRAVENOUS at 11:13

## 2021-09-27 RX ADMIN — ACETAMINOPHEN 650 MG: 325 TABLET ORAL at 08:51

## 2021-09-27 RX ADMIN — DOXORUBICIN HYDROCHLORIDE 99 MG: 2 INJECTION, SOLUTION INTRAVENOUS at 16:05

## 2021-09-27 RX ADMIN — DIPHENHYDRAMINE HYDROCHLORIDE 25 MG: 50 INJECTION, SOLUTION INTRAMUSCULAR; INTRAVENOUS at 08:51

## 2021-09-27 RX ADMIN — DEXAMETHASONE SODIUM PHOSPHATE: 10 INJECTION, SOLUTION INTRAMUSCULAR; INTRAVENOUS at 14:25

## 2021-09-27 RX ADMIN — HYDROCORTISONE SODIUM SUCCINATE 50 MG: 100 INJECTION, POWDER, FOR SOLUTION INTRAMUSCULAR; INTRAVENOUS at 11:10

## 2021-09-27 RX ADMIN — SODIUM CHLORIDE 20 ML/HR: 0.9 INJECTION, SOLUTION INTRAVENOUS at 08:46

## 2021-09-27 RX ADMIN — FOSAPREPITANT 150 MG: 150 INJECTION, POWDER, LYOPHILIZED, FOR SOLUTION INTRAVENOUS at 14:48

## 2021-09-27 RX ADMIN — CYCLOPHOSPHAMIDE 1478 MG: 1 INJECTION, POWDER, FOR SOLUTION INTRAVENOUS; ORAL at 15:29

## 2021-09-27 RX ADMIN — SODIUM CHLORIDE 500 ML: 0.9 INJECTION, SOLUTION INTRAVENOUS at 11:08

## 2021-09-27 RX ADMIN — DIPHENHYDRAMINE HYDROCHLORIDE 25 MG: 50 INJECTION, SOLUTION INTRAMUSCULAR; INTRAVENOUS at 11:09

## 2021-09-27 RX ADMIN — VINCRISTINE SULFATE 2 MG: 1 INJECTION, SOLUTION INTRAVENOUS at 16:22

## 2021-09-27 RX ADMIN — RITUXIMAB 738.8 MG: 10 INJECTION, SOLUTION INTRAVENOUS at 10:03

## 2021-09-28 ENCOUNTER — ANESTHESIA EVENT (OUTPATIENT)
Dept: PERIOP | Facility: AMBULARY SURGERY CENTER | Age: 46
End: 2021-09-28
Payer: COMMERCIAL

## 2021-09-28 ENCOUNTER — OFFICE VISIT (OUTPATIENT)
Dept: SURGICAL ONCOLOGY | Facility: CLINIC | Age: 46
End: 2021-09-28

## 2021-09-28 VITALS
SYSTOLIC BLOOD PRESSURE: 120 MMHG | BODY MASS INDEX: 31.02 KG/M2 | TEMPERATURE: 98 F | WEIGHT: 193 LBS | HEIGHT: 66 IN | DIASTOLIC BLOOD PRESSURE: 80 MMHG | RESPIRATION RATE: 18 BRPM | HEART RATE: 96 BPM | OXYGEN SATURATION: 98 %

## 2021-09-28 DIAGNOSIS — C83.32 DIFFUSE LARGE B-CELL LYMPHOMA OF INTRATHORACIC LYMPH NODES (HCC): Primary | ICD-10-CM

## 2021-09-28 PROCEDURE — 99024 POSTOP FOLLOW-UP VISIT: CPT | Performed by: STUDENT IN AN ORGANIZED HEALTH CARE EDUCATION/TRAINING PROGRAM

## 2021-09-28 NOTE — PROGRESS NOTES
Surgical Oncology Consultation    [address] [facility] CANCER CARE SURGICAL ONCOLOGY Rafael Spicer  [address] [place] [address]    Patient:  Dennis Jenkins  1975  [id number]    Primary Care provider:  Norma Chan MD   [address] [address]    Referring provider:  No referring provider defined for this encounter  Diagnoses and all orders for this visit:    Diffuse large B-cell lymphoma of intrathoracic lymph nodes [facility])        Chief Complaint   Patient presents with    Post-op       No follow-ups on file  Oncology History   Diffuse large B-cell lymphoma of intrathoracic lymph nodes (HCC)   9/21/2021 Initial Diagnosis    Diffuse large B-cell lymphoma of intrathoracic lymph nodes (HCC)     9/27/2021 -  Chemotherapy    DOXOrubicin (ADRIAMYCIN), 50 mg/m2 = 99 mg, Intravenous, Once, 1 of 6 cycles  Administration: 99 mg (9/27/2021)  vincristine (ONCOVIN) chemo infusion, 2 mg (original dose 1 4 mg/m2), Intravenous, Once, 1 of 6 cycles  Dose modification: 2 mg (original dose 1 4 mg/m2, Cycle 1, Reason: Anticipated Tolerance)  Administration: 2 mg (9/27/2021)  cyclophosphamide (CYTOXAN) IVPB, 750 mg/m2 = 1,478 mg, Intravenous, Once, 1 of 6 cycles  Administration: 1,478 mg (9/27/2021)  fosaprepitant (EMEND) IVPB, 150 mg, Intravenous, Once, 1 of 6 cycles  Administration: 150 mg (9/27/2021)  riTUXimab (RITUXAN) subsequent titrated chemo infusion, 375 mg/m2, Intravenous, Once, 0 of 5 cycles  riTUXimab (RITUXAN) first titrated chemo infusion, 375 mg/m2 = 738 8 mg, Intravenous, Once, 1 of 1 cycle  Administration: 738 8 mg (9/27/2021)         History of Present Illness  : This is a 33-year-old gentleman with a new diagnosis of likely lymphoma  Briefly, couple weeks ago the patient was having some chest pressure, and underwent a chest x-ray revealing wide mediastinum    This was followed by CT scan, which revealed a large anterior mediastinal mass as well as bilateral supraclavicular lymphadenopathy  He saw Dr Hill motley, his suspected lymphoma diagnosis, and refer the patient to me for an excisional lymph node biopsy  The patient states that he has had significant fatigue over the last couple of weeks  He also has a decreased appetite with mild weight loss to 5 lb  He denies any night sweats, chills, fevers, bone pain, shortness of breath  Interval History 9/28/21  S/p excisional biopsy  Doing very well  No complaints  Has started treatment for diffuse B cell lymphoma confirmed by lymph node  Review of Systems  Complete ROS Surg Onc:   Constitutional: The ENDORSES recent history of general fatigue, mild recent weight loss, DECREASED appetite  Eyes: No complaints of visual problems, no scleral icterus  ENT: No complaints of ear pain, no hoarseness, no difficulty swallowing,  no tinnitus and no new masses in head, oral cavity, or neck  Cardiovascular: No complaints of chest pain, no palpitations, no ankle edema  Respiratory: No complaints of shortness of breath, no cough  Gastrointestinal: No complaints of jaundice, no bloody stools, no pale stools  Genitourinary: No complaints of dysuria, no hematuria, no nocturia, no frequent urination, no urethral discharge  Musculoskeletal: No complaints of weakness, paralysis, joint stiffness or arthralgias  Integumentary: No complaints of rash, no new lesions  Neurological: No complaints of convulsions, no seizures, no dizziness  Hematologic/Lymphatic: No complaints of easy bruising  Endocrine:  No hot or cold intolerance  No polydipsia, polyphagia, or polyuria  Allergy/immunology:  No environmental allergies  No food allergies  Not immunocompromised        Patient Active Problem List   Diagnosis    Supraclavicular lymphadenopathy    Hypertension    Smoking    Chemotherapy induced neutropenia (HCC)    Diffuse large B-cell lymphoma of intrathoracic lymph nodes ([facility] [address] )     Past Medical History: Diagnosis Date    Cancer [facility])     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    Hypertension      Past Surgical History:   Procedure Laterality Date    LYMPH NODE BIOPSY Right 9/15/2021    Procedure: EXCISIONAL BIOPSY OF RIGHT NECK LYMPH NODE WITH LYMPHOMA PROTOCOL;  Surgeon: Francisco Javier Hastings MD;  [facility];  Service: Surgical Oncology    TOOTH EXTRACTION  02/16/2021     Family History   Problem Relation Age of Onset    Hypertension Family     Cancer Family      Social History     Socioeconomic History    Marital status: /Civil Union     Spouse name: Not on file    Number of children: Not on file    Years of education: Not on file    Highest education level: Not on file   Occupational History    Not on file   Tobacco Use    Smoking status: Current Every Day Smoker     Packs/day: 0 50     Types: Cigarettes    Smokeless tobacco: Never Used    Tobacco comment: instructed on QUIT line, says his job provides nicotine patches    Substance and Sexual Activity    Alcohol use: Yes     Comment: rarely    Drug use: Never    Sexual activity: Not on file   Other Topics Concern    Not on file   Social History Narrative    Not on file     Social Determinants of Health     Financial Resource Strain:     Difficulty of Paying Living Expenses:    Food Insecurity:     Worried About [address] in the Last Year:     [address] in the Last Year:    Transportation Needs:     Lack of Transportation (Medical):      Lack of Transportation (Non-Medical):    Physical Activity:     Days of Exercise per Week:     Minutes of Exercise per Session:    Stress:     Feeling of Stress :    Social Connections:     Frequency of Communication with Friends and Family:     Frequency of Social Gatherings with Friends and Family:     Attends Rastafari Services:     Active Member of Clubs or Organizations:     Attends Club or Organization Meetings:     Marital Status:    Intimate Partner Violence:     Fear of Current or Ex-Partner:     Emotionally Abused:     Physically Abused:     Sexually Abused:        Current Outpatient Medications:     ALPRAZolam (XANAX) 0 5 mg tablet, Take 0 5 mg by mouth daily at bedtime as needed for anxiety, Disp: , Rfl:     bisoprolol-hydrochlorothiazide (ZIAC) 5-6 25 MG per tablet, Take 1 tablet by mouth daily, Disp: , Rfl:     ELDERBERRY PO, Take 62 mg by mouth daily, Disp: , Rfl:     ibuprofen (MOTRIN) 200 mg tablet, Take by mouth every 6 (six) hours as needed for mild pain, Disp: , Rfl:     Multiple Vitamins-Minerals (EMERGEN-C IMMUNE PO), Take by mouth As needed, Disp: , Rfl:     ondansetron (ZOFRAN) 4 mg tablet, Take 1 tablet (4 mg total) by mouth every 8 (eight) hours as needed for nausea or vomiting, Disp: 30 tablet, Rfl: 1    predniSONE 50 mg tablet, Take 1 tablet (50 mg total) by mouth 2 (two) times a day with meals, Disp: 30 tablet, Rfl: 0    traMADol (ULTRAM) 50 mg tablet, Take 1 tablet (50 mg total) by mouth every 6 (six) hours as needed for moderate pain, Disp: 12 tablet, Rfl: 0  No current facility-administered medications for this visit  No Known Allergies    Vitals:    09/28/21 0759   BP: 120/80   Pulse: 96   Resp: 18   Temp: 98 °F (36 7 °C)   SpO2: 98%       Physical Exam   General: Appears well, appears stated age  Skin: Warm, anicteric  HEENT: Normocephalic, atraumatic; sclera aniceteric, mucous membranes moist; cervical nodes without adenopathy  Cardiopulmonary: RRR, Easy WOB, no BLE edema  Abd: Flat and soft, nontender, no masses appreciated, no hepatosplenomegaly  MSK: Symmetric, no cyanosis, no overt weakness  Lymphatic: No cervical, axillary or inguinal lymphadenopathy     Well healed incision RT supraclav  Neuro: Affect appropriate, no gross motor abnormalities      Labs: Reviewed in EPIC    Imaging  CT soft tissue neck w contrast    Result Date: 9/13/2021  Narrative: CT NECK WITH CONTRAST INDICATION:   R59 0: Localized enlarged lymph nodes  COMPARISON:  None  TECHNIQUE:  Axial, sagittal, and coronal 2D reformatted images were created from the axial source data and submitted for interpretation  Radiation dose length product (DLP) for this visit:  891 mGy-cm   This examination, like all CT scans performed in the [facility], was performed utilizing techniques to minimize radiation dose exposure, including the use of iterative reconstruction and automated exposure control  IV Contrast:  85 mL of iohexol (OMNIPAQUE) IMAGE QUALITY:  Diagnostic  FINDINGS: VISUALIZED BRAIN PARENCHYMA:  No acute intracranial pathology of the visualized brain parenchyma  VISUALIZED ORBITS AND PARANASAL SINUSES:  Unremarkable orbits  Mild mucosal thickening of the maxillary sinuses  NASAL CAVITY AND NASOPHARYNX:  Normal  SUPRAHYOID NECK:  Normal oral cavity, tongue base, tonsillar fossa and epiglottis  INFRAHYOID NECK:  Aryepiglottic folds and piriform sinuses are normal   Normal glottis and subglottic airway  THYROID GLAND:  Unremarkable  PAROTID AND SUBMANDIBULAR GLANDS:  Normal  LYMPH NODES:  Within the neck there are small supraclavicular nodes demonstrating slightly heterogeneous enhancement  The largest of these on the left measures 2 4 x 1 5 cm and the largest on the right measures 2 2 x 1 8 cm  Small posterior jugular chain nodes are identified within the left neck, level 5  Evaluation of the upper mediastinum demonstrates a large heterogeneously enhancing mass within the anterior superior mediastinum, likely a large elvia mass  Multiple additional mediastinal nodes are seen in the anterior mediastinum, paratracheal, prevascular regions as well as suprahilar regions  VASCULAR STRUCTURES:  Normal enhancement of the cervical vasculature  THORACIC INLET:  See above description of mediastinal masses/adenopathy  The upper lung fields are clear  BONY STRUCTURES: No acute osseous abnormality       Impression: There are multiple supraclavicular nodes identified bilaterally measuring up to 2 4 cm in long axis  Additional small posterior jugular chain/level 5 nodes are present  Large mediastinal mass within the anterior superior mediastinum with multiple surrounding slightly heterogeneously enhancing enlarged lymph nodes extending inferiorly beyond the scope of this exam   Differential considerations would include lymphoma as well as other neoplastic etiologies  Prior outside imaging of the chest is not immediately available for comparison  No imaging of the abdomen or pelvis recently  Tissue sampling recommended  Workstation performed: [id number]       I independently reviewed and interpreted the above laboratory and imaging data  Discussion/Summary:    47-year-old gentleman s/ excisional LN biopsy of RT supraclav node  Doing well  Has started treatment for his diffuse B cell lymphoma   Will f/u PRN

## 2021-09-28 NOTE — PROGRESS NOTES
Received call from pt asking if he needs a port, as he very much does not want one  Explained yesterday's treatment (peripheral line) was an exception as we needed him to start treatment as soon as possible  Explained need due to vesicant administration  Pt reluctantly agreed

## 2021-10-01 ENCOUNTER — TELEPHONE (OUTPATIENT)
Dept: HEMATOLOGY ONCOLOGY | Facility: CLINIC | Age: 46
End: 2021-10-01

## 2021-10-04 ENCOUNTER — APPOINTMENT (OUTPATIENT)
Dept: RADIOLOGY | Age: 46
End: 2021-10-04
Payer: COMMERCIAL

## 2021-10-04 ENCOUNTER — TELEPHONE (OUTPATIENT)
Dept: HEMATOLOGY ONCOLOGY | Facility: CLINIC | Age: 46
End: 2021-10-04

## 2021-10-04 DIAGNOSIS — R05.9 COUGH: ICD-10-CM

## 2021-10-04 DIAGNOSIS — R05.9 COUGH: Primary | ICD-10-CM

## 2021-10-04 PROCEDURE — 71046 X-RAY EXAM CHEST 2 VIEWS: CPT

## 2021-10-06 RX ORDER — FAMOTIDINE 20 MG/1
20 TABLET, FILM COATED ORAL 2 TIMES DAILY
COMMUNITY

## 2021-10-12 ENCOUNTER — APPOINTMENT (OUTPATIENT)
Dept: RADIOLOGY | Facility: AMBULARY SURGERY CENTER | Age: 46
End: 2021-10-12
Payer: COMMERCIAL

## 2021-10-12 ENCOUNTER — HOSPITAL ENCOUNTER (OUTPATIENT)
Facility: AMBULARY SURGERY CENTER | Age: 46
Setting detail: OUTPATIENT SURGERY
Discharge: HOME | End: 2021-10-12
Attending: RADIOLOGY | Admitting: RADIOLOGY
Payer: COMMERCIAL

## 2021-10-12 ENCOUNTER — ANESTHESIA (OUTPATIENT)
Dept: PERIOP | Facility: AMBULARY SURGERY CENTER | Age: 46
End: 2021-10-12
Payer: COMMERCIAL

## 2021-10-12 VITALS
BODY MASS INDEX: 28.41 KG/M2 | TEMPERATURE: 97 F | HEIGHT: 67 IN | RESPIRATION RATE: 16 BRPM | OXYGEN SATURATION: 97 % | SYSTOLIC BLOOD PRESSURE: 107 MMHG | WEIGHT: 181 LBS | DIASTOLIC BLOOD PRESSURE: 73 MMHG | HEART RATE: 72 BPM

## 2021-10-12 PROCEDURE — 77001 FLUOROGUIDE FOR VEIN DEVICE: CPT

## 2021-10-12 PROCEDURE — 77001 FLUOROGUIDE FOR VEIN DEVICE: CPT | Performed by: RADIOLOGY

## 2021-10-12 PROCEDURE — 76937 US GUIDE VASCULAR ACCESS: CPT | Performed by: RADIOLOGY

## 2021-10-12 PROCEDURE — C1788 PORT, INDWELLING, IMP: HCPCS | Performed by: RADIOLOGY

## 2021-10-12 PROCEDURE — 36561 INSERT TUNNELED CV CATH: CPT | Performed by: RADIOLOGY

## 2021-10-12 DEVICE — PORT DIGINTY 8FR MICRO-STICK KIT HEMODIAL MID SIZE: Type: IMPLANTABLE DEVICE | Site: CHEST | Status: FUNCTIONAL

## 2021-10-12 RX ORDER — SODIUM CHLORIDE 9 MG/ML
INJECTION, SOLUTION INTRAVENOUS AS NEEDED
Status: DISCONTINUED | OUTPATIENT
Start: 2021-10-12 | End: 2021-10-12 | Stop reason: HOSPADM

## 2021-10-12 RX ORDER — ONDANSETRON 2 MG/ML
4 INJECTION INTRAMUSCULAR; INTRAVENOUS EVERY 4 HOURS PRN
Status: CANCELLED | OUTPATIENT
Start: 2021-10-12

## 2021-10-12 RX ORDER — LIDOCAINE HYDROCHLORIDE AND EPINEPHRINE 10; 10 MG/ML; UG/ML
INJECTION, SOLUTION INFILTRATION; PERINEURAL AS NEEDED
Status: DISCONTINUED | OUTPATIENT
Start: 2021-10-12 | End: 2021-10-12 | Stop reason: HOSPADM

## 2021-10-12 RX ORDER — MIDAZOLAM HYDROCHLORIDE 2 MG/2ML
INJECTION, SOLUTION INTRAMUSCULAR; INTRAVENOUS AS NEEDED
Status: DISCONTINUED | OUTPATIENT
Start: 2021-10-12 | End: 2021-10-12

## 2021-10-12 RX ORDER — FENTANYL CITRATE 50 UG/ML
INJECTION, SOLUTION INTRAMUSCULAR; INTRAVENOUS AS NEEDED
Status: DISCONTINUED | OUTPATIENT
Start: 2021-10-12 | End: 2021-10-12

## 2021-10-12 RX ORDER — SODIUM CHLORIDE, SODIUM LACTATE, POTASSIUM CHLORIDE, CALCIUM CHLORIDE 600; 310; 30; 20 MG/100ML; MG/100ML; MG/100ML; MG/100ML
INJECTION, SOLUTION INTRAVENOUS CONTINUOUS PRN
Status: DISCONTINUED | OUTPATIENT
Start: 2021-10-12 | End: 2021-10-12

## 2021-10-12 RX ORDER — CEFAZOLIN SODIUM 2 G/50ML
2000 SOLUTION INTRAVENOUS ONCE
Status: COMPLETED | OUTPATIENT
Start: 2021-10-12 | End: 2021-10-12

## 2021-10-12 RX ADMIN — CEFAZOLIN SODIUM 2000 MG: 2 SOLUTION INTRAVENOUS at 09:06

## 2021-10-12 RX ADMIN — FENTANYL CITRATE 50 MCG: 50 INJECTION, SOLUTION INTRAMUSCULAR; INTRAVENOUS at 09:30

## 2021-10-12 RX ADMIN — MIDAZOLAM HYDROCHLORIDE 1 MG: 1 INJECTION, SOLUTION INTRAMUSCULAR; INTRAVENOUS at 09:25

## 2021-10-12 RX ADMIN — FENTANYL CITRATE 25 MCG: 50 INJECTION, SOLUTION INTRAMUSCULAR; INTRAVENOUS at 09:34

## 2021-10-12 RX ADMIN — MIDAZOLAM HYDROCHLORIDE 1 MG: 1 INJECTION, SOLUTION INTRAMUSCULAR; INTRAVENOUS at 09:35

## 2021-10-12 RX ADMIN — MIDAZOLAM HYDROCHLORIDE 1 MG: 1 INJECTION, SOLUTION INTRAMUSCULAR; INTRAVENOUS at 09:30

## 2021-10-12 RX ADMIN — MIDAZOLAM HYDROCHLORIDE 1 MG: 1 INJECTION, SOLUTION INTRAMUSCULAR; INTRAVENOUS at 09:39

## 2021-10-12 RX ADMIN — SODIUM CHLORIDE, SODIUM LACTATE, POTASSIUM CHLORIDE, AND CALCIUM CHLORIDE: .6; .31; .03; .02 INJECTION, SOLUTION INTRAVENOUS at 08:44

## 2021-10-12 RX ADMIN — FENTANYL CITRATE 25 MCG: 50 INJECTION, SOLUTION INTRAMUSCULAR; INTRAVENOUS at 09:51

## 2021-10-14 ENCOUNTER — APPOINTMENT (OUTPATIENT)
Dept: LAB | Age: 46
End: 2021-10-14
Payer: COMMERCIAL

## 2021-10-14 DIAGNOSIS — C83.32 DIFFUSE LARGE B-CELL LYMPHOMA OF INTRATHORACIC LYMPH NODES (HCC): ICD-10-CM

## 2021-10-14 LAB
ALBUMIN SERPL BCP-MCNC: 3.2 G/DL (ref 3.5–5)
ALP SERPL-CCNC: 112 U/L (ref 46–116)
ALT SERPL W P-5'-P-CCNC: 58 U/L (ref 12–78)
ANION GAP SERPL CALCULATED.3IONS-SCNC: 3 MMOL/L (ref 4–13)
ANISOCYTOSIS BLD QL SMEAR: PRESENT
ARTIFACT: PRESENT
AST SERPL W P-5'-P-CCNC: 13 U/L (ref 5–45)
BASOPHILS # BLD MANUAL: 0.18 THOUSAND/UL (ref 0–0.1)
BASOPHILS NFR MAR MANUAL: 3 % (ref 0–1)
BILIRUB SERPL-MCNC: 0.27 MG/DL (ref 0.2–1)
BUN SERPL-MCNC: 14 MG/DL (ref 5–25)
CALCIUM ALBUM COR SERPL-MCNC: 10.4 MG/DL (ref 8.3–10.1)
CALCIUM SERPL-MCNC: 9.8 MG/DL (ref 8.3–10.1)
CHLORIDE SERPL-SCNC: 106 MMOL/L (ref 100–108)
CO2 SERPL-SCNC: 28 MMOL/L (ref 21–32)
CREAT SERPL-MCNC: 0.79 MG/DL (ref 0.6–1.3)
EOSINOPHIL # BLD MANUAL: 0 THOUSAND/UL (ref 0–0.4)
EOSINOPHIL NFR BLD MANUAL: 0 % (ref 0–6)
ERYTHROCYTE [DISTWIDTH] IN BLOOD BY AUTOMATED COUNT: 12.9 % (ref 11.6–15.1)
GFR SERPL CREATININE-BSD FRML MDRD: 109 ML/MIN/1.73SQ M
GIANT PLATELETS BLD QL SMEAR: PRESENT
GLUCOSE SERPL-MCNC: 127 MG/DL (ref 65–140)
HCT VFR BLD AUTO: 41.3 % (ref 36.5–49.3)
HGB BLD-MCNC: 13.5 G/DL (ref 12–17)
LYMPHOCYTES # BLD AUTO: 0.82 THOUSAND/UL (ref 0.6–4.47)
LYMPHOCYTES # BLD AUTO: 14 % (ref 14–44)
MCH RBC QN AUTO: 27.8 PG (ref 26.8–34.3)
MCHC RBC AUTO-ENTMCNC: 32.7 G/DL (ref 31.4–37.4)
MCV RBC AUTO: 85 FL (ref 82–98)
METAMYELOCYTES NFR BLD MANUAL: 1 % (ref 0–1)
MONOCYTES # BLD AUTO: 1.29 THOUSAND/UL (ref 0–1.22)
MONOCYTES NFR BLD: 22 % (ref 4–12)
MYELOCYTES NFR BLD MANUAL: 1 % (ref 0–1)
NEUTROPHILS # BLD MANUAL: 3.47 THOUSAND/UL (ref 1.85–7.62)
NEUTS BAND NFR BLD MANUAL: 3 % (ref 0–8)
NEUTS SEG NFR BLD AUTO: 56 % (ref 43–75)
PLATELET # BLD AUTO: 507 THOUSANDS/UL (ref 149–390)
PLATELET BLD QL SMEAR: ABNORMAL
PMV BLD AUTO: 8.6 FL (ref 8.9–12.7)
POLYCHROMASIA BLD QL SMEAR: PRESENT
POTASSIUM SERPL-SCNC: 4.4 MMOL/L (ref 3.5–5.3)
PROT SERPL-MCNC: 7.3 G/DL (ref 6.4–8.2)
RBC # BLD AUTO: 4.85 MILLION/UL (ref 3.88–5.62)
RBC MORPH BLD: PRESENT
SODIUM SERPL-SCNC: 137 MMOL/L (ref 136–145)
WBC # BLD AUTO: 5.88 THOUSAND/UL (ref 4.31–10.16)

## 2021-10-14 PROCEDURE — 36415 COLL VENOUS BLD VENIPUNCTURE: CPT

## 2021-10-14 PROCEDURE — 80053 COMPREHEN METABOLIC PANEL: CPT

## 2021-10-14 PROCEDURE — 85007 BL SMEAR W/DIFF WBC COUNT: CPT

## 2021-10-14 PROCEDURE — 85027 COMPLETE CBC AUTOMATED: CPT

## 2021-10-15 ENCOUNTER — OFFICE VISIT (OUTPATIENT)
Dept: HEMATOLOGY ONCOLOGY | Facility: CLINIC | Age: 46
End: 2021-10-15
Payer: COMMERCIAL

## 2021-10-15 VITALS
SYSTOLIC BLOOD PRESSURE: 116 MMHG | OXYGEN SATURATION: 99 % | WEIGHT: 185.5 LBS | DIASTOLIC BLOOD PRESSURE: 86 MMHG | RESPIRATION RATE: 16 BRPM | HEART RATE: 66 BPM | TEMPERATURE: 97.8 F | HEIGHT: 66 IN | BODY MASS INDEX: 29.81 KG/M2

## 2021-10-15 DIAGNOSIS — T45.1X5A CHEMOTHERAPY INDUCED NEUTROPENIA (HCC): ICD-10-CM

## 2021-10-15 DIAGNOSIS — C83.32 DIFFUSE LARGE B-CELL LYMPHOMA OF INTRATHORACIC LYMPH NODES (HCC): Primary | ICD-10-CM

## 2021-10-15 DIAGNOSIS — D70.1 CHEMOTHERAPY INDUCED NEUTROPENIA (HCC): ICD-10-CM

## 2021-10-15 PROCEDURE — 99214 OFFICE O/P EST MOD 30 MIN: CPT | Performed by: INTERNAL MEDICINE

## 2021-10-18 ENCOUNTER — HOSPITAL ENCOUNTER (OUTPATIENT)
Dept: INFUSION CENTER | Facility: HOSPITAL | Age: 46
End: 2021-10-18
Attending: INTERNAL MEDICINE

## 2021-10-19 ENCOUNTER — HOSPITAL ENCOUNTER (OUTPATIENT)
Dept: INFUSION CENTER | Facility: HOSPITAL | Age: 46
Discharge: HOME | End: 2021-10-19
Attending: INTERNAL MEDICINE
Payer: COMMERCIAL

## 2021-10-19 VITALS
WEIGHT: 190.7 LBS | BODY MASS INDEX: 28.9 KG/M2 | DIASTOLIC BLOOD PRESSURE: 90 MMHG | SYSTOLIC BLOOD PRESSURE: 139 MMHG | TEMPERATURE: 97.1 F | RESPIRATION RATE: 18 BRPM | HEIGHT: 68 IN | HEART RATE: 78 BPM

## 2021-10-19 DIAGNOSIS — D70.1 CHEMOTHERAPY INDUCED NEUTROPENIA (HCC): ICD-10-CM

## 2021-10-19 DIAGNOSIS — T45.1X5A CHEMOTHERAPY INDUCED NEUTROPENIA (HCC): ICD-10-CM

## 2021-10-19 DIAGNOSIS — C83.32 DIFFUSE LARGE B-CELL LYMPHOMA OF INTRATHORACIC LYMPH NODES (HCC): Primary | ICD-10-CM

## 2021-10-19 PROCEDURE — 96411 CHEMO IV PUSH ADDL DRUG: CPT

## 2021-10-19 PROCEDURE — 96413 CHEMO IV INFUSION 1 HR: CPT

## 2021-10-19 PROCEDURE — 96377 APPLICATON ON-BODY INJECTOR: CPT

## 2021-10-19 PROCEDURE — 96415 CHEMO IV INFUSION ADDL HR: CPT

## 2021-10-19 PROCEDURE — 96417 CHEMO IV INFUS EACH ADDL SEQ: CPT

## 2021-10-19 PROCEDURE — 96367 TX/PROPH/DG ADDL SEQ IV INF: CPT

## 2021-10-19 RX ORDER — DOXORUBICIN HYDROCHLORIDE 2 MG/ML
100 INJECTION, SOLUTION INTRAVENOUS ONCE
Status: COMPLETED | OUTPATIENT
Start: 2021-10-19 | End: 2021-10-19

## 2021-10-19 RX ORDER — SODIUM CHLORIDE 9 MG/ML
20 INJECTION, SOLUTION INTRAVENOUS ONCE
Status: COMPLETED | OUTPATIENT
Start: 2021-10-19 | End: 2021-10-19

## 2021-10-19 RX ORDER — ACETAMINOPHEN 325 MG/1
650 TABLET ORAL ONCE
Status: COMPLETED | OUTPATIENT
Start: 2021-10-19 | End: 2021-10-19

## 2021-10-19 RX ADMIN — CYCLOPHOSPHAMIDE 1500 MG: 1 INJECTION, POWDER, FOR SOLUTION INTRAVENOUS; ORAL at 14:03

## 2021-10-19 RX ADMIN — DIPHENHYDRAMINE HYDROCHLORIDE 25 MG: 50 INJECTION, SOLUTION INTRAMUSCULAR; INTRAVENOUS at 08:06

## 2021-10-19 RX ADMIN — FOSAPREPITANT DIMEGLUMINE 150 MG: 150 INJECTION, POWDER, LYOPHILIZED, FOR SOLUTION INTRAVENOUS at 13:22

## 2021-10-19 RX ADMIN — SODIUM CHLORIDE 20 ML/HR: 0.9 INJECTION, SOLUTION INTRAVENOUS at 08:06

## 2021-10-19 RX ADMIN — RITUXIMAB 738.8 MG: 10 INJECTION, SOLUTION INTRAVENOUS at 09:15

## 2021-10-19 RX ADMIN — PEGFILGRASTIM 6 MG: KIT SUBCUTANEOUS at 15:35

## 2021-10-19 RX ADMIN — ONDANSETRON: 2 SOLUTION INTRAMUSCULAR; INTRAVENOUS at 13:02

## 2021-10-19 RX ADMIN — DOXORUBICIN HYDROCHLORIDE 100 MG: 2 INJECTION, SOLUTION INTRAVENOUS at 14:41

## 2021-10-19 RX ADMIN — VINCRISTINE SULFATE 2 MG: 1 INJECTION, SOLUTION INTRAVENOUS at 14:54

## 2021-10-19 RX ADMIN — ACETAMINOPHEN 650 MG: 325 TABLET ORAL at 08:28

## 2021-11-01 ENCOUNTER — OFFICE VISIT (OUTPATIENT)
Dept: HEMATOLOGY ONCOLOGY | Facility: CLINIC | Age: 46
End: 2021-11-01
Payer: COMMERCIAL

## 2021-11-01 VITALS
RESPIRATION RATE: 16 BRPM | BODY MASS INDEX: 28.95 KG/M2 | DIASTOLIC BLOOD PRESSURE: 74 MMHG | SYSTOLIC BLOOD PRESSURE: 126 MMHG | TEMPERATURE: 97.6 F | HEART RATE: 69 BPM | HEIGHT: 68 IN | OXYGEN SATURATION: 95 % | WEIGHT: 191 LBS

## 2021-11-01 DIAGNOSIS — C83.32 DIFFUSE LARGE B-CELL LYMPHOMA OF INTRATHORACIC LYMPH NODES (HCC): Primary | ICD-10-CM

## 2021-11-01 PROCEDURE — 99213 OFFICE O/P EST LOW 20 MIN: CPT | Performed by: INTERNAL MEDICINE

## 2021-11-04 ENCOUNTER — APPOINTMENT (OUTPATIENT)
Dept: LAB | Age: 46
End: 2021-11-04
Payer: COMMERCIAL

## 2021-11-04 DIAGNOSIS — C83.32 DIFFUSE LARGE B-CELL LYMPHOMA OF INTRATHORACIC LYMPH NODES (HCC): ICD-10-CM

## 2021-11-04 LAB
ALBUMIN SERPL BCP-MCNC: 3.5 G/DL (ref 3.5–5)
ALP SERPL-CCNC: 119 U/L (ref 46–116)
ALT SERPL W P-5'-P-CCNC: 43 U/L (ref 12–78)
ANION GAP SERPL CALCULATED.3IONS-SCNC: 3 MMOL/L (ref 4–13)
ARTIFACT: PRESENT
AST SERPL W P-5'-P-CCNC: 10 U/L (ref 5–45)
BASOPHILS # BLD MANUAL: 0 THOUSAND/UL (ref 0–0.1)
BASOPHILS NFR MAR MANUAL: 0 % (ref 0–1)
BILIRUB SERPL-MCNC: 0.42 MG/DL (ref 0.2–1)
BUN SERPL-MCNC: 12 MG/DL (ref 5–25)
CALCIUM SERPL-MCNC: 9.5 MG/DL (ref 8.3–10.1)
CHLORIDE SERPL-SCNC: 105 MMOL/L (ref 100–108)
CO2 SERPL-SCNC: 27 MMOL/L (ref 21–32)
CREAT SERPL-MCNC: 0.76 MG/DL (ref 0.6–1.3)
EOSINOPHIL # BLD MANUAL: 0 THOUSAND/UL (ref 0–0.4)
EOSINOPHIL NFR BLD MANUAL: 0 % (ref 0–6)
ERYTHROCYTE [DISTWIDTH] IN BLOOD BY AUTOMATED COUNT: 14.8 % (ref 11.6–15.1)
GFR SERPL CREATININE-BSD FRML MDRD: 111 ML/MIN/1.73SQ M
GLUCOSE P FAST SERPL-MCNC: 134 MG/DL (ref 65–99)
HCT VFR BLD AUTO: 39.7 % (ref 36.5–49.3)
HGB BLD-MCNC: 13 G/DL (ref 12–17)
LYMPHOCYTES # BLD AUTO: 0.88 THOUSAND/UL (ref 0.6–4.47)
LYMPHOCYTES # BLD AUTO: 9 % (ref 14–44)
MCH RBC QN AUTO: 28.1 PG (ref 26.8–34.3)
MCHC RBC AUTO-ENTMCNC: 32.7 G/DL (ref 31.4–37.4)
MCV RBC AUTO: 86 FL (ref 82–98)
MONOCYTES # BLD AUTO: 0.59 THOUSAND/UL (ref 0–1.22)
MONOCYTES NFR BLD: 6 % (ref 4–12)
NEUTROPHILS # BLD MANUAL: 8.3 THOUSAND/UL (ref 1.85–7.62)
NEUTS BAND NFR BLD MANUAL: 1 % (ref 0–8)
NEUTS SEG NFR BLD AUTO: 84 % (ref 43–75)
NRBC BLD AUTO-RTO: 1 /100 WBC (ref 0–2)
PLATELET # BLD AUTO: 160 THOUSANDS/UL (ref 149–390)
PLATELET BLD QL SMEAR: ADEQUATE
PMV BLD AUTO: 9.8 FL (ref 8.9–12.7)
POTASSIUM SERPL-SCNC: 3.8 MMOL/L (ref 3.5–5.3)
PROT SERPL-MCNC: 6.9 G/DL (ref 6.4–8.2)
RBC # BLD AUTO: 4.62 MILLION/UL (ref 3.88–5.62)
RBC MORPH BLD: PRESENT
SODIUM SERPL-SCNC: 135 MMOL/L (ref 136–145)
TOXIC GRANULES BLD QL SMEAR: PRESENT
WBC # BLD AUTO: 9.77 THOUSAND/UL (ref 4.31–10.16)

## 2021-11-04 PROCEDURE — 85007 BL SMEAR W/DIFF WBC COUNT: CPT

## 2021-11-04 PROCEDURE — 80053 COMPREHEN METABOLIC PANEL: CPT

## 2021-11-04 PROCEDURE — 85027 COMPLETE CBC AUTOMATED: CPT

## 2021-11-04 PROCEDURE — 36415 COLL VENOUS BLD VENIPUNCTURE: CPT

## 2021-11-08 ENCOUNTER — HOSPITAL ENCOUNTER (OUTPATIENT)
Dept: INFUSION CENTER | Facility: CLINIC | Age: 46
Discharge: HOME | End: 2021-11-08
Payer: COMMERCIAL

## 2021-11-08 VITALS
SYSTOLIC BLOOD PRESSURE: 149 MMHG | TEMPERATURE: 97.1 F | OXYGEN SATURATION: 97 % | HEIGHT: 68 IN | HEART RATE: 70 BPM | DIASTOLIC BLOOD PRESSURE: 82 MMHG | WEIGHT: 174.38 LBS | BODY MASS INDEX: 26.43 KG/M2 | RESPIRATION RATE: 18 BRPM

## 2021-11-08 DIAGNOSIS — D70.1 CHEMOTHERAPY INDUCED NEUTROPENIA (HCC): ICD-10-CM

## 2021-11-08 DIAGNOSIS — C83.32 DIFFUSE LARGE B-CELL LYMPHOMA OF INTRATHORACIC LYMPH NODES (HCC): Primary | ICD-10-CM

## 2021-11-08 DIAGNOSIS — T45.1X5A CHEMOTHERAPY INDUCED NEUTROPENIA (HCC): ICD-10-CM

## 2021-11-08 PROCEDURE — 96411 CHEMO IV PUSH ADDL DRUG: CPT

## 2021-11-08 PROCEDURE — 96377 APPLICATON ON-BODY INJECTOR: CPT

## 2021-11-08 PROCEDURE — 96413 CHEMO IV INFUSION 1 HR: CPT

## 2021-11-08 PROCEDURE — 96415 CHEMO IV INFUSION ADDL HR: CPT

## 2021-11-08 PROCEDURE — 96417 CHEMO IV INFUS EACH ADDL SEQ: CPT

## 2021-11-08 PROCEDURE — 96367 TX/PROPH/DG ADDL SEQ IV INF: CPT

## 2021-11-08 RX ORDER — DOXORUBICIN HYDROCHLORIDE 2 MG/ML
50 INJECTION, SOLUTION INTRAVENOUS ONCE
Status: COMPLETED | OUTPATIENT
Start: 2021-11-08 | End: 2021-11-08

## 2021-11-08 RX ORDER — SODIUM CHLORIDE 9 MG/ML
20 INJECTION, SOLUTION INTRAVENOUS ONCE
Status: COMPLETED | OUTPATIENT
Start: 2021-11-08 | End: 2021-11-08

## 2021-11-08 RX ORDER — ACETAMINOPHEN 325 MG/1
650 TABLET ORAL ONCE
Status: COMPLETED | OUTPATIENT
Start: 2021-11-08 | End: 2021-11-08

## 2021-11-08 RX ADMIN — DIPHENHYDRAMINE HYDROCHLORIDE 25 MG: 50 INJECTION, SOLUTION INTRAMUSCULAR; INTRAVENOUS at 07:52

## 2021-11-08 RX ADMIN — PEGFILGRASTIM 6 MG: KIT SUBCUTANEOUS at 14:07

## 2021-11-08 RX ADMIN — DOXORUBICIN HYDROCHLORIDE 99 MG: 2 INJECTION, SOLUTION INTRAVENOUS at 13:15

## 2021-11-08 RX ADMIN — SODIUM CHLORIDE 20 ML/HR: 0.9 INJECTION, SOLUTION INTRAVENOUS at 07:55

## 2021-11-08 RX ADMIN — RITUXIMAB 738.8 MG: 10 INJECTION, SOLUTION INTRAVENOUS at 08:50

## 2021-11-08 RX ADMIN — CYCLOPHOSPHAMIDE 1500 MG: 1 INJECTION, POWDER, FOR SOLUTION INTRAVENOUS; ORAL at 12:27

## 2021-11-08 RX ADMIN — DEXAMETHASONE SODIUM PHOSPHATE: 10 INJECTION, SOLUTION INTRAMUSCULAR; INTRAVENOUS at 11:32

## 2021-11-08 RX ADMIN — ACETAMINOPHEN 650 MG: 325 TABLET ORAL at 07:51

## 2021-11-08 RX ADMIN — FOSAPREPITANT 150 MG: 150 INJECTION, POWDER, LYOPHILIZED, FOR SOLUTION INTRAVENOUS at 11:55

## 2021-11-08 RX ADMIN — VINCRISTINE SULFATE 2 MG: 1 INJECTION, SOLUTION INTRAVENOUS at 13:30

## 2021-11-09 ENCOUNTER — TELEPHONE (OUTPATIENT)
Dept: HEMATOLOGY ONCOLOGY | Facility: CLINIC | Age: 46
End: 2021-11-09

## 2021-11-18 ENCOUNTER — HOSPITAL ENCOUNTER (OUTPATIENT)
Dept: CT IMAGING | Facility: HOSPITAL | Age: 46
Discharge: HOME | End: 2021-11-18
Payer: COMMERCIAL

## 2021-11-18 DIAGNOSIS — C83.32 DIFFUSE LARGE B-CELL LYMPHOMA OF INTRATHORACIC LYMPH NODES (HCC): ICD-10-CM

## 2021-11-18 PROCEDURE — 71260 CT THORAX DX C+: CPT

## 2021-11-18 PROCEDURE — G1004 CDSM NDSC: HCPCS

## 2021-11-18 RX ADMIN — IOHEXOL 85 ML: 350 INJECTION, SOLUTION INTRAVENOUS at 08:01

## 2021-11-24 ENCOUNTER — APPOINTMENT (OUTPATIENT)
Dept: LAB | Age: 46
End: 2021-11-24
Payer: COMMERCIAL

## 2021-11-24 DIAGNOSIS — C83.32 DIFFUSE LARGE B-CELL LYMPHOMA OF INTRATHORACIC LYMPH NODES (HCC): ICD-10-CM

## 2021-11-24 LAB
ALBUMIN SERPL BCP-MCNC: 3.6 G/DL (ref 3.5–5)
ALP SERPL-CCNC: 101 U/L (ref 46–116)
ALT SERPL W P-5'-P-CCNC: 34 U/L (ref 12–78)
ANION GAP SERPL CALCULATED.3IONS-SCNC: 8 MMOL/L (ref 4–13)
AST SERPL W P-5'-P-CCNC: 9 U/L (ref 5–45)
BASOPHILS # BLD AUTO: 0.08 THOUSANDS/ΜL (ref 0–0.1)
BASOPHILS NFR BLD AUTO: 1 % (ref 0–1)
BILIRUB SERPL-MCNC: 0.48 MG/DL (ref 0.2–1)
BUN SERPL-MCNC: 17 MG/DL (ref 5–25)
CALCIUM SERPL-MCNC: 9.4 MG/DL (ref 8.3–10.1)
CHLORIDE SERPL-SCNC: 104 MMOL/L (ref 100–108)
CO2 SERPL-SCNC: 25 MMOL/L (ref 21–32)
CREAT SERPL-MCNC: 0.75 MG/DL (ref 0.6–1.3)
EOSINOPHIL # BLD AUTO: 0.03 THOUSAND/ΜL (ref 0–0.61)
EOSINOPHIL NFR BLD AUTO: 1 % (ref 0–6)
ERYTHROCYTE [DISTWIDTH] IN BLOOD BY AUTOMATED COUNT: 17.9 % (ref 11.6–15.1)
GFR SERPL CREATININE-BSD FRML MDRD: 112 ML/MIN/1.73SQ M
GLUCOSE P FAST SERPL-MCNC: 122 MG/DL (ref 65–99)
HCT VFR BLD AUTO: 38.5 % (ref 36.5–49.3)
HGB BLD-MCNC: 12.8 G/DL (ref 12–17)
IMM GRANULOCYTES # BLD AUTO: 0.15 THOUSAND/UL (ref 0–0.2)
IMM GRANULOCYTES NFR BLD AUTO: 2 % (ref 0–2)
LYMPHOCYTES # BLD AUTO: 0.67 THOUSANDS/ΜL (ref 0.6–4.47)
LYMPHOCYTES NFR BLD AUTO: 10 % (ref 14–44)
MCH RBC QN AUTO: 28.8 PG (ref 26.8–34.3)
MCHC RBC AUTO-ENTMCNC: 33.2 G/DL (ref 31.4–37.4)
MCV RBC AUTO: 87 FL (ref 82–98)
MONOCYTES # BLD AUTO: 0.69 THOUSAND/ΜL (ref 0.17–1.22)
MONOCYTES NFR BLD AUTO: 11 % (ref 4–12)
NEUTROPHILS # BLD AUTO: 4.8 THOUSANDS/ΜL (ref 1.85–7.62)
NEUTS SEG NFR BLD AUTO: 75 % (ref 43–75)
NRBC BLD AUTO-RTO: 0 /100 WBCS
PLATELET # BLD AUTO: 236 THOUSANDS/UL (ref 149–390)
PMV BLD AUTO: 9.7 FL (ref 8.9–12.7)
POTASSIUM SERPL-SCNC: 4.1 MMOL/L (ref 3.5–5.3)
PROT SERPL-MCNC: 7.1 G/DL (ref 6.4–8.2)
RBC # BLD AUTO: 4.45 MILLION/UL (ref 3.88–5.62)
SODIUM SERPL-SCNC: 137 MMOL/L (ref 136–145)
WBC # BLD AUTO: 6.42 THOUSAND/UL (ref 4.31–10.16)

## 2021-11-24 PROCEDURE — 85025 COMPLETE CBC W/AUTO DIFF WBC: CPT

## 2021-11-24 PROCEDURE — 36415 COLL VENOUS BLD VENIPUNCTURE: CPT

## 2021-11-24 PROCEDURE — 80053 COMPREHEN METABOLIC PANEL: CPT

## 2021-11-26 ENCOUNTER — OFFICE VISIT (OUTPATIENT)
Dept: HEMATOLOGY ONCOLOGY | Facility: CLINIC | Age: 46
End: 2021-11-26
Payer: COMMERCIAL

## 2021-11-26 VITALS — HEIGHT: 68 IN | BODY MASS INDEX: 26.51 KG/M2 | RESPIRATION RATE: 18 BRPM

## 2021-11-26 DIAGNOSIS — D70.1 CHEMOTHERAPY INDUCED NEUTROPENIA (HCC): ICD-10-CM

## 2021-11-26 DIAGNOSIS — C83.32 DIFFUSE LARGE B-CELL LYMPHOMA OF INTRATHORACIC LYMPH NODES (HCC): Primary | ICD-10-CM

## 2021-11-26 DIAGNOSIS — R59.0 MEDIASTINAL LYMPHADENOPATHY: ICD-10-CM

## 2021-11-26 DIAGNOSIS — T45.1X5A CHEMOTHERAPY INDUCED NEUTROPENIA (HCC): ICD-10-CM

## 2021-11-26 PROCEDURE — 99214 OFFICE O/P EST MOD 30 MIN: CPT | Performed by: INTERNAL MEDICINE

## 2021-11-26 RX ORDER — PREDNISONE 50 MG/1
50 TABLET ORAL 2 TIMES DAILY WITH MEALS
Qty: 30 TABLET | Refills: 0 | Status: SHIPPED | OUTPATIENT
Start: 2021-11-26 | End: 2022-05-23

## 2021-11-29 ENCOUNTER — HOSPITAL ENCOUNTER (OUTPATIENT)
Dept: INFUSION CENTER | Facility: CLINIC | Age: 46
Discharge: HOME | End: 2021-11-29
Payer: COMMERCIAL

## 2021-11-29 VITALS
DIASTOLIC BLOOD PRESSURE: 86 MMHG | OXYGEN SATURATION: 94 % | WEIGHT: 197.8 LBS | TEMPERATURE: 97.6 F | BODY MASS INDEX: 29.98 KG/M2 | HEIGHT: 68 IN | HEART RATE: 68 BPM | SYSTOLIC BLOOD PRESSURE: 139 MMHG | RESPIRATION RATE: 18 BRPM

## 2021-11-29 DIAGNOSIS — C83.32 DIFFUSE LARGE B-CELL LYMPHOMA OF INTRATHORACIC LYMPH NODES (HCC): Primary | ICD-10-CM

## 2021-11-29 DIAGNOSIS — D70.1 CHEMOTHERAPY INDUCED NEUTROPENIA (HCC): ICD-10-CM

## 2021-11-29 DIAGNOSIS — T45.1X5A CHEMOTHERAPY INDUCED NEUTROPENIA (HCC): ICD-10-CM

## 2021-11-29 PROCEDURE — 96367 TX/PROPH/DG ADDL SEQ IV INF: CPT

## 2021-11-29 PROCEDURE — 96415 CHEMO IV INFUSION ADDL HR: CPT

## 2021-11-29 PROCEDURE — 96377 APPLICATON ON-BODY INJECTOR: CPT

## 2021-11-29 PROCEDURE — 96411 CHEMO IV PUSH ADDL DRUG: CPT

## 2021-11-29 PROCEDURE — 96413 CHEMO IV INFUSION 1 HR: CPT

## 2021-11-29 PROCEDURE — 96417 CHEMO IV INFUS EACH ADDL SEQ: CPT

## 2021-11-29 RX ORDER — DOXORUBICIN HYDROCHLORIDE 2 MG/ML
50 INJECTION, SOLUTION INTRAVENOUS ONCE
Status: COMPLETED | OUTPATIENT
Start: 2021-11-29 | End: 2021-11-29

## 2021-11-29 RX ORDER — ACETAMINOPHEN 325 MG/1
650 TABLET ORAL ONCE
Status: COMPLETED | OUTPATIENT
Start: 2021-11-29 | End: 2021-11-29

## 2021-11-29 RX ORDER — SODIUM CHLORIDE 9 MG/ML
20 INJECTION, SOLUTION INTRAVENOUS ONCE
Status: COMPLETED | OUTPATIENT
Start: 2021-11-29 | End: 2021-11-29

## 2021-11-29 RX ADMIN — FOSAPREPITANT 150 MG: 150 INJECTION, POWDER, LYOPHILIZED, FOR SOLUTION INTRAVENOUS at 11:58

## 2021-11-29 RX ADMIN — VINCRISTINE SULFATE 2 MG: 1 INJECTION, SOLUTION INTRAVENOUS at 13:23

## 2021-11-29 RX ADMIN — DIPHENHYDRAMINE HYDROCHLORIDE 25 MG: 50 INJECTION, SOLUTION INTRAMUSCULAR; INTRAVENOUS at 08:06

## 2021-11-29 RX ADMIN — DEXAMETHASONE SODIUM PHOSPHATE: 10 INJECTION, SOLUTION INTRAMUSCULAR; INTRAVENOUS at 11:36

## 2021-11-29 RX ADMIN — PEGFILGRASTIM 6 MG: KIT SUBCUTANEOUS at 13:55

## 2021-11-29 RX ADMIN — SODIUM CHLORIDE 20 ML/HR: 0.9 INJECTION, SOLUTION INTRAVENOUS at 08:10

## 2021-11-29 RX ADMIN — RITUXIMAB 738.8 MG: 10 INJECTION, SOLUTION INTRAVENOUS at 08:49

## 2021-11-29 RX ADMIN — ACETAMINOPHEN 650 MG: 325 TABLET, FILM COATED ORAL at 08:07

## 2021-11-29 RX ADMIN — DOXORUBICIN HYDROCHLORIDE 99 MG: 2 INJECTION, SOLUTION INTRAVENOUS at 13:08

## 2021-11-29 RX ADMIN — CYCLOPHOSPHAMIDE 1500 MG: 1 INJECTION, POWDER, FOR SOLUTION INTRAVENOUS; ORAL at 12:36

## 2021-12-08 ENCOUNTER — OFFICE VISIT (OUTPATIENT)
Dept: URGENT CARE | Age: 46
End: 2021-12-08
Payer: COMMERCIAL

## 2021-12-08 VITALS
HEIGHT: 68 IN | TEMPERATURE: 98.8 F | OXYGEN SATURATION: 96 % | WEIGHT: 195 LBS | BODY MASS INDEX: 29.55 KG/M2 | HEART RATE: 96 BPM | RESPIRATION RATE: 20 BRPM

## 2021-12-08 DIAGNOSIS — J06.9 VIRAL URI: Primary | ICD-10-CM

## 2021-12-08 PROCEDURE — 99213 OFFICE O/P EST LOW 20 MIN: CPT

## 2021-12-08 PROCEDURE — U0003 INFECTIOUS AGENT DETECTION BY NUCLEIC ACID (DNA OR RNA); SEVERE ACUTE RESPIRATORY SYNDROME CORONAVIRUS 2 (SARS-COV-2) (CORONAVIRUS DISEASE [COVID-19]), AMPLIFIED PROBE TECHNIQUE, MAKING USE OF HIGH THROUGHPUT TECHNOLOGIES AS DESCRIBED BY CMS-2020-01-R: HCPCS

## 2021-12-08 PROCEDURE — U0005 INFEC AGEN DETEC AMPLI PROBE: HCPCS

## 2021-12-10 ENCOUNTER — TELEPHONE (OUTPATIENT)
Dept: HEMATOLOGY ONCOLOGY | Facility: CLINIC | Age: 46
End: 2021-12-10

## 2021-12-10 DIAGNOSIS — C83.32 DIFFUSE LARGE B-CELL LYMPHOMA OF INTRATHORACIC LYMPH NODES (HCC): ICD-10-CM

## 2021-12-10 DIAGNOSIS — U07.1 COVID: Primary | ICD-10-CM

## 2021-12-10 LAB — SARS-COV-2 RNA RESP QL NAA+PROBE: POSITIVE

## 2021-12-10 RX ORDER — ALBUTEROL SULFATE 90 UG/1
3 AEROSOL, METERED RESPIRATORY (INHALATION) ONCE AS NEEDED
Status: CANCELLED | OUTPATIENT
Start: 2021-12-13

## 2021-12-10 RX ORDER — ONDANSETRON 2 MG/ML
4 INJECTION INTRAMUSCULAR; INTRAVENOUS ONCE AS NEEDED
Status: CANCELLED | OUTPATIENT
Start: 2021-12-13

## 2021-12-10 RX ORDER — ACETAMINOPHEN 325 MG/1
650 TABLET ORAL ONCE AS NEEDED
Status: CANCELLED | OUTPATIENT
Start: 2021-12-13

## 2021-12-10 RX ORDER — SODIUM CHLORIDE 9 MG/ML
20 INJECTION, SOLUTION INTRAVENOUS ONCE
Status: CANCELLED | OUTPATIENT
Start: 2021-12-13

## 2021-12-13 ENCOUNTER — HOSPITAL ENCOUNTER (OUTPATIENT)
Dept: INFUSION CENTER | Facility: HOSPITAL | Age: 46
Discharge: HOME | End: 2021-12-13
Payer: COMMERCIAL

## 2021-12-13 VITALS
DIASTOLIC BLOOD PRESSURE: 81 MMHG | SYSTOLIC BLOOD PRESSURE: 131 MMHG | HEART RATE: 94 BPM | OXYGEN SATURATION: 98 % | RESPIRATION RATE: 18 BRPM | TEMPERATURE: 97.6 F

## 2021-12-13 DIAGNOSIS — U07.1 COVID: ICD-10-CM

## 2021-12-13 DIAGNOSIS — C83.32 DIFFUSE LARGE B-CELL LYMPHOMA OF INTRATHORACIC LYMPH NODES (HCC): Primary | ICD-10-CM

## 2021-12-13 PROCEDURE — M0243 CASIRIVI AND IMDEVI INFUSION: HCPCS | Performed by: EMERGENCY MEDICINE

## 2021-12-13 RX ORDER — ALBUTEROL SULFATE 90 UG/1
3 AEROSOL, METERED RESPIRATORY (INHALATION) ONCE AS NEEDED
Status: CANCELLED | OUTPATIENT
Start: 2021-12-13

## 2021-12-13 RX ORDER — SODIUM CHLORIDE 9 MG/ML
20 INJECTION, SOLUTION INTRAVENOUS ONCE
Status: CANCELLED | OUTPATIENT
Start: 2021-12-13

## 2021-12-13 RX ORDER — ACETAMINOPHEN 325 MG/1
650 TABLET ORAL ONCE AS NEEDED
Status: DISCONTINUED | OUTPATIENT
Start: 2021-12-13 | End: 2021-12-16 | Stop reason: HOSPADM

## 2021-12-13 RX ORDER — ONDANSETRON 2 MG/ML
4 INJECTION INTRAMUSCULAR; INTRAVENOUS ONCE AS NEEDED
Status: CANCELLED | OUTPATIENT
Start: 2021-12-13

## 2021-12-13 RX ORDER — SODIUM CHLORIDE 9 MG/ML
20 INJECTION, SOLUTION INTRAVENOUS ONCE
Status: DISCONTINUED | OUTPATIENT
Start: 2021-12-13 | End: 2021-12-16 | Stop reason: HOSPADM

## 2021-12-13 RX ORDER — ACETAMINOPHEN 325 MG/1
650 TABLET ORAL ONCE AS NEEDED
Status: CANCELLED | OUTPATIENT
Start: 2021-12-13

## 2021-12-13 RX ORDER — ONDANSETRON 2 MG/ML
4 INJECTION INTRAMUSCULAR; INTRAVENOUS ONCE AS NEEDED
Status: DISCONTINUED | OUTPATIENT
Start: 2021-12-13 | End: 2021-12-16 | Stop reason: HOSPADM

## 2021-12-13 RX ORDER — ALBUTEROL SULFATE 90 UG/1
3 AEROSOL, METERED RESPIRATORY (INHALATION) ONCE AS NEEDED
Status: DISCONTINUED | OUTPATIENT
Start: 2021-12-13 | End: 2021-12-16 | Stop reason: HOSPADM

## 2021-12-13 RX ADMIN — CASIRIVIMAB AND IMDEVIMAB 1200 MG COMBINED: 600; 600 INJECTION, SOLUTION, CONCENTRATE INTRAVENOUS at 13:32

## 2021-12-20 ENCOUNTER — TELEPHONE (OUTPATIENT)
Dept: HEMATOLOGY ONCOLOGY | Facility: CLINIC | Age: 46
End: 2021-12-20

## 2021-12-29 ENCOUNTER — APPOINTMENT (OUTPATIENT)
Dept: LAB | Age: 46
End: 2021-12-29
Payer: COMMERCIAL

## 2021-12-29 DIAGNOSIS — C83.32 DIFFUSE LARGE B-CELL LYMPHOMA OF INTRATHORACIC LYMPH NODES (HCC): ICD-10-CM

## 2021-12-29 LAB
ALBUMIN SERPL BCP-MCNC: 4 G/DL (ref 3.5–5)
ALP SERPL-CCNC: 95 U/L (ref 46–116)
ALT SERPL W P-5'-P-CCNC: 37 U/L (ref 12–78)
ANION GAP SERPL CALCULATED.3IONS-SCNC: 6 MMOL/L (ref 4–13)
AST SERPL W P-5'-P-CCNC: 15 U/L (ref 5–45)
BASOPHILS # BLD AUTO: 0.06 THOUSANDS/ΜL (ref 0–0.1)
BASOPHILS NFR BLD AUTO: 1 % (ref 0–1)
BILIRUB SERPL-MCNC: 0.41 MG/DL (ref 0.2–1)
BUN SERPL-MCNC: 12 MG/DL (ref 5–25)
CALCIUM SERPL-MCNC: 9.5 MG/DL (ref 8.3–10.1)
CHLORIDE SERPL-SCNC: 105 MMOL/L (ref 100–108)
CO2 SERPL-SCNC: 26 MMOL/L (ref 21–32)
CREAT SERPL-MCNC: 0.82 MG/DL (ref 0.6–1.3)
EOSINOPHIL # BLD AUTO: 0.08 THOUSAND/ΜL (ref 0–0.61)
EOSINOPHIL NFR BLD AUTO: 1 % (ref 0–6)
ERYTHROCYTE [DISTWIDTH] IN BLOOD BY AUTOMATED COUNT: 18.2 % (ref 11.6–15.1)
GFR SERPL CREATININE-BSD FRML MDRD: 107 ML/MIN/1.73SQ M
GLUCOSE SERPL-MCNC: 82 MG/DL (ref 65–140)
HCT VFR BLD AUTO: 41.5 % (ref 36.5–49.3)
HGB BLD-MCNC: 13.9 G/DL (ref 12–17)
IMM GRANULOCYTES # BLD AUTO: 0.06 THOUSAND/UL (ref 0–0.2)
IMM GRANULOCYTES NFR BLD AUTO: 1 % (ref 0–2)
LYMPHOCYTES # BLD AUTO: 1.09 THOUSANDS/ΜL (ref 0.6–4.47)
LYMPHOCYTES NFR BLD AUTO: 12 % (ref 14–44)
MCH RBC QN AUTO: 30.5 PG (ref 26.8–34.3)
MCHC RBC AUTO-ENTMCNC: 33.5 G/DL (ref 31.4–37.4)
MCV RBC AUTO: 91 FL (ref 82–98)
MONOCYTES # BLD AUTO: 1.17 THOUSAND/ΜL (ref 0.17–1.22)
MONOCYTES NFR BLD AUTO: 13 % (ref 4–12)
NEUTROPHILS # BLD AUTO: 6.51 THOUSANDS/ΜL (ref 1.85–7.62)
NEUTS SEG NFR BLD AUTO: 72 % (ref 43–75)
NRBC BLD AUTO-RTO: 0 /100 WBCS
PLATELET # BLD AUTO: 323 THOUSANDS/UL (ref 149–390)
PMV BLD AUTO: 9.9 FL (ref 8.9–12.7)
POTASSIUM SERPL-SCNC: 3.6 MMOL/L (ref 3.5–5.3)
PROT SERPL-MCNC: 7.4 G/DL (ref 6.4–8.2)
RBC # BLD AUTO: 4.56 MILLION/UL (ref 3.88–5.62)
SODIUM SERPL-SCNC: 137 MMOL/L (ref 136–145)
WBC # BLD AUTO: 8.97 THOUSAND/UL (ref 4.31–10.16)

## 2021-12-29 PROCEDURE — 85025 COMPLETE CBC W/AUTO DIFF WBC: CPT

## 2021-12-29 PROCEDURE — 36415 COLL VENOUS BLD VENIPUNCTURE: CPT

## 2021-12-29 PROCEDURE — 80053 COMPREHEN METABOLIC PANEL: CPT

## 2022-01-03 ENCOUNTER — OFFICE VISIT (OUTPATIENT)
Dept: HEMATOLOGY ONCOLOGY | Facility: CLINIC | Age: 47
End: 2022-01-03
Payer: COMMERCIAL

## 2022-01-03 VITALS
DIASTOLIC BLOOD PRESSURE: 82 MMHG | SYSTOLIC BLOOD PRESSURE: 130 MMHG | HEIGHT: 68 IN | HEART RATE: 84 BPM | TEMPERATURE: 96.7 F | OXYGEN SATURATION: 97 % | BODY MASS INDEX: 30.46 KG/M2 | RESPIRATION RATE: 17 BRPM | WEIGHT: 201 LBS

## 2022-01-03 DIAGNOSIS — D70.1 CHEMOTHERAPY INDUCED NEUTROPENIA (HCC): ICD-10-CM

## 2022-01-03 DIAGNOSIS — C83.32 DIFFUSE LARGE B-CELL LYMPHOMA OF INTRATHORACIC LYMPH NODES (HCC): Primary | ICD-10-CM

## 2022-01-03 DIAGNOSIS — T45.1X5A CHEMOTHERAPY INDUCED NEUTROPENIA (HCC): ICD-10-CM

## 2022-01-03 PROCEDURE — 99214 OFFICE O/P EST MOD 30 MIN: CPT | Performed by: INTERNAL MEDICINE

## 2022-01-03 NOTE — PROGRESS NOTES
Hematology Outpatient Follow - Up Note  Naseem Pierre 42 y o  male MRN: @ Encounter: [id number]        Date:  1/3/2022        Assessment/ Plan:    44-year-old  male with past medical history of hypertension, he smokes half pack of cigarettes daily for many years, presented with constitutional symptoms such as low-grade fever, night sweats, weight loss over the past few weeks, chest x-ray showed wide mediastinum, CT scan at [facility] on 09/2021 showed 11 x 6 1 x 6 7 cm anterior mediastinal mass, multiple enlarged mediastinal lymph nodes are present as well, bilateral supraclavicular lymphadenopathy as well, no evidence of axillary lymphadenopathy      Differential diagnosis includes lymphoproliferative disorder, sarcoidosis, germ cell tumor, thymoma      excisional biopsy of the right supraclavicular area showed diffuse large B-cell lymphoma, co expressing c-myc, BCL2, nongerminal subtype, Ki-67> 75%, highly positive for CD 20 scattered positivity for CD 30      Positive for EB expressing protein consistent with diffuse large B-cell lymphoma expressing EBV of not otherwise specified type      PET scan showed extensive uptake in the supraclavicular, cervical as well as mediastinal area, 1 single lymph node in the gastrohepatic area consistent with stage IIIA disease     Molecular test came back negative for BCL 2, BCL 6, c-myc     He received R-CHOP cycle 1  On 09/27/2021     After cycle 3  CT scan of the chest showed significant reduction in the mediastinal bulky lymph node/ mass by at least 50%    Cycle 5  Was interrupted because of COVID-19 infection he received antibodies in December 2021     Proceed with cycle 5  After cycle 6  In 3 weeks will do PET scan to assess response    Chemotherapy-induced neutropenia pegfilgrastim        Labs and imaging studies are reviewed by ordering provider once results are available   If there are findings that need immediate attention, you will be contacted when results available  Discussing results and the implication on your healthcare is best discussed in person at your follow-up visit  HPI:    82-year-old  male with history of hypertension, anxiety, had been feeling with intermittent pleurisy for the past few months, later on he had shortness of breath when he goes up stairs, in the past 1-2 weeks CEA had low-grade fever at night, night sweats, he lost about 5-10 lb     Chest x-ray on 09/10/2021  Widening of the mediastinum     CT scan of the chest showed large anterior mediastinal mass measuring 11 4 x 6 1 x 6 7 cm with multiple enlarged mediastinal lymph nodes for example a prevascular lymph node measuring 2 4 x 1 6 cm, right paratracheal lymph node measuring 1 4 x 2 8 cm, bilateral supraclavicular lymphadenopathy, subcentimeter right hilar lymphadenopathy no axillary lymph node enlargement      Denies any headache blurred vision diplopia angina abdominal pain dysuria hematuria melena hematochezia skin rash pruritus      He smokes half pack of cigarettes daily for many years, drinks alcohol occasionally     Status post excision biopsy of the right supraclavicular area           Final Diagnosis   A  Lymph node, right neck, excision:  -  EBV-positive diffuse large B-cell lymphoma, not otherwise specified (see note)        Electronically signed by Kelley Shaw MD on 9/20/2021 at  3:04 PM   Note     The sample shows lymph node parenchyma which is almost entirely replaced by a diffuse population of large highly atypical cells   The atypical cells show significant pleomorphism, prominent nucleoli, mitotic activity, and zonal necrosis   Immunohistochemical stains performed with appropriate controls show background CD3/CD5 positive T-cells with abundant large CD 20 positive B-cells which coexpress CD30, MUM1, partial BCL6, BCL2, c-Myc, and SKYLA HANNAH with high Ki-67 proliferative activity (>75%); the B-cells are negative for CD10, CD5, and cyclin D1   A portion of the sample was submitted for flow cytometric analysis ([id number]; VERÓNICA Fonseca , Ph D ) which shows a small population of large B-cells with lambda expression which are CD5 and CD10 negative      Overall, the combination of findings is diagnostic of large B-cell lymphoma with high-grade features (coexpression of c-Myc and BCL2) and non-germinal center phenotype and SKYLA expression consistent with EBV positive diffuse large B-cell lymphoma, not otherwise specified   Additional genetic analysis for pertinent mutations is pending and will be reported as an addendum       Negative for c-myc, BCL 2, BCL 6 by fish     PET scan showed lymphadenopathy extensive in the neck, chest compatible with lymphoma score of 5, the single avid gastrohepatic lymph node  Evaluated by Dr Vaibhav Flores, he agreed on the R-CHOP chemotherapy for at least 6 doses and then PET scan thereafter to assess response      Interval History:    COVID-19 infection he received the antibodies in December 2021    Previous Treatment:         Test Results:    Imaging: No results found  Labs:   Lab Results   Component Value Date    WBC 8 97 12/29/2021    HGB 13 9 12/29/2021    HCT 41 5 12/29/2021    MCV 91 12/29/2021     12/29/2021     Lab Results   Component Value Date    K 3 6 12/29/2021     12/29/2021    CO2 26 12/29/2021    BUN 12 12/29/2021    CREATININE 0 82 12/29/2021    GLUF 122 (H) 11/24/2021    CALCIUM 9 5 12/29/2021    CORRECTEDCA 10 4 (H) 10/14/2021    AST 15 12/29/2021    ALT 37 12/29/2021    ALKPHOS 95 12/29/2021    EGFR 107 12/29/2021       No results found for: IRON, TIBC, FERRITIN    No results found for: [id number]      ROS: Review of Systems   Constitutional: Negative  Negative for appetite change, chills, diaphoresis, fatigue, fever and unexpected weight change  HENT:   Negative for hearing loss, lump/mass, mouth sores, nosebleeds, sore throat, trouble swallowing and voice change      Eyes: Negative  Negative for eye problems and icterus  Respiratory: Negative  Negative for chest tightness, cough, hemoptysis and shortness of breath  Cardiovascular: Negative for chest pain and leg swelling  Gastrointestinal: Negative for abdominal distention, abdominal pain, blood in stool, constipation, diarrhea and nausea  Endocrine: Negative  Genitourinary: Negative for dysuria, frequency, hematuria and pelvic pain  Musculoskeletal: Negative  Negative for arthralgias, back pain, flank pain, gait problem, myalgias and neck stiffness  Skin: Negative for itching and rash  Neurological: Negative for dizziness, gait problem, headaches, light-headedness, numbness and speech difficulty  Hematological: Negative for adenopathy  Does not bruise/bleed easily  Psychiatric/Behavioral: Negative for confusion, decreased concentration, depression and sleep disturbance  The patient is not nervous/anxious  Current Medications: Reviewed  Allergies: Reviewed  PMH/FH/SH:  Reviewed      Physical Exam:    Body surface area is 2 05 meters squared  Wt Readings from Last 3 Encounters:   01/03/22 91 2 kg (201 lb)   12/08/21 88 5 kg (195 lb)   11/29/21 89 7 kg (197 lb 12 8 oz)        Temp Readings from Last 3 Encounters:   01/03/22 (!) 96 7 °F (35 9 °C)   12/13/21 97 6 °F (36 4 °C) (Tympanic)   12/08/21 98 8 °F (37 1 °C) (Oral)        BP Readings from Last 3 Encounters:   01/03/22 130/82   12/13/21 131/81   11/29/21 139/86         Pulse Readings from Last 3 Encounters:   01/03/22 84   12/13/21 94   12/08/21 96        Physical Exam  Vitals reviewed  Constitutional:       General: He is not in acute distress  Appearance: He is well-developed  He is not diaphoretic  HENT:      Head: Normocephalic and atraumatic  Eyes:      Conjunctiva/sclera: Conjunctivae normal    Neck:      Trachea: No tracheal deviation  Cardiovascular:      Rate and Rhythm: Normal rate and regular rhythm  Heart sounds:  No murmur heard  No friction rub  No gallop  Pulmonary:      Effort: Pulmonary effort is normal  No respiratory distress  Breath sounds: Normal breath sounds  No wheezing or rales  Chest:      Chest wall: No tenderness  Abdominal:      General: There is no distension  Palpations: Abdomen is soft  Tenderness: There is no abdominal tenderness  Musculoskeletal:      Cervical back: Normal range of motion and neck supple  Right lower leg: No edema  Left lower leg: No edema  Lymphadenopathy:      Cervical: No cervical adenopathy  Skin:     General: Skin is warm and dry  Coloration: Skin is not pale  Findings: No erythema  Neurological:      Mental Status: He is alert and oriented to person, place, and time  Psychiatric:         Behavior: Behavior normal          Thought Content: Thought content normal          Judgment: Judgment normal          ECO  Goals and Barriers:  Current Goal: Minimize effects of disease  Barriers: None  Patient's Capacity to Self Care:  Patient is able to self care      Code Status: [unfilled]

## 2022-01-06 ENCOUNTER — HOSPITAL ENCOUNTER (OUTPATIENT)
Dept: INFUSION CENTER | Facility: HOSPITAL | Age: 47
Discharge: HOME | End: 2022-01-06
Payer: COMMERCIAL

## 2022-01-06 VITALS
WEIGHT: 198.85 LBS | BODY MASS INDEX: 30.14 KG/M2 | DIASTOLIC BLOOD PRESSURE: 98 MMHG | TEMPERATURE: 97.2 F | HEART RATE: 73 BPM | HEIGHT: 68 IN | RESPIRATION RATE: 16 BRPM | SYSTOLIC BLOOD PRESSURE: 145 MMHG

## 2022-01-06 DIAGNOSIS — C83.32 DIFFUSE LARGE B-CELL LYMPHOMA OF INTRATHORACIC LYMPH NODES (HCC): Primary | ICD-10-CM

## 2022-01-06 PROCEDURE — 96417 CHEMO IV INFUS EACH ADDL SEQ: CPT

## 2022-01-06 PROCEDURE — 96367 TX/PROPH/DG ADDL SEQ IV INF: CPT

## 2022-01-06 PROCEDURE — 96377 APPLICATON ON-BODY INJECTOR: CPT

## 2022-01-06 PROCEDURE — 96411 CHEMO IV PUSH ADDL DRUG: CPT

## 2022-01-06 PROCEDURE — 96415 CHEMO IV INFUSION ADDL HR: CPT

## 2022-01-06 PROCEDURE — 96413 CHEMO IV INFUSION 1 HR: CPT

## 2022-01-06 RX ORDER — ACETAMINOPHEN 325 MG/1
650 TABLET ORAL ONCE
Status: COMPLETED | OUTPATIENT
Start: 2022-01-06 | End: 2022-01-06

## 2022-01-06 RX ORDER — SODIUM CHLORIDE 9 MG/ML
20 INJECTION, SOLUTION INTRAVENOUS ONCE
Status: COMPLETED | OUTPATIENT
Start: 2022-01-06 | End: 2022-01-06

## 2022-01-06 RX ORDER — DOXORUBICIN HYDROCHLORIDE 2 MG/ML
100 INJECTION, SOLUTION INTRAVENOUS ONCE
Status: COMPLETED | OUTPATIENT
Start: 2022-01-06 | End: 2022-01-06

## 2022-01-06 RX ADMIN — PEGFILGRASTIM 6 MG: KIT SUBCUTANEOUS at 13:21

## 2022-01-06 RX ADMIN — ONDANSETRON: 2 SOLUTION INTRAMUSCULAR; INTRAVENOUS at 08:43

## 2022-01-06 RX ADMIN — ACETAMINOPHEN 650 MG: 325 TABLET, FILM COATED ORAL at 08:20

## 2022-01-06 RX ADMIN — FOSAPREPITANT DIMEGLUMINE 150 MG: 150 INJECTION, POWDER, LYOPHILIZED, FOR SOLUTION INTRAVENOUS at 09:11

## 2022-01-06 RX ADMIN — SODIUM CHLORIDE 20 ML/HR: 0.9 INJECTION, SOLUTION INTRAVENOUS at 08:17

## 2022-01-06 RX ADMIN — DIPHENHYDRAMINE HYDROCHLORIDE 25 MG: 50 INJECTION, SOLUTION INTRAMUSCULAR; INTRAVENOUS at 08:18

## 2022-01-06 RX ADMIN — VINCRISTINE SULFATE 2 MG: 1 INJECTION, SOLUTION INTRAVENOUS at 13:20

## 2022-01-06 RX ADMIN — DOXORUBICIN HYDROCHLORIDE 100 MG: 2 INJECTION, SOLUTION INTRAVENOUS at 13:09

## 2022-01-06 RX ADMIN — RITUXIMAB 738.8 MG: 10 INJECTION, SOLUTION INTRAVENOUS at 09:45

## 2022-01-06 RX ADMIN — CYCLOPHOSPHAMIDE 1500 MG: 1 INJECTION, POWDER, FOR SOLUTION INTRAVENOUS; ORAL at 12:30

## 2022-01-06 NOTE — PROGRESS NOTES
Chemo infused without incident, pt tolerated well  Confirmed next tx at [facility]   Pt declined avs

## 2022-01-27 ENCOUNTER — APPOINTMENT (OUTPATIENT)
Dept: LAB | Age: 47
End: 2022-01-27
Payer: COMMERCIAL

## 2022-01-27 DIAGNOSIS — C83.32 DIFFUSE LARGE B-CELL LYMPHOMA OF INTRATHORACIC LYMPH NODES (HCC): ICD-10-CM

## 2022-01-27 LAB
ALBUMIN SERPL BCP-MCNC: 3.8 G/DL (ref 3.5–5)
ALP SERPL-CCNC: 93 U/L (ref 46–116)
ALT SERPL W P-5'-P-CCNC: 32 U/L (ref 12–78)
ANION GAP SERPL CALCULATED.3IONS-SCNC: 3 MMOL/L (ref 4–13)
AST SERPL W P-5'-P-CCNC: 12 U/L (ref 5–45)
BASOPHILS # BLD AUTO: 0.09 THOUSANDS/ΜL (ref 0–0.1)
BASOPHILS NFR BLD AUTO: 2 % (ref 0–1)
BILIRUB SERPL-MCNC: 0.4 MG/DL (ref 0.2–1)
BUN SERPL-MCNC: 14 MG/DL (ref 5–25)
CALCIUM SERPL-MCNC: 9.3 MG/DL (ref 8.3–10.1)
CHLORIDE SERPL-SCNC: 105 MMOL/L (ref 100–108)
CO2 SERPL-SCNC: 28 MMOL/L (ref 21–32)
CREAT SERPL-MCNC: 0.74 MG/DL (ref 0.6–1.3)
EOSINOPHIL # BLD AUTO: 0.03 THOUSAND/ΜL (ref 0–0.61)
EOSINOPHIL NFR BLD AUTO: 1 % (ref 0–6)
ERYTHROCYTE [DISTWIDTH] IN BLOOD BY AUTOMATED COUNT: 13.8 % (ref 11.6–15.1)
GFR SERPL CREATININE-BSD FRML MDRD: 112 ML/MIN/1.73SQ M
GLUCOSE P FAST SERPL-MCNC: 127 MG/DL (ref 65–99)
HCT VFR BLD AUTO: 41.1 % (ref 36.5–49.3)
HGB BLD-MCNC: 13.7 G/DL (ref 12–17)
IMM GRANULOCYTES # BLD AUTO: 0.05 THOUSAND/UL (ref 0–0.2)
IMM GRANULOCYTES NFR BLD AUTO: 1 % (ref 0–2)
LYMPHOCYTES # BLD AUTO: 0.63 THOUSANDS/ΜL (ref 0.6–4.47)
LYMPHOCYTES NFR BLD AUTO: 11 % (ref 14–44)
MCH RBC QN AUTO: 30.6 PG (ref 26.8–34.3)
MCHC RBC AUTO-ENTMCNC: 33.3 G/DL (ref 31.4–37.4)
MCV RBC AUTO: 92 FL (ref 82–98)
MONOCYTES # BLD AUTO: 0.81 THOUSAND/ΜL (ref 0.17–1.22)
MONOCYTES NFR BLD AUTO: 14 % (ref 4–12)
NEUTROPHILS # BLD AUTO: 4.38 THOUSANDS/ΜL (ref 1.85–7.62)
NEUTS SEG NFR BLD AUTO: 71 % (ref 43–75)
NRBC BLD AUTO-RTO: 0 /100 WBCS
PLATELET # BLD AUTO: 320 THOUSANDS/UL (ref 149–390)
PMV BLD AUTO: 9.2 FL (ref 8.9–12.7)
POTASSIUM SERPL-SCNC: 3.9 MMOL/L (ref 3.5–5.3)
PROT SERPL-MCNC: 7.2 G/DL (ref 6.4–8.2)
RBC # BLD AUTO: 4.48 MILLION/UL (ref 3.88–5.62)
SODIUM SERPL-SCNC: 136 MMOL/L (ref 136–145)
WBC # BLD AUTO: 5.99 THOUSAND/UL (ref 4.31–10.16)

## 2022-01-27 PROCEDURE — 80053 COMPREHEN METABOLIC PANEL: CPT

## 2022-01-27 PROCEDURE — 85025 COMPLETE CBC W/AUTO DIFF WBC: CPT

## 2022-01-27 PROCEDURE — 36415 COLL VENOUS BLD VENIPUNCTURE: CPT

## 2022-01-31 ENCOUNTER — HOSPITAL ENCOUNTER (OUTPATIENT)
Dept: INFUSION CENTER | Facility: CLINIC | Age: 47
Discharge: HOME | End: 2022-01-31
Payer: COMMERCIAL

## 2022-01-31 VITALS — BODY MASS INDEX: 30.31 KG/M2 | WEIGHT: 200 LBS | HEIGHT: 68 IN

## 2022-01-31 DIAGNOSIS — C83.32 DIFFUSE LARGE B-CELL LYMPHOMA OF INTRATHORACIC LYMPH NODES (HCC): Primary | ICD-10-CM

## 2022-01-31 DIAGNOSIS — D70.1 CHEMOTHERAPY INDUCED NEUTROPENIA (HCC): ICD-10-CM

## 2022-01-31 DIAGNOSIS — T45.1X5A CHEMOTHERAPY INDUCED NEUTROPENIA (HCC): ICD-10-CM

## 2022-01-31 PROCEDURE — 96377 APPLICATON ON-BODY INJECTOR: CPT

## 2022-01-31 PROCEDURE — 96367 TX/PROPH/DG ADDL SEQ IV INF: CPT

## 2022-01-31 PROCEDURE — 96417 CHEMO IV INFUS EACH ADDL SEQ: CPT

## 2022-01-31 PROCEDURE — 96411 CHEMO IV PUSH ADDL DRUG: CPT

## 2022-01-31 PROCEDURE — 96415 CHEMO IV INFUSION ADDL HR: CPT

## 2022-01-31 PROCEDURE — 96413 CHEMO IV INFUSION 1 HR: CPT

## 2022-01-31 RX ORDER — SODIUM CHLORIDE 9 MG/ML
20 INJECTION, SOLUTION INTRAVENOUS ONCE
Status: COMPLETED | OUTPATIENT
Start: 2022-01-31 | End: 2022-01-31

## 2022-01-31 RX ORDER — DOXORUBICIN HYDROCHLORIDE 2 MG/ML
99 INJECTION, SOLUTION INTRAVENOUS ONCE
Status: COMPLETED | OUTPATIENT
Start: 2022-01-31 | End: 2022-01-31

## 2022-01-31 RX ORDER — ACETAMINOPHEN 325 MG/1
650 TABLET ORAL ONCE
Status: COMPLETED | OUTPATIENT
Start: 2022-01-31 | End: 2022-01-31

## 2022-01-31 RX ADMIN — VINCRISTINE SULFATE 2 MG: 1 INJECTION, SOLUTION INTRAVENOUS at 13:14

## 2022-01-31 RX ADMIN — CYCLOPHOSPHAMIDE 1500 MG: 1 INJECTION, POWDER, FOR SOLUTION INTRAVENOUS; ORAL at 12:26

## 2022-01-31 RX ADMIN — ACETAMINOPHEN 650 MG: 325 TABLET, FILM COATED ORAL at 08:13

## 2022-01-31 RX ADMIN — PEGFILGRASTIM 6 MG: KIT SUBCUTANEOUS at 13:50

## 2022-01-31 RX ADMIN — FOSAPREPITANT 150 MG: 150 INJECTION, POWDER, LYOPHILIZED, FOR SOLUTION INTRAVENOUS at 11:49

## 2022-01-31 RX ADMIN — DEXAMETHASONE SODIUM PHOSPHATE: 10 INJECTION, SOLUTION INTRAMUSCULAR; INTRAVENOUS at 11:28

## 2022-01-31 RX ADMIN — SODIUM CHLORIDE 20 ML/HR: 0.9 INJECTION, SOLUTION INTRAVENOUS at 07:45

## 2022-01-31 RX ADMIN — RITUXIMAB 738.8 MG: 10 INJECTION, SOLUTION INTRAVENOUS at 08:46

## 2022-01-31 RX ADMIN — DOXORUBICIN HYDROCHLORIDE 99 MG: 2 INJECTION, SOLUTION INTRAVENOUS at 12:58

## 2022-01-31 RX ADMIN — DIPHENHYDRAMINE HYDROCHLORIDE 25 MG: 50 INJECTION, SOLUTION INTRAMUSCULAR; INTRAVENOUS at 08:13

## 2022-01-31 NOTE — PROGRESS NOTES
Pt tolerated treatment well  Pt declined AVS  Pt finished with chemo at this point; aware to schedule port flushes on his way out

## 2022-01-31 NOTE — PLAN OF CARE
Problem: Knowledge Deficit  Goal: Patient/family/caregiver demonstrates understanding of disease process, treatment plan, medications, and discharge instructions  Description: Complete learning assessment and assess knowledge base  Interventions:  - Provide teaching at level of understanding  - Provide teaching via preferred learning methods  Outcome: Progressing     Problem: Knowledge Deficit  Goal: Patient/family/caregiver demonstrates understanding of disease process, treatment plan, medications, and discharge instructions  Description: Complete learning assessment and assess knowledge base    Interventions:  - Provide teaching at level of understanding  - Provide teaching via preferred learning methods  Outcome: Progressing

## 2022-01-31 NOTE — PROGRESS NOTES
Pt here for HOP  Vitals stable; labs within parameters for treatment  Callbell within reach; will continue to monitor

## 2022-02-01 DIAGNOSIS — C83.32 DIFFUSE LARGE B-CELL LYMPHOMA OF INTRATHORACIC LYMPH NODES (HCC): Primary | ICD-10-CM

## 2022-02-15 ENCOUNTER — TELEPHONE (OUTPATIENT)
Dept: HEMATOLOGY ONCOLOGY | Facility: CLINIC | Age: 47
End: 2022-02-15

## 2022-02-15 NOTE — TELEPHONE ENCOUNTER
Left voicemail for pt requesting 2/25 appt be moved to 2/24 at 1120 [facility]  Requested call back if this doesn't work for him

## 2022-02-18 DIAGNOSIS — C83.32 DIFFUSE LARGE B-CELL LYMPHOMA OF INTRATHORACIC LYMPH NODES (HCC): Primary | ICD-10-CM

## 2022-02-21 ENCOUNTER — HOSPITAL ENCOUNTER (OUTPATIENT)
Dept: RADIOLOGY | Age: 47
Discharge: HOME | End: 2022-02-21
Payer: COMMERCIAL

## 2022-02-21 DIAGNOSIS — C83.32 DIFFUSE LARGE B-CELL LYMPHOMA OF INTRATHORACIC LYMPH NODES (HCC): ICD-10-CM

## 2022-02-21 LAB — GLUCOSE SERPL-MCNC: 128 MG/DL (ref 65–140)

## 2022-02-21 PROCEDURE — 78815 PET IMAGE W/CT SKULL-THIGH: CPT

## 2022-02-21 PROCEDURE — 82948 REAGENT STRIP/BLOOD GLUCOSE: CPT

## 2022-02-21 PROCEDURE — G1004 CDSM NDSC: HCPCS

## 2022-02-21 PROCEDURE — A9552 F18 FDG: HCPCS

## 2022-02-23 ENCOUNTER — APPOINTMENT (OUTPATIENT)
Dept: LAB | Age: 47
End: 2022-02-23
Payer: COMMERCIAL

## 2022-02-23 DIAGNOSIS — C83.32 DIFFUSE LARGE B-CELL LYMPHOMA OF INTRATHORACIC LYMPH NODES (HCC): ICD-10-CM

## 2022-02-23 LAB
ALBUMIN SERPL BCP-MCNC: 3.8 G/DL (ref 3.5–5)
ALP SERPL-CCNC: 94 U/L (ref 46–116)
ALT SERPL W P-5'-P-CCNC: 39 U/L (ref 12–78)
ANION GAP SERPL CALCULATED.3IONS-SCNC: 5 MMOL/L (ref 4–13)
AST SERPL W P-5'-P-CCNC: 15 U/L (ref 5–45)
BASOPHILS # BLD AUTO: 0.07 THOUSANDS/ΜL (ref 0–0.1)
BASOPHILS NFR BLD AUTO: 1 % (ref 0–1)
BILIRUB SERPL-MCNC: 0.38 MG/DL (ref 0.2–1)
BUN SERPL-MCNC: 17 MG/DL (ref 5–25)
CALCIUM SERPL-MCNC: 9.8 MG/DL (ref 8.3–10.1)
CHLORIDE SERPL-SCNC: 104 MMOL/L (ref 100–108)
CO2 SERPL-SCNC: 28 MMOL/L (ref 21–32)
CREAT SERPL-MCNC: 0.78 MG/DL (ref 0.6–1.3)
EOSINOPHIL # BLD AUTO: 0.04 THOUSAND/ΜL (ref 0–0.61)
EOSINOPHIL NFR BLD AUTO: 1 % (ref 0–6)
ERYTHROCYTE [DISTWIDTH] IN BLOOD BY AUTOMATED COUNT: 13.7 % (ref 11.6–15.1)
GFR SERPL CREATININE-BSD FRML MDRD: 109 ML/MIN/1.73SQ M
GLUCOSE P FAST SERPL-MCNC: 134 MG/DL (ref 65–99)
HCT VFR BLD AUTO: 43.5 % (ref 36.5–49.3)
HGB BLD-MCNC: 14.7 G/DL (ref 12–17)
IMM GRANULOCYTES # BLD AUTO: 0.05 THOUSAND/UL (ref 0–0.2)
IMM GRANULOCYTES NFR BLD AUTO: 1 % (ref 0–2)
LYMPHOCYTES # BLD AUTO: 0.66 THOUSANDS/ΜL (ref 0.6–4.47)
LYMPHOCYTES NFR BLD AUTO: 10 % (ref 14–44)
MCH RBC QN AUTO: 30.7 PG (ref 26.8–34.3)
MCHC RBC AUTO-ENTMCNC: 33.8 G/DL (ref 31.4–37.4)
MCV RBC AUTO: 91 FL (ref 82–98)
MONOCYTES # BLD AUTO: 1 THOUSAND/ΜL (ref 0.17–1.22)
MONOCYTES NFR BLD AUTO: 15 % (ref 4–12)
NEUTROPHILS # BLD AUTO: 4.75 THOUSANDS/ΜL (ref 1.85–7.62)
NEUTS SEG NFR BLD AUTO: 72 % (ref 43–75)
NRBC BLD AUTO-RTO: 0 /100 WBCS
PLATELET # BLD AUTO: 317 THOUSANDS/UL (ref 149–390)
PMV BLD AUTO: 9.8 FL (ref 8.9–12.7)
POTASSIUM SERPL-SCNC: 3.9 MMOL/L (ref 3.5–5.3)
PROT SERPL-MCNC: 7.4 G/DL (ref 6.4–8.2)
RBC # BLD AUTO: 4.79 MILLION/UL (ref 3.88–5.62)
SODIUM SERPL-SCNC: 137 MMOL/L (ref 136–145)
WBC # BLD AUTO: 6.57 THOUSAND/UL (ref 4.31–10.16)

## 2022-02-23 PROCEDURE — 85025 COMPLETE CBC W/AUTO DIFF WBC: CPT

## 2022-02-23 PROCEDURE — 36415 COLL VENOUS BLD VENIPUNCTURE: CPT

## 2022-02-23 PROCEDURE — 80053 COMPREHEN METABOLIC PANEL: CPT

## 2022-02-24 ENCOUNTER — OFFICE VISIT (OUTPATIENT)
Dept: HEMATOLOGY ONCOLOGY | Facility: CLINIC | Age: 47
End: 2022-02-24
Payer: COMMERCIAL

## 2022-02-24 VITALS
WEIGHT: 204.6 LBS | DIASTOLIC BLOOD PRESSURE: 82 MMHG | OXYGEN SATURATION: 98 % | HEART RATE: 78 BPM | BODY MASS INDEX: 31.01 KG/M2 | HEIGHT: 68 IN | RESPIRATION RATE: 16 BRPM | TEMPERATURE: 96 F | SYSTOLIC BLOOD PRESSURE: 140 MMHG

## 2022-02-24 DIAGNOSIS — C83.32 DIFFUSE LARGE B-CELL LYMPHOMA OF INTRATHORACIC LYMPH NODES (HCC): Primary | ICD-10-CM

## 2022-02-24 PROCEDURE — 99214 OFFICE O/P EST MOD 30 MIN: CPT | Performed by: INTERNAL MEDICINE

## 2022-02-24 NOTE — PROGRESS NOTES
Hematology Outpatient Follow - Up Note  Cristian Cuevas 42 y o  male MRN: @ Encounter: [id number]        Date:  2/24/2022        Assessment/ Plan:    53-year-old  male with past medical history of hypertension, he smokes half pack of cigarettes daily for many years, presented with constitutional symptoms such as low-grade fever, night sweats, weight loss over the past few weeks, chest x-ray showed wide mediastinum, CT scan at [facility] on 09/2021 showed 11 x 6 1 x 6 7 cm anterior mediastinal mass, multiple enlarged mediastinal lymph nodes are present as well, bilateral supraclavicular lymphadenopathy as well, no evidence of axillary lymphadenopathy      Differential diagnosis includes lymphoproliferative disorder, sarcoidosis, germ cell tumor, thymoma      excisional biopsy of the right supraclavicular area showed diffuse large B-cell lymphoma, co expressing c-myc, BCL2, nongerminal subtype, Ki-67> 75%, highly positive for CD 20 scattered positivity for CD 30      Positive for EB expressing protein consistent with diffuse large B-cell lymphoma expressing EBV of not otherwise specified type      PET scan showed extensive uptake in the supraclavicular, cervical as well as mediastinal area, 1 single lymph node in the gastrohepatic area consistent with stage IIIA disease     Molecular test came back negative for BCL 2, BCL 6, c-myc     He received R-CHOP cycle 1  On 09/27/2021     After cycle 3  CT scan of the chest showed significant reduction in the mediastinal bulky lymph node/ mass by at least 50%     Cycle 5  Was interrupted because of COVID-19 infection he received antibodies in December 2021     Cycle 6    In January 2022, PET scan in February 2022 showed significant response, he might have residual disease behind the sternum however Deauville score of 2 (background 2 9) I suggest repeat PET scan in 3 months with CBC, CMP, LDH     The other option radiation therapy to that area    He will meet with Dr Lu Smith and imaging studies are reviewed by ordering provider once results are available  If there are findings that need immediate attention, you will be contacted when results available  Discussing results and the implication on your healthcare is best discussed in person at your follow-up visit  HPI:    82-year-old  male with history of hypertension, anxiety, had been feeling with intermittent pleurisy for the past few months, later on he had shortness of breath when he goes up stairs, in the past 1-2 weeks CEA had low-grade fever at night, night sweats, he lost about 5-10 lb     Chest x-ray on 09/10/2021  Widening of the mediastinum     CT scan of the chest showed large anterior mediastinal mass measuring 11 4 x 6 1 x 6 7 cm with multiple enlarged mediastinal lymph nodes for example a prevascular lymph node measuring 2 4 x 1 6 cm, right paratracheal lymph node measuring 1 4 x 2 8 cm, bilateral supraclavicular lymphadenopathy, subcentimeter right hilar lymphadenopathy no axillary lymph node enlargement      Denies any headache blurred vision diplopia angina abdominal pain dysuria hematuria melena hematochezia skin rash pruritus      He smokes half pack of cigarettes daily for many years, drinks alcohol occasionally     Status post excision biopsy of the right supraclavicular area           Final Diagnosis   A  Lymph node, right neck, excision:  -  EBV-positive diffuse large B-cell lymphoma, not otherwise specified (see note)        Electronically signed by Lavelle Pickett MD on 9/20/2021 at  3:04 PM   Note     The sample shows lymph node parenchyma which is almost entirely replaced by a diffuse population of large highly atypical cells   The atypical cells show significant pleomorphism, prominent nucleoli, mitotic activity, and zonal necrosis   Immunohistochemical stains performed with appropriate controls show background CD3/CD5 positive T-cells with abundant large CD 20 positive B-cells which coexpress CD30, MUM1, partial BCL6, BCL2, c-Myc, and SKYLA HANNAH with high Ki-67 proliferative activity (>75%); the B-cells are negative for CD10, CD5, and cyclin D1   A portion of the sample was submitted for flow cytometric analysis ([id number]; VERÓNICA Page , Ph D ) which shows a small population of large B-cells with lambda expression which are CD5 and CD10 negative      Overall, the combination of findings is diagnostic of large B-cell lymphoma with high-grade features (coexpression of c-Myc and BCL2) and non-germinal center phenotype and SKYLA expression consistent with EBV positive diffuse large B-cell lymphoma, not otherwise specified   Additional genetic analysis for pertinent mutations is pending and will be reported as an addendum       Negative for c-myc, BCL 2, BCL 6 by fish     PET scan showed lymphadenopathy extensive in the neck, chest compatible with lymphoma score of 5, the single avid gastrohepatic lymph node  Evaluated by Dr Roshni Rodriguez, he agreed on the R-CHOP chemotherapy for at least 6 doses and then PET scan thereafter to assess response     COVID-19 infection he received the antibodies in December 2021    Cycle 6  In January 2022     PET scan in February 2022 showed significant response in whole lymph nodes, he might have residual disease behind the sternum with Deauville score 2 (background 2 9)       Interval History:        Previous Treatment:         Test Results:    Imaging: NM PET CT skull base to mid thigh    Result Date: 2/21/2022  Narrative: PET/CT SCAN INDICATION:  C83 32: Diffuse large b-cell lymphoma, intrathoracic lymph nodes   History of diffuse large B-cell lymphoma, status post right neck excisional biopsy  Reassessment following therapy MODIFIER: PS COMPARISON: 9/22/2021 CELL TYPE:  EBV positive diffuse large B-cell lymphoma TECHNIQUE:   10 72 mCi F-18-FDG administered IV   Multiplanar attenuation corrected and non attenuation corrected PET images were acquired 60 minutes post injection  Contiguous, low dose, axial CT sections were obtained from the skull base through the femurs   Intravenous contrast material was not utilized  This examination, like all CT scans performed in the [facility], was performed utilizing techniques to minimize radiation dose exposure, including the use of iterative reconstruction and automated exposure control  Fasting serum glucose: 128 mg/dl FINDINGS: VISUALIZED BRAIN:   No acute abnormalities are seen  HEAD/NECK:   Significant improvement of previously seen hypermetabolic adenopathy, examples: -Previous left supraclavicular 2 5 cm node with SUV max 11 2 now measures approximately 10 mm image 93 SUV max 2 0 -Prior right supraclavicular node had measured 1 6 cm with SUV max 5 5, no longer seen, no residual glucose activity -Focus of skin activity right posterior upper neck image 54 with induration of the skin and adjacent subcutaneous adipose tissue measuring 8 mm SUV max 3 2  Most likely this is inflammatory in nature, possibly inflamed or infected sebaceous cyst however  correlate clinically  No new hypermetabolic cervical adenopathy  CT images: Right jugular approach [id number] catheter  CHEST:   Significant improvement has occurred with respect to previously seen multifocal adenopathy with examples provided: - Prior study demonstrated 10 5 x 5 0 cm anterior mediastinal mass with SUV max 8 1  This is markedly improved  Curvilinear soft tissue density along the anterior margin of the aorta and pulmonary artery with thickness of 1 5 cm  Residual nodular region further superiorly on image 114 measures 2 8 x 1 7 cm    Overall maximal SUV max at this time is 2 9  -Previous right paratracheal lymph node measured 2 5 x 1 5 cm with SUV max 10 1, now 0 9 x 0 5 cm, SUV max 1 9  -Prior study demonstrated right infrahilar activity SUV max 3 9 with no residual activity today -Prior study demonstrated right internal mammary lymph node with SUV max 4 2 measuring 1 1 x 0 7 cm no longer present  CT images: There is no pleural or pericardial effusion  ABDOMEN:   Previous study demonstrated gastrohepatic lymph node with SUV max of 3 0 measuring 1 cm, no longer visualized  No new glucose avid abnormality in the soft tissues  CT images: Small ventral adipose-containing hernia  No adenopathy, hydronephrosis or bowel obstruction  Mild hepatic steatosis  PELVIS: No FDG avid soft tissue lesions are seen  CT images: Unremarkable  OSSEOUS STRUCTURES: No FDG avid lesions are seen  CT images: Diffusely increased skeletal activity is likely on the basis of interval therapy  This reduces the sensitivity in the detection of focal subtle glucose avid disease although no suspicious findings are detected  Impression: 1  There has been a significant improvement of previously seen hypermetabolic adenopathy  At this time, maximal residual elvia SUV value is 2 9, in the anterior mediastinum  The Deauville score is 2-3 (For reference, liver SUV max is 3 0  Mediastinal blood pool SUV max is 2 8) 2  There are no new glucose avid lesions 3  Diffusely increased skeletal activity likely related to interval therapy 4  Mild hepatic steatosis    Right posterior upper neck/occipital skin/subcutaneous focus of increased activity likely inflammatory although correlate clinically [] Workstation performed: [id number]       Labs:   Lab Results   Component Value Date    WBC 6 57 02/23/2022    HGB 14 7 02/23/2022    HCT 43 5 02/23/2022    MCV 91 02/23/2022     02/23/2022     Lab Results   Component Value Date    K 3 9 02/23/2022     02/23/2022    CO2 28 02/23/2022    BUN 17 02/23/2022    CREATININE 0 78 02/23/2022    GLUF 134 (H) 02/23/2022    CALCIUM 9 8 02/23/2022    CORRECTEDCA 10 4 (H) 10/14/2021    AST 15 02/23/2022    ALT 39 02/23/2022    ALKPHOS 94 02/23/2022    EGFR 109 02/23/2022       No results found for: IRON, TIBC, FERRITIN    No results found for: PVOTHNMI20      ROS: Review of Systems   Constitutional: Negative  Negative for appetite change, chills, diaphoresis, fatigue, fever and unexpected weight change  HENT:   Negative for hearing loss, lump/mass, mouth sores, nosebleeds, sore throat, trouble swallowing and voice change  Eyes: Negative  Negative for eye problems and icterus  Respiratory: Negative  Negative for chest tightness, cough, hemoptysis and shortness of breath  Cardiovascular: Negative for chest pain and leg swelling  Gastrointestinal: Negative for abdominal distention, abdominal pain, blood in stool, constipation, diarrhea and nausea  Endocrine: Negative  Genitourinary: Negative for dysuria, frequency, hematuria and pelvic pain  Musculoskeletal: Negative  Negative for arthralgias, back pain, flank pain, gait problem, myalgias and neck stiffness  Skin: Negative for itching and rash  Neurological: Negative for dizziness, gait problem, headaches, light-headedness, numbness and speech difficulty  Hematological: Negative for adenopathy  Does not bruise/bleed easily  Psychiatric/Behavioral: Negative for confusion, decreased concentration, depression and sleep disturbance  The patient is not nervous/anxious  Current Medications: Reviewed  Allergies: Reviewed  PMH/FH/SH:  Reviewed      Physical Exam:    Body surface area is 2 06 meters squared  Wt Readings from Last 3 Encounters:   02/24/22 92 8 kg (204 lb 9 6 oz)   01/31/22 90 7 kg (200 lb)   01/06/22 90 2 kg (198 lb 13 7 oz)        Temp Readings from Last 3 Encounters:   02/24/22 (!) 96 °F (35 6 °C)   01/06/22 (!) 97 2 °F (36 2 °C) (Temporal)   01/03/22 (!) 96 7 °F (35 9 °C)        BP Readings from Last 3 Encounters:   02/24/22 140/82   01/06/22 145/98   01/03/22 130/82         Pulse Readings from Last 3 Encounters:   02/24/22 78   01/06/22 73   01/03/22 84        Physical Exam  Vitals reviewed     Constitutional:       General: He is not in acute distress  Appearance: He is well-developed  He is not diaphoretic  HENT:      Head: Normocephalic and atraumatic  Eyes:      Conjunctiva/sclera: Conjunctivae normal    Neck:      Trachea: No tracheal deviation  Cardiovascular:      Rate and Rhythm: Normal rate and regular rhythm  Heart sounds: No murmur heard  No friction rub  No gallop  Pulmonary:      Effort: Pulmonary effort is normal  No respiratory distress  Breath sounds: Normal breath sounds  No wheezing or rales  Chest:      Chest wall: No tenderness  Abdominal:      General: There is no distension  Palpations: Abdomen is soft  Tenderness: There is no abdominal tenderness  Musculoskeletal:      Cervical back: Normal range of motion and neck supple  Right lower leg: No edema  Left lower leg: No edema  Lymphadenopathy:      Cervical: No cervical adenopathy  Skin:     General: Skin is warm and dry  Coloration: Skin is not pale  Findings: No erythema  Neurological:      Mental Status: He is alert and oriented to person, place, and time  Psychiatric:         Behavior: Behavior normal          Thought Content: Thought content normal          Judgment: Judgment normal          ECO    Goals and Barriers:  Current Goal: Minimize effects of disease  Barriers: None  Patient's Capacity to Self Care:  Patient is able to self care      Code Status: [unfilled]

## 2022-03-15 ENCOUNTER — HOSPITAL ENCOUNTER (OUTPATIENT)
Dept: INFUSION CENTER | Facility: CLINIC | Age: 47
Discharge: HOME | End: 2022-03-15
Payer: COMMERCIAL

## 2022-03-15 DIAGNOSIS — C83.32 DIFFUSE LARGE B-CELL LYMPHOMA OF INTRATHORACIC LYMPH NODES (HCC): Primary | ICD-10-CM

## 2022-03-15 PROCEDURE — 96523 IRRIG DRUG DELIVERY DEVICE: CPT

## 2022-03-15 NOTE — PROGRESS NOTES
Patient here for port flush  Port flush, blood return noted  Patient aware to have labs drawn prior to appointment with Dr Maria Del Carmen Heck next month but states he will get them drawn at out pt lab  Patient to schedule next port flush  AVS declined

## 2022-03-30 NOTE — H&P (VIEW-ONLY)
Surgical Oncology Consultation    [id number] [facility] Arlys Rahman  [address] [address]     Patient:  Octavio Rico  1975  [id number]    Primary Care provider:  Allegra Leslie MD   [place] [address]    Referring provider:  No referring provider defined for this encounter  Diagnoses and all orders for this visit:    Supraclavicular lymphadenopathy  -     Case request operating room: EXCISIONAL BIOPSY OF RIGHT NECK LYMPH NODE WITH LYMPHOMA PROTOCOL; Standing  -     traMADol (ULTRAM) 50 mg tablet; Take 1 tablet (50 mg total) by mouth every 6 (six) hours as needed for moderate pain  -     Case request operating room: EXCISIONAL BIOPSY OF RIGHT NECK LYMPH NODE WITH LYMPHOMA PROTOCOL    Other orders  -     Incentive spirometry; Standing  -     Insert and maintain IV line; Standing  -     Void On-Call to O R ; Standing  -     Place sequential compression device; Standing  -     ceFAZolin (ANCEF) IVPB (premix in dextrose) 2,000 mg 50 mL  -     Cancel: EKG 12 lead; Future        Chief Complaint   Patient presents with    Lymphadenopathy       No follow-ups on file  Oncology History    No history exists  History of Present Illness  : This is a 44-year-old gentleman with a new diagnosis of likely lymphoma  Briefly, couple weeks ago the patient was having some chest pressure, and underwent a chest x-ray revealing wide mediastinum  This was followed by CT scan, which revealed a large anterior mediastinal mass as well as bilateral supraclavicular lymphadenopathy  He saw Dr Hill motley, his suspected lymphoma diagnosis, and refer the patient to me for an excisional lymph node biopsy  The patient states that he has had significant fatigue over the last couple of weeks  He also has a decreased appetite with mild weight loss to 5 lb    He denies any night sweats, chills, fevers, bone pain, shortness of breath  Review of Systems  Complete ROS Surg Onc:   Constitutional: The ENDORSES recent history of general fatigue, mild recent weight loss, DECREASED appetite  Eyes: No complaints of visual problems, no scleral icterus  ENT: No complaints of ear pain, no hoarseness, no difficulty swallowing,  no tinnitus and no new masses in head, oral cavity, or neck  Cardiovascular: No complaints of chest pain, no palpitations, no ankle edema  Respiratory: No complaints of shortness of breath, no cough  Gastrointestinal: No complaints of jaundice, no bloody stools, no pale stools  Genitourinary: No complaints of dysuria, no hematuria, no nocturia, no frequent urination, no urethral discharge  Musculoskeletal: No complaints of weakness, paralysis, joint stiffness or arthralgias  Integumentary: No complaints of rash, no new lesions  Neurological: No complaints of convulsions, no seizures, no dizziness  Hematologic/Lymphatic: No complaints of easy bruising  Endocrine:  No hot or cold intolerance  No polydipsia, polyphagia, or polyuria  Allergy/immunology:  No environmental allergies  No food allergies  Not immunocompromised        Patient Active Problem List   Diagnosis    Supraclavicular lymphadenopathy     Past Medical History:   Diagnosis Date    Hypertension      Past Surgical History:   Procedure Laterality Date    TOOTH EXTRACTION  02/16/2021     Family History   Problem Relation Age of Onset    Hypertension Family     Cancer Family      Social History     Socioeconomic History    Marital status: /Civil Union     Spouse name: Not on file    Number of children: Not on file    Years of education: Not on file    Highest education level: Not on file   Occupational History    Not on file   Tobacco Use    Smoking status: Current Every Day Smoker    Smokeless tobacco: Never Used   Substance and Sexual Activity    Alcohol use: Yes     Comment: rarely    Drug use: Never    Sexual activity: Not on file   Other Topics Concern    Not on file   Social History Narrative    Not on file     Social Determinants of Health     Financial Resource Strain:     Difficulty of Paying Living Expenses:    Food Insecurity:     Worried About Running Out of Food in the Last Year:     [address] in the Last Year:    Transportation Needs:     Lack of Transportation (Medical):  Lack of Transportation (Non-Medical):    Physical Activity:     Days of Exercise per Week:     Minutes of Exercise per Session:    Stress:     Feeling of Stress :    Social Connections:     Frequency of Communication with Friends and Family:     Frequency of Social Gatherings with Friends and Family:     Attends Zoroastrian Services:     Active Member of Clubs or Organizations:     Attends Club or Organization Meetings:     Marital Status:    Intimate Partner Violence:     Fear of Current or Ex-Partner:     Emotionally Abused:     Physically Abused:     Sexually Abused:        Current Outpatient Medications:     ALPRAZolam (XANAX) 0 5 mg tablet, Take 0 5 mg by mouth daily at bedtime as needed for anxiety, Disp: , Rfl:     bisoprolol-hydrochlorothiazide (ZIAC) 5-6 25 MG per tablet, Take 1 tablet by mouth daily, Disp: , Rfl:     predniSONE 50 mg tablet, Take 1 tablet (50 mg total) by mouth 2 (two) times a day with meals (Patient not taking: Reported on 9/14/2021), Disp: 30 tablet, Rfl: 0    traMADol (ULTRAM) 50 mg tablet, Take 1 tablet (50 mg total) by mouth every 6 (six) hours as needed for moderate pain, Disp: 12 tablet, Rfl: 0  No current facility-administered medications for this visit    No Known Allergies    Vitals:    09/14/21 0855   BP: 118/74   Pulse: 93   Resp: 16   Temp: 98 8 °F (37 1 °C)   SpO2: 98%       Physical Exam   General: Appears well, appears stated age  Skin: Warm, anicteric  HEENT: Normocephalic, atraumatic; sclera aniceteric, mucous membranes moist; cervical nodes without adenopathy  Cardiopulmonary: RRR, Easy WOB, no BLE edema  Abd: Flat and soft, nontender, no masses appreciated, no hepatosplenomegaly  MSK: Symmetric, no cyanosis, no overt weakness  Lymphatic: No cervical, axillary or inguinal lymphadenopathy  Right supraclavicular node palpated at the medial aspect of the clavicle  Neuro: Affect appropriate, no gross motor abnormalities      Labs: Reviewed in EPIC    Imaging  CT soft tissue neck w contrast    Result Date: 9/13/2021  Narrative: CT NECK WITH CONTRAST INDICATION:   R59 0: Localized enlarged lymph nodes  COMPARISON:  None  TECHNIQUE:  Axial, sagittal, and coronal 2D reformatted images were created from the axial source data and submitted for interpretation  Radiation dose length product (DLP) for this visit:  891 mGy-cm   This examination, like all CT scans performed in the [facility], was performed utilizing techniques to minimize radiation dose exposure, including the use of iterative reconstruction and automated exposure control  IV Contrast:  85 mL of iohexol (OMNIPAQUE) IMAGE QUALITY:  Diagnostic  FINDINGS: VISUALIZED BRAIN PARENCHYMA:  No acute intracranial pathology of the visualized brain parenchyma  VISUALIZED ORBITS AND PARANASAL SINUSES:  Unremarkable orbits  Mild mucosal thickening of the maxillary sinuses  NASAL CAVITY AND NASOPHARYNX:  Normal  SUPRAHYOID NECK:  Normal oral cavity, tongue base, tonsillar fossa and epiglottis  INFRAHYOID NECK:  Aryepiglottic folds and piriform sinuses are normal   Normal glottis and subglottic airway  THYROID GLAND:  Unremarkable  PAROTID AND SUBMANDIBULAR GLANDS:  Normal  LYMPH NODES:  Within the neck there are small supraclavicular nodes demonstrating slightly heterogeneous enhancement  The largest of these on the left measures 2 4 x 1 5 cm and the largest on the right measures 2 2 x 1 8 cm  Small posterior jugular chain nodes are identified within the left neck, level 5   Evaluation of the upper mediastinum demonstrates a large heterogeneously enhancing mass within the anterior superior mediastinum, likely a large elvia mass  Multiple additional mediastinal nodes are seen in the anterior mediastinum, paratracheal, prevascular regions as well as suprahilar regions  VASCULAR STRUCTURES:  Normal enhancement of the cervical vasculature  THORACIC INLET:  See above description of mediastinal masses/adenopathy  The upper lung fields are clear  BONY STRUCTURES: No acute osseous abnormality  Impression: There are multiple supraclavicular nodes identified bilaterally measuring up to 2 4 cm in long axis  Additional small posterior jugular chain/level 5 nodes are present  Large mediastinal mass within the anterior superior mediastinum with multiple surrounding slightly heterogeneously enhancing enlarged lymph nodes extending inferiorly beyond the scope of this exam   Differential considerations would include lymphoma as well as other neoplastic etiologies  Prior outside imaging of the chest is not immediately available for comparison  No imaging of the abdomen or pelvis recently  Tissue sampling recommended  Workstation performed: [id number]       I independently reviewed and interpreted the above laboratory and imaging data  Discussion/Summary:    30-year-old gentleman with a diagnosis of an anterior mediastinal mass and significant cervical lymphadenopathy, most likely consistent with a lymphoma  The patient has been referred for an excisional lymph node biopsy  The right supraclavicular node is easily palpable and I will perform an excisional biopsy of this node  Risks 7 explained to the patient and his father today to include infection, bleeding, transient numbness or tingling  All questions answered today we will proceed with excisional lymph node biopsy  Azathioprine Pregnancy And Lactation Text: This medication is Pregnancy Category D and isn't considered safe during pregnancy. It is unknown if this medication is excreted in breast milk.

## 2022-04-22 ENCOUNTER — APPOINTMENT (OUTPATIENT)
Dept: LAB | Age: 47
End: 2022-04-22
Payer: COMMERCIAL

## 2022-04-22 DIAGNOSIS — C83.32 DIFFUSE LARGE B-CELL LYMPHOMA OF INTRATHORACIC LYMPH NODES (HCC): ICD-10-CM

## 2022-04-22 LAB
ALBUMIN SERPL BCP-MCNC: 4.1 G/DL (ref 3.5–5)
ALP SERPL-CCNC: 107 U/L (ref 46–116)
ALT SERPL W P-5'-P-CCNC: 50 U/L (ref 12–78)
ANION GAP SERPL CALCULATED.3IONS-SCNC: 2 MMOL/L (ref 4–13)
AST SERPL W P-5'-P-CCNC: 22 U/L (ref 5–45)
BASOPHILS # BLD AUTO: 0.05 THOUSANDS/ΜL (ref 0–0.1)
BASOPHILS NFR BLD AUTO: 1 % (ref 0–1)
BILIRUB SERPL-MCNC: 0.56 MG/DL (ref 0.2–1)
BUN SERPL-MCNC: 16 MG/DL (ref 5–25)
CALCIUM SERPL-MCNC: 9.2 MG/DL (ref 8.3–10.1)
CHLORIDE SERPL-SCNC: 107 MMOL/L (ref 100–108)
CO2 SERPL-SCNC: 30 MMOL/L (ref 21–32)
CREAT SERPL-MCNC: 0.87 MG/DL (ref 0.6–1.3)
EOSINOPHIL # BLD AUTO: 0.25 THOUSAND/ΜL (ref 0–0.61)
EOSINOPHIL NFR BLD AUTO: 4 % (ref 0–6)
ERYTHROCYTE [DISTWIDTH] IN BLOOD BY AUTOMATED COUNT: 13.1 % (ref 11.6–15.1)
GFR SERPL CREATININE-BSD FRML MDRD: 104 ML/MIN/1.73SQ M
GLUCOSE P FAST SERPL-MCNC: 122 MG/DL (ref 65–99)
HCT VFR BLD AUTO: 47.1 % (ref 36.5–49.3)
HGB BLD-MCNC: 15.9 G/DL (ref 12–17)
IMM GRANULOCYTES # BLD AUTO: 0.02 THOUSAND/UL (ref 0–0.2)
IMM GRANULOCYTES NFR BLD AUTO: 0 % (ref 0–2)
LDH SERPL-CCNC: 205 U/L (ref 81–234)
LYMPHOCYTES # BLD AUTO: 0.87 THOUSANDS/ΜL (ref 0.6–4.47)
LYMPHOCYTES NFR BLD AUTO: 13 % (ref 14–44)
MCH RBC QN AUTO: 29.7 PG (ref 26.8–34.3)
MCHC RBC AUTO-ENTMCNC: 33.8 G/DL (ref 31.4–37.4)
MCV RBC AUTO: 88 FL (ref 82–98)
MONOCYTES # BLD AUTO: 0.75 THOUSAND/ΜL (ref 0.17–1.22)
MONOCYTES NFR BLD AUTO: 11 % (ref 4–12)
NEUTROPHILS # BLD AUTO: 4.83 THOUSANDS/ΜL (ref 1.85–7.62)
NEUTS SEG NFR BLD AUTO: 71 % (ref 43–75)
NRBC BLD AUTO-RTO: 0 /100 WBCS
PLATELET # BLD AUTO: 205 THOUSANDS/UL (ref 149–390)
PMV BLD AUTO: 10.1 FL (ref 8.9–12.7)
POTASSIUM SERPL-SCNC: 4.1 MMOL/L (ref 3.5–5.3)
PROT SERPL-MCNC: 7.3 G/DL (ref 6.4–8.2)
RBC # BLD AUTO: 5.36 MILLION/UL (ref 3.88–5.62)
SODIUM SERPL-SCNC: 139 MMOL/L (ref 136–145)
WBC # BLD AUTO: 6.77 THOUSAND/UL (ref 4.31–10.16)

## 2022-04-22 PROCEDURE — 83615 LACTATE (LD) (LDH) ENZYME: CPT

## 2022-04-22 PROCEDURE — 85025 COMPLETE CBC W/AUTO DIFF WBC: CPT

## 2022-04-22 PROCEDURE — 80053 COMPREHEN METABOLIC PANEL: CPT

## 2022-04-22 PROCEDURE — 36415 COLL VENOUS BLD VENIPUNCTURE: CPT

## 2022-04-25 ENCOUNTER — HOSPITAL ENCOUNTER (OUTPATIENT)
Dept: RADIOLOGY | Age: 47
Discharge: HOME | End: 2022-04-25
Payer: COMMERCIAL

## 2022-04-25 DIAGNOSIS — C83.32 DIFFUSE LARGE B-CELL LYMPHOMA, INTRATHORACIC LYMPH NODES (HCC): ICD-10-CM

## 2022-04-25 DIAGNOSIS — C83.32 DIFFUSE LARGE B-CELL LYMPHOMA OF INTRATHORACIC LYMPH NODES (HCC): ICD-10-CM

## 2022-04-25 LAB — GLUCOSE SERPL-MCNC: 129 MG/DL (ref 65–140)

## 2022-04-25 PROCEDURE — 82948 REAGENT STRIP/BLOOD GLUCOSE: CPT

## 2022-04-26 ENCOUNTER — HOSPITAL ENCOUNTER (OUTPATIENT)
Dept: RADIOLOGY | Age: 47
Discharge: HOME | End: 2022-04-26
Payer: COMMERCIAL

## 2022-04-26 LAB — GLUCOSE SERPL-MCNC: 102 MG/DL (ref 65–140)

## 2022-04-26 PROCEDURE — 78815 PET IMAGE W/CT SKULL-THIGH: CPT

## 2022-04-26 PROCEDURE — A9552 F18 FDG: HCPCS

## 2022-04-26 PROCEDURE — G1004 CDSM NDSC: HCPCS

## 2022-04-26 PROCEDURE — 82948 REAGENT STRIP/BLOOD GLUCOSE: CPT

## 2022-04-27 ENCOUNTER — INTAKE (OUTPATIENT)
Dept: HEMATOLOGY ONCOLOGY | Facility: CLINIC | Age: 47
End: 2022-04-27

## 2022-04-27 ENCOUNTER — OFFICE VISIT (OUTPATIENT)
Dept: HEMATOLOGY ONCOLOGY | Facility: CLINIC | Age: 47
End: 2022-04-27
Payer: COMMERCIAL

## 2022-04-27 VITALS
OXYGEN SATURATION: 98 % | HEIGHT: 68 IN | RESPIRATION RATE: 16 BRPM | HEART RATE: 66 BPM | TEMPERATURE: 98 F | SYSTOLIC BLOOD PRESSURE: 140 MMHG | BODY MASS INDEX: 30.92 KG/M2 | DIASTOLIC BLOOD PRESSURE: 82 MMHG | WEIGHT: 204 LBS

## 2022-04-27 DIAGNOSIS — C83.32 DIFFUSE LARGE B-CELL LYMPHOMA OF INTRATHORACIC LYMPH NODES (HCC): Primary | ICD-10-CM

## 2022-04-27 DIAGNOSIS — T45.1X5A CHEMOTHERAPY INDUCED NEUTROPENIA (HCC): ICD-10-CM

## 2022-04-27 DIAGNOSIS — D70.1 CHEMOTHERAPY INDUCED NEUTROPENIA (HCC): ICD-10-CM

## 2022-04-27 PROCEDURE — 99215 OFFICE O/P EST HI 40 MIN: CPT | Performed by: INTERNAL MEDICINE

## 2022-04-27 RX ORDER — LEVOFLOXACIN 500 MG/1
500 TABLET, FILM COATED ORAL EVERY 24 HOURS
Qty: 30 TABLET | Refills: 1 | Status: SHIPPED | OUTPATIENT
Start: 2022-04-27 | End: 2022-05-23

## 2022-04-27 RX ORDER — ACYCLOVIR 400 MG/1
TABLET ORAL
Qty: 60 TABLET | Refills: 1 | Status: SHIPPED | OUTPATIENT
Start: 2022-04-27 | End: 2022-05-23

## 2022-04-27 RX ORDER — FLUCONAZOLE 200 MG/1
400 TABLET ORAL DAILY
Qty: 60 TABLET | Refills: 1 | Status: SHIPPED | OUTPATIENT
Start: 2022-04-27 | End: 2022-05-23

## 2022-04-27 NOTE — PROGRESS NOTES
Hematology Outpatient Follow - Up Note  Marnie Ordonez 41 y o  male MRN: @ Encounter: [id number]        Date:  4/27/2022        Assessment/ Plan:    62-year-old  male with past medical history of hypertension, he smokes half pack of cigarettes daily for many years, presented with constitutional symptoms such as low-grade fever, night sweats, weight loss over the past few weeks, chest x-ray showed wide mediastinum, CT scan at [facility] on 09/2021 showed 11 x 6 1 x 6 7 cm anterior mediastinal mass, multiple enlarged mediastinal lymph nodes are present as well, bilateral supraclavicular lymphadenopathy as well, no evidence of axillary lymphadenopathy      Differential diagnosis includes lymphoproliferative disorder, sarcoidosis, germ cell tumor, thymoma      excisional biopsy of the right supraclavicular area showed diffuse large B-cell lymphoma, co expressing c-myc, BCL2, nongerminal subtype, Ki-67> 75%, highly positive for CD 20 scattered positivity for CD 30      Positive for EB expressing protein consistent with diffuse large B-cell lymphoma expressing EBV of not otherwise specified type      PET scan showed extensive uptake in the supraclavicular, cervical as well as mediastinal area, 1 single lymph node in the gastrohepatic area consistent with stage IIIA disease     Molecular test came back negative for BCL 2, BCL 6, c-myc     He received R-CHOP cycle 1  On 09/27/2021     After cycle 3  CT scan of the chest showed significant reduction in the mediastinal bulky lymph node/ mass by at least 50%     Cycle 5  Was interrupted because of COVID-19 infection he received antibodies in December 2021      Cycle 6    In January 2022, PET scan in February 2022 showed significant response, he might have residual disease behind the sternum however Deauville score of 2 (background 2 9)    PET scan in April 2022 showed recurrence of disease in the left supraclavicular area not palpable, left retrosternal, portacaval area    I discussed the case with Dr Swain Lean    Will admit the patient for ice and rituximab chemotherapy for 2 cycles and then repeat PET scan to assess response and proceed with car T    Side effects of chemotherapy that are but not limited to pancytopenia, nausea, vomiting, alopecia, hemorrhaging cystitis, fatigue, elevated liver enzymes, elevated creatinine told he agree to proceed he signed the consent, to prevent chemotherapy-induced neutropenia pegfilgrastim is indicated        Labs and imaging studies are reviewed by ordering provider once results are available  If there are findings that need immediate attention, you will be contacted when results available  Discussing results and the implication on your healthcare is best discussed in person at your follow-up visit  HPI:    51-year-old  male with history of hypertension, anxiety, had been feeling with intermittent pleurisy for the past few months, later on he had shortness of breath when he goes up stairs, in the past 1-2 weeks CEA had low-grade fever at night, night sweats, he lost about 5-10 lb     Chest x-ray on 09/10/2021  Widening of the mediastinum     CT scan of the chest showed large anterior mediastinal mass measuring 11 4 x 6 1 x 6 7 cm with multiple enlarged mediastinal lymph nodes for example a prevascular lymph node measuring 2 4 x 1 6 cm, right paratracheal lymph node measuring 1 4 x 2 8 cm, bilateral supraclavicular lymphadenopathy, subcentimeter right hilar lymphadenopathy no axillary lymph node enlargement      Denies any headache blurred vision diplopia angina abdominal pain dysuria hematuria melena hematochezia skin rash pruritus      He smokes half pack of cigarettes daily for many years, drinks alcohol occasionally     Status post excision biopsy of the right supraclavicular area           Final Diagnosis   A   Lymph node, right neck, excision:  -  EBV-positive diffuse large B-cell lymphoma, not otherwise specified (see note)      Electronically signed by Franco Dance, MD on 9/20/2021 at  3:04 PM   Note     The sample shows lymph node parenchyma which is almost entirely replaced by a diffuse population of large highly atypical cells   The atypical cells show significant pleomorphism, prominent nucleoli, mitotic activity, and zonal necrosis   Immunohistochemical stains performed with appropriate controls show background CD3/CD5 positive T-cells with abundant large CD 20 positive B-cells which coexpress CD30, MUM1, partial BCL6, BCL2, c-Myc, and SKYLA HANNAH with high Ki-67 proliferative activity (>75%); the B-cells are negative for CD10, CD5, and cyclin D1   A portion of the sample was submitted for flow cytometric analysis ([id number]; VERÓNICA Fleming , Ph D ) which shows a small population of large B-cells with lambda expression which are CD5 and CD10 negative      Overall, the combination of findings is diagnostic of large B-cell lymphoma with high-grade features (coexpression of c-Myc and BCL2) and non-germinal center phenotype and SKYLA expression consistent with EBV positive diffuse large B-cell lymphoma, not otherwise specified   Additional genetic analysis for pertinent mutations is pending and will be reported as an addendum       Negative for c-myc, BCL 2, BCL 6 by fish     PET scan showed lymphadenopathy extensive in the neck, chest compatible with lymphoma score of 5, the single avid gastrohepatic lymph node  Evaluated by Dr Lesley Oakes, he agreed on the R-CHOP chemotherapy for at least 6 doses and then PET scan thereafter to assess response     COVID-19 infection he received the antibodies in December 2021     Cycle 6  In January 2022      PET scan in February 2022 showed significant response in whole lymph nodes, he might have residual disease behind the sternum with Deauville score 2 (background 2  9)     Repeat PET scan in April 2022 showed recurrence of the disease in the left supraclavicular area, left retrosternal area and possible a new lesion in the portacaval lymph node    Normal LDH            Interval History:        Previous Treatment:         Test Results:    Imaging: NM PET CT skull base to mid thigh    Result Date: 4/26/2022  Narrative: PET/CT SCAN INDICATION: Diffuse large B-cell lymphoma  Restaging exam   C83 32: Diffuse large b-cell lymphoma, intrathoracic lymph nodes MODIFIER: PS COMPARISON: PET CT 2/21/2022 CELL TYPE:  Diffuse large B-cell lymphoma TECHNIQUE:   10 8 mCi F-18-FDG administered IV  Multiplanar attenuation corrected and non attenuation corrected PET images were acquired 60 minutes post injection  Contiguous, low dose, axial CT sections were obtained from the skull vertex through the femurs   Intravenous contrast material was not utilized  This examination, like all CT scans performed in the [facility], was performed utilizing techniques to minimize radiation dose exposure, including the use of iterative reconstruction and automated exposure control  Fasting serum glucose: 102 mg/dl FINDINGS: VISUALIZED BRAIN:   No acute abnormalities are seen  HEAD/NECK:   Increased FDG avid lymph node in the left supraclavicular region, SUV max of 10 3, previously SUV max of 2 5  This measures 2 1 x 1 6 cm image 35 series 3  Prior measurements of 1 0 x 0 7 cm  No additional FDG avid lymph nodes  Previously there was a small cutaneous focus of FDG uptake in the right posterior neck which is no longer present  CT images: Scattered mucosal thickening in the left maxillary sinus  CHEST:   Increased FDG uptake at the anterior mediastinal lesions  Dominant anterior mediastinal mass demonstrates FDG activity in a somewhat lobular configuration, SUV max of 5 8, previously 2 9  This band-like anterior mediastinal mass measures approximately 10 1 x 1 6 cm image 123 series 28, previous measurements of 7 9 x 1 5 cm measured in a similar fashion   CT images: Right-sided Mediport line tip terminates at the cavoatrial junction  ABDOMEN:   There is now mild FDG uptake in a few portacaval lymph nodes, new from the prior exam   Portacaval lymph node demonstrates SUV max of 2 4  This measures up to 9 mm short axis image 174 series 28  CT images: Small fat-containing umbilical hernia  PELVIS: No FDG avid soft tissue lesions are seen  CT images: Unremarkable  OSSEOUS STRUCTURES: No FDG avid lesions are seen  CT images: No significant findings  For reference: SUV max of the mediastinal blood pool: 1 9 SUV max of the liver: 3 1     Impression: 1  Findings concerning for disease recurrence  Deauville score of 5  2  Increased FDG uptake in the enlarging left supraclavicular lymph node suspicious for progression of malignancy  3   Increasing FDG uptake at the anterior mediastinal lesions suspicious for malignancy  4   There is now mild FDG uptake in a few portacaval lymph nodes, nonspecific  This can be reassessed on follow-up  This would represent a Deauville score of 3  The study was marked in EPIC for significant notification  Workstation performed: [id number]     NM follow up    Result Date: 4/25/2022  Narrative: PET/CT SCAN INDICATION:  C83 32: Diffuse large b-cell lymphoma, intrathoracic lymph nodes   , restaging TECHNIQUE:   9 9 mCi F-18-FDG administered IV  Secondary to technical issues, imaging cannot be performed  Impression: Secondary to technical issues, imaging could not be performed after administration of radiotracer   Workstation performed: [id number]       Labs:   Lab Results   Component Value Date    WBC 6 77 04/22/2022    HGB 15 9 04/22/2022    HCT 47 1 04/22/2022    MCV 88 04/22/2022     04/22/2022     Lab Results   Component Value Date    K 4 1 04/22/2022     04/22/2022    CO2 30 04/22/2022    BUN 16 04/22/2022    CREATININE 0 87 04/22/2022    GLUF 122 (H) 04/22/2022    CALCIUM 9 2 04/22/2022    CORRECTEDCA 10 4 (H) 10/14/2021    AST 22 04/22/2022    ALT 50 04/22/2022    ALKPHOS 107 04/22/2022    EGFR 104 04/22/2022       No results found for: IRON, TIBC, FERRITIN    No results found for: [id number]      ROS: Review of Systems   Constitutional: Negative  Negative for appetite change, chills, diaphoresis, fatigue, fever and unexpected weight change  HENT:   Negative for hearing loss, lump/mass, mouth sores, nosebleeds, sore throat, trouble swallowing and voice change  Eyes: Negative  Negative for eye problems and icterus  Respiratory: Negative  Negative for chest tightness, cough, hemoptysis and shortness of breath  Cardiovascular: Negative for chest pain and leg swelling  Gastrointestinal: Negative for abdominal distention, abdominal pain, blood in stool, constipation, diarrhea and nausea  Endocrine: Negative  Genitourinary: Negative for dysuria, frequency, hematuria and pelvic pain  Musculoskeletal: Negative  Negative for arthralgias, back pain, flank pain, gait problem, myalgias and neck stiffness  Skin: Negative for itching and rash  Neurological: Negative for dizziness, gait problem, headaches, light-headedness, numbness and speech difficulty  Hematological: Negative for adenopathy  Does not bruise/bleed easily  Psychiatric/Behavioral: Negative for confusion, decreased concentration, depression and sleep disturbance  The patient is not nervous/anxious  Current Medications: Reviewed  Allergies: Reviewed  PMH/FH/SH:  Reviewed      Physical Exam:    Body surface area is 2 06 meters squared      Wt Readings from Last 3 Encounters:   04/27/22 92 5 kg (204 lb)   02/24/22 92 8 kg (204 lb 9 6 oz)   01/31/22 90 7 kg (200 lb)        Temp Readings from Last 3 Encounters:   04/27/22 98 °F (36 7 °C) (Temporal)   02/24/22 (!) 96 °F (35 6 °C)   01/06/22 (!) 97 2 °F (36 2 °C) (Temporal)        BP Readings from Last 3 Encounters:   04/27/22 140/82   02/24/22 140/82   01/06/22 145/98         Pulse Readings from Last 3 Encounters:   04/27/22 66   22 78   22 73        Physical Exam  Vitals reviewed  Constitutional:       General: He is not in acute distress  Appearance: He is well-developed  He is not diaphoretic  HENT:      Head: Normocephalic and atraumatic  Eyes:      Conjunctiva/sclera: Conjunctivae normal    Neck:      Trachea: No tracheal deviation  Cardiovascular:      Rate and Rhythm: Normal rate and regular rhythm  Heart sounds: No murmur heard  No friction rub  No gallop  Pulmonary:      Effort: Pulmonary effort is normal  No respiratory distress  Breath sounds: Normal breath sounds  No wheezing or rales  Chest:      Chest wall: No tenderness  Abdominal:      General: There is no distension  Palpations: Abdomen is soft  Tenderness: There is no abdominal tenderness  Musculoskeletal:      Cervical back: Normal range of motion and neck supple  Lymphadenopathy:      Cervical: No cervical adenopathy  Skin:     General: Skin is warm and dry  Coloration: Skin is not pale  Findings: No erythema  Comments: Multiple tattoos   Neurological:      Mental Status: He is alert and oriented to person, place, and time  Psychiatric:         Behavior: Behavior normal          Thought Content: Thought content normal          Judgment: Judgment normal          ECO  Goals and Barriers:  Current Goal: Minimize effects of disease  Barriers: None  Patient's Capacity to Self Care:  Patient is able to self care      Code Status: [unfilled] Cephalic

## 2022-04-28 ENCOUNTER — TELEPHONE (OUTPATIENT)
Dept: HEMATOLOGY ONCOLOGY | Facility: CLINIC | Age: 47
End: 2022-04-28

## 2022-04-28 DIAGNOSIS — C83.32 DIFFUSE LARGE B-CELL LYMPHOMA OF INTRATHORACIC LYMPH NODES (HCC): Primary | ICD-10-CM

## 2022-05-02 DIAGNOSIS — C83.32 DIFFUSE LARGE B-CELL LYMPHOMA OF INTRATHORACIC LYMPH NODES (HCC): Primary | ICD-10-CM

## 2022-05-02 NOTE — PROGRESS NOTES
Spoke with pt who voiced concerns about prognosis after reading Dr Virginia Arroyo report  Reviewed report with Dr Melchor Tony per pt's request  Willam Tony feels prognosis would indeed be poor if pt chose not to pursue therapy

## 2022-05-03 ENCOUNTER — TELEPHONE (OUTPATIENT)
Dept: HEMATOLOGY ONCOLOGY | Facility: CLINIC | Age: 47
End: 2022-05-03

## 2022-05-03 DIAGNOSIS — C83.32 DIFFUSE LARGE B-CELL LYMPHOMA OF INTRATHORACIC LYMPH NODES (HCC): Primary | ICD-10-CM

## 2022-05-03 NOTE — TELEPHONE ENCOUNTER
Orders have been scheduled and patient has been given instructions for walk in orders as well   Patient notified of appointments

## 2022-05-03 NOTE — TELEPHONE ENCOUNTER
Nurse @ [facility] called stating Benja Cole will need some testing done  He needs to have MRI of brain w/ & w/o contrast, an Echo & EKG, and also a chest X-ray  Please put those orders in for Benja Fam and I will get them scheduled

## 2022-05-03 NOTE — PROGRESS NOTES
Received request from [facility] to schedule CXR, MRI, Echo and EKG as part of CAR-T workup

## 2022-05-04 ENCOUNTER — HOSPITAL ENCOUNTER (OUTPATIENT)
Dept: NON INVASIVE DIAGNOSTICS | Facility: CLINIC | Age: 47
Discharge: HOME | End: 2022-05-04
Payer: COMMERCIAL

## 2022-05-04 VITALS
SYSTOLIC BLOOD PRESSURE: 140 MMHG | HEIGHT: 68 IN | HEART RATE: 80 BPM | DIASTOLIC BLOOD PRESSURE: 80 MMHG | BODY MASS INDEX: 30.92 KG/M2 | WEIGHT: 204 LBS

## 2022-05-04 DIAGNOSIS — C83.32 DIFFUSE LARGE B-CELL LYMPHOMA OF INTRATHORACIC LYMPH NODES (HCC): ICD-10-CM

## 2022-05-04 LAB
AORTIC ROOT: 2.9 CM
APICAL FOUR CHAMBER EJECTION FRACTION: 63 %
ATRIAL RATE: 59 BPM
E WAVE DECELERATION TIME: 273 MS
FRACTIONAL SHORTENING: 39 % (ref 28–44)
GLOBAL LONGITUIDAL STRAIN: -20 %
INTERVENTRICULAR SEPTUM IN DIASTOLE (PARASTERNAL SHORT AXIS VIEW): 1.1 CM
INTERVENTRICULAR SEPTUM: 1.1 CM (ref 0.55–1.02)
LEFT ATRIUM AREA SYSTOLE SINGLE PLANE A4C: 13.5 CM2
LEFT ATRIUM SIZE: 3.7 CM
LEFT INTERNAL DIMENSION IN SYSTOLE: 2.7 CM (ref 3.37–5.1)
LEFT VENTRICULAR INTERNAL DIMENSION IN DIASTOLE: 4.4 CM (ref 5.56–8.29)
LEFT VENTRICULAR POSTERIOR WALL IN END DIASTOLE: 1.1 CM (ref 0.53–1.01)
LEFT VENTRICULAR STROKE VOLUME: 62 ML
LVSV (TEICH): 62 ML
MV E'TISSUE VEL-SEP: 9 CM/S
MV PEAK A VEL: 0.78 M/S
MV PEAK E VEL: 80 CM/S
MV STENOSIS PRESSURE HALF TIME: 79 MS
MV VALVE AREA P 1/2 METHOD: 2.78 CM2
P AXIS: 20 DEGREES
PR INTERVAL: 172 MS
QRS AXIS: 6 DEGREES
QRSD INTERVAL: 96 MS
QT INTERVAL: 386 MS
QTC INTERVAL: 382 MS
RIGHT ATRIUM AREA SYSTOLE A4C: 14.2 CM2
RIGHT VENTRICLE ID DIMENSION: 3.2 CM
SL CV LV EF: 65
SL CV PED ECHO LEFT VENTRICLE DIASTOLIC VOLUME (MOD BIPLANE) 2D: 88 ML
SL CV PED ECHO LEFT VENTRICLE SYSTOLIC VOLUME (MOD BIPLANE) 2D: 27 ML
T WAVE AXIS: 14 DEGREES
VENTRICULAR RATE: 59 BPM
Z-SCORE OF INTERVENTRICULAR SEPTUM IN END DIASTOLE: 2.59
Z-SCORE OF LEFT VENTRICULAR DIMENSION IN END DIASTOLE: -4.34
Z-SCORE OF LEFT VENTRICULAR DIMENSION IN END SYSTOLE: -3.38
Z-SCORE OF LEFT VENTRICULAR POSTERIOR WALL IN END DIASTOLE: 2.7

## 2022-05-04 PROCEDURE — 93356 MYOCRD STRAIN IMG SPCKL TRCK: CPT

## 2022-05-04 PROCEDURE — 93306 TTE W/DOPPLER COMPLETE: CPT

## 2022-05-04 PROCEDURE — 93010 ELECTROCARDIOGRAM REPORT: CPT | Performed by: INTERNAL MEDICINE

## 2022-05-04 PROCEDURE — 93005 ELECTROCARDIOGRAM TRACING: CPT

## 2022-05-04 PROCEDURE — 93356 MYOCRD STRAIN IMG SPCKL TRCK: CPT | Performed by: INTERNAL MEDICINE

## 2022-05-04 PROCEDURE — 93306 TTE W/DOPPLER COMPLETE: CPT | Performed by: INTERNAL MEDICINE

## 2022-05-05 ENCOUNTER — APPOINTMENT (OUTPATIENT)
Dept: RADIOLOGY | Age: 47
End: 2022-05-05
Payer: COMMERCIAL

## 2022-05-05 ENCOUNTER — APPOINTMENT (OUTPATIENT)
Dept: LAB | Age: 47
End: 2022-05-05
Payer: COMMERCIAL

## 2022-05-05 DIAGNOSIS — C83.32 DIFFUSE LARGE B-CELL LYMPHOMA OF INTRATHORACIC LYMPH NODES (HCC): ICD-10-CM

## 2022-05-05 LAB
ALBUMIN SERPL BCP-MCNC: 4.1 G/DL (ref 3.5–5)
ALP SERPL-CCNC: 113 U/L (ref 46–116)
ALT SERPL W P-5'-P-CCNC: 39 U/L (ref 12–78)
ANION GAP SERPL CALCULATED.3IONS-SCNC: 5 MMOL/L (ref 4–13)
AST SERPL W P-5'-P-CCNC: 21 U/L (ref 5–45)
BASOPHILS # BLD AUTO: 0.06 THOUSANDS/ΜL (ref 0–0.1)
BASOPHILS NFR BLD AUTO: 1 % (ref 0–1)
BILIRUB SERPL-MCNC: 0.59 MG/DL (ref 0.2–1)
BUN SERPL-MCNC: 13 MG/DL (ref 5–25)
CALCIUM SERPL-MCNC: 9 MG/DL (ref 8.3–10.1)
CHLORIDE SERPL-SCNC: 102 MMOL/L (ref 100–108)
CO2 SERPL-SCNC: 30 MMOL/L (ref 21–32)
CREAT SERPL-MCNC: 0.84 MG/DL (ref 0.6–1.3)
EOSINOPHIL # BLD AUTO: 0.17 THOUSAND/ΜL (ref 0–0.61)
EOSINOPHIL NFR BLD AUTO: 3 % (ref 0–6)
ERYTHROCYTE [DISTWIDTH] IN BLOOD BY AUTOMATED COUNT: 12.8 % (ref 11.6–15.1)
GFR SERPL CREATININE-BSD FRML MDRD: 105 ML/MIN/1.73SQ M
GLUCOSE P FAST SERPL-MCNC: 109 MG/DL (ref 65–99)
HCT VFR BLD AUTO: 47.2 % (ref 36.5–49.3)
HGB BLD-MCNC: 15.8 G/DL (ref 12–17)
IMM GRANULOCYTES # BLD AUTO: 0.02 THOUSAND/UL (ref 0–0.2)
IMM GRANULOCYTES NFR BLD AUTO: 0 % (ref 0–2)
LYMPHOCYTES # BLD AUTO: 0.72 THOUSANDS/ΜL (ref 0.6–4.47)
LYMPHOCYTES NFR BLD AUTO: 11 % (ref 14–44)
MCH RBC QN AUTO: 29.1 PG (ref 26.8–34.3)
MCHC RBC AUTO-ENTMCNC: 33.5 G/DL (ref 31.4–37.4)
MCV RBC AUTO: 87 FL (ref 82–98)
MONOCYTES # BLD AUTO: 0.78 THOUSAND/ΜL (ref 0.17–1.22)
MONOCYTES NFR BLD AUTO: 11 % (ref 4–12)
NEUTROPHILS # BLD AUTO: 5.11 THOUSANDS/ΜL (ref 1.85–7.62)
NEUTS SEG NFR BLD AUTO: 74 % (ref 43–75)
NRBC BLD AUTO-RTO: 0 /100 WBCS
PLATELET # BLD AUTO: 259 THOUSANDS/UL (ref 149–390)
PMV BLD AUTO: 10 FL (ref 8.9–12.7)
POTASSIUM SERPL-SCNC: 4 MMOL/L (ref 3.5–5.3)
PROT SERPL-MCNC: 7.1 G/DL (ref 6.4–8.2)
RBC # BLD AUTO: 5.43 MILLION/UL (ref 3.88–5.62)
SODIUM SERPL-SCNC: 137 MMOL/L (ref 136–145)
WBC # BLD AUTO: 6.86 THOUSAND/UL (ref 4.31–10.16)

## 2022-05-05 PROCEDURE — 85025 COMPLETE CBC W/AUTO DIFF WBC: CPT

## 2022-05-05 PROCEDURE — 80053 COMPREHEN METABOLIC PANEL: CPT

## 2022-05-05 PROCEDURE — 71046 X-RAY EXAM CHEST 2 VIEWS: CPT

## 2022-05-05 PROCEDURE — 36415 COLL VENOUS BLD VENIPUNCTURE: CPT

## 2022-05-06 ENCOUNTER — PATIENT MESSAGE (OUTPATIENT)
Dept: HEMATOLOGY ONCOLOGY | Facility: CLINIC | Age: 47
End: 2022-05-06

## 2022-05-06 DIAGNOSIS — F41.9 ANXIETY: ICD-10-CM

## 2022-05-06 DIAGNOSIS — C83.32 DIFFUSE LARGE B-CELL LYMPHOMA OF INTRATHORACIC LYMPH NODES (HCC): Primary | ICD-10-CM

## 2022-05-06 NOTE — PROGRESS NOTES
Communicated with Martina Rodríguez RN regarding patient last RCHOP was Feb 2022 and patient can have subsequent rate on rituxan on Monday 5-9-22  Per Dr Gustavo Murillo, no need for allopurinol prescription and uric acid labs to be done   Patient had Hepatitis testing done Sept 2021

## 2022-05-09 ENCOUNTER — HOSPITAL ENCOUNTER (OUTPATIENT)
Dept: INFUSION CENTER | Facility: HOSPITAL | Age: 47
Discharge: HOME | End: 2022-05-09
Attending: INTERNAL MEDICINE

## 2022-05-12 ENCOUNTER — DOCUMENTATION (OUTPATIENT)
Dept: HEMATOLOGY ONCOLOGY | Facility: CLINIC | Age: 47
End: 2022-05-12

## 2022-05-12 ENCOUNTER — HOSPITAL ENCOUNTER (OUTPATIENT)
Dept: INFUSION CENTER | Facility: CLINIC | Age: 47
End: 2022-05-12

## 2022-05-12 ENCOUNTER — HOSPITAL ENCOUNTER (OUTPATIENT)
Dept: INFUSION CENTER | Facility: HOSPITAL | Age: 47
Discharge: HOME | End: 2022-05-12
Attending: INTERNAL MEDICINE
Payer: COMMERCIAL

## 2022-05-12 VITALS
HEIGHT: 68 IN | RESPIRATION RATE: 16 BRPM | BODY MASS INDEX: 30.94 KG/M2 | WEIGHT: 204.15 LBS | SYSTOLIC BLOOD PRESSURE: 150 MMHG | DIASTOLIC BLOOD PRESSURE: 88 MMHG | TEMPERATURE: 97.7 F | HEART RATE: 72 BPM

## 2022-05-12 DIAGNOSIS — C83.32 DIFFUSE LARGE B-CELL LYMPHOMA OF INTRATHORACIC LYMPH NODES (HCC): Primary | ICD-10-CM

## 2022-05-12 DIAGNOSIS — F41.9 ANXIETY: ICD-10-CM

## 2022-05-12 DIAGNOSIS — D70.1 CHEMOTHERAPY INDUCED NEUTROPENIA (HCC): ICD-10-CM

## 2022-05-12 DIAGNOSIS — T45.1X5A CHEMOTHERAPY INDUCED NEUTROPENIA (HCC): ICD-10-CM

## 2022-05-12 LAB — URATE SERPL-MCNC: 6.3 MG/DL (ref 3.5–8.5)

## 2022-05-12 PROCEDURE — 96377 APPLICATON ON-BODY INJECTOR: CPT

## 2022-05-12 PROCEDURE — 96415 CHEMO IV INFUSION ADDL HR: CPT

## 2022-05-12 PROCEDURE — 96367 TX/PROPH/DG ADDL SEQ IV INF: CPT

## 2022-05-12 PROCEDURE — 96417 CHEMO IV INFUS EACH ADDL SEQ: CPT

## 2022-05-12 PROCEDURE — 84550 ASSAY OF BLOOD/URIC ACID: CPT | Performed by: INTERNAL MEDICINE

## 2022-05-12 PROCEDURE — 96413 CHEMO IV INFUSION 1 HR: CPT

## 2022-05-12 RX ORDER — ALPRAZOLAM 0.5 MG/1
0.5 TABLET ORAL ONCE AS NEEDED
Status: CANCELLED
Start: 2022-05-12

## 2022-05-12 RX ORDER — ALPRAZOLAM 0.5 MG/1
0.5 TABLET ORAL ONCE AS NEEDED
Status: COMPLETED | OUTPATIENT
Start: 2022-05-12 | End: 2022-05-12

## 2022-05-12 RX ORDER — ACETAMINOPHEN 325 MG/1
650 TABLET ORAL ONCE
Status: COMPLETED | OUTPATIENT
Start: 2022-05-12 | End: 2022-05-12

## 2022-05-12 RX ORDER — SODIUM CHLORIDE 9 MG/ML
20 INJECTION, SOLUTION INTRAVENOUS ONCE
Status: COMPLETED | OUTPATIENT
Start: 2022-05-12 | End: 2022-05-12

## 2022-05-12 RX ADMIN — PEGFILGRASTIM 6 MG: KIT SUBCUTANEOUS at 15:00

## 2022-05-12 RX ADMIN — ALPRAZOLAM 0.5 MG: 0.5 TABLET ORAL at 14:27

## 2022-05-12 RX ADMIN — DIPHENHYDRAMINE HYDROCHLORIDE 25 MG: 50 INJECTION, SOLUTION INTRAMUSCULAR; INTRAVENOUS at 08:55

## 2022-05-12 RX ADMIN — POLATUZUMAB VEDOTIN 166 MG: 140 INJECTION, POWDER, LYOPHILIZED, FOR SOLUTION INTRAVENOUS at 12:55

## 2022-05-12 RX ADMIN — ACETAMINOPHEN 650 MG: 325 TABLET ORAL at 08:55

## 2022-05-12 RX ADMIN — SODIUM CHLORIDE 20 ML/HR: 9 INJECTION, SOLUTION INTRAVENOUS at 08:50

## 2022-05-12 RX ADMIN — DEXAMETHASONE SODIUM PHOSPHATE: 10 INJECTION, SOLUTION INTRAMUSCULAR; INTRAVENOUS at 09:17

## 2022-05-12 RX ADMIN — SODIUM CHLORIDE 772.6 MG: 0.9 INJECTION, SOLUTION INTRAVENOUS at 09:54

## 2022-05-12 NOTE — PLAN OF CARE
Problem: Potential for Falls  Goal: Patient will remain free of falls  Description: INTERVENTIONS:  - Educate patient/family on patient safety including physical limitations  - Instruct patient to call for assistance with activity   - Consult OT/PT to assist with strengthening/mobility   - Keep Call bell within reach  - Keep bed low and locked with side rails adjusted as appropriate  - Apply yellow socks and bracelet for high fall risk patients  - Consider moving patient to room near nurses station  Outcome: Progressing

## 2022-05-12 NOTE — PROGRESS NOTES
Plan changed to Ruxiance and Polivy per pt request (no Bendamustine)  Auth obtained following peer to peer  First infusion 5/12 at [address] [place] [address]  Verbal consent obtained

## 2022-05-12 NOTE — PROGRESS NOTES
Patient tolerated IV chemotherapy without issues  90 minute observation time completed with no complications  Neulasta Onpro applied with green light flashing, instructed to take it off at 8pm tomorrow night  Next appt confirmed, AVS refused

## 2022-05-16 ENCOUNTER — HOSPITAL ENCOUNTER (OUTPATIENT)
Dept: RADIOLOGY | Facility: IMAGING CENTER | Age: 47
Discharge: HOME | End: 2022-05-16
Payer: COMMERCIAL

## 2022-05-16 DIAGNOSIS — C83.32 DIFFUSE LARGE B-CELL LYMPHOMA OF INTRATHORACIC LYMPH NODES (HCC): ICD-10-CM

## 2022-05-16 PROCEDURE — 70553 MRI BRAIN STEM W/O & W/DYE: CPT

## 2022-05-16 PROCEDURE — A9585 GADOBUTROL INJECTION: HCPCS | Performed by: INTERNAL MEDICINE

## 2022-05-16 RX ADMIN — GADOBUTROL 9 ML: 604.72 INJECTION INTRAVENOUS at 08:19

## 2022-05-23 ENCOUNTER — OFFICE VISIT (OUTPATIENT)
Dept: HEMATOLOGY ONCOLOGY | Facility: CLINIC | Age: 47
End: 2022-05-23
Payer: COMMERCIAL

## 2022-05-23 VITALS
BODY MASS INDEX: 30.16 KG/M2 | OXYGEN SATURATION: 97 % | WEIGHT: 199 LBS | DIASTOLIC BLOOD PRESSURE: 84 MMHG | SYSTOLIC BLOOD PRESSURE: 142 MMHG | HEIGHT: 68 IN | RESPIRATION RATE: 18 BRPM | HEART RATE: 80 BPM | TEMPERATURE: 96.7 F

## 2022-05-23 DIAGNOSIS — C83.32 DIFFUSE LARGE B-CELL LYMPHOMA OF INTRATHORACIC LYMPH NODES (HCC): Primary | ICD-10-CM

## 2022-05-23 PROCEDURE — 99215 OFFICE O/P EST HI 40 MIN: CPT | Performed by: PHYSICIAN ASSISTANT

## 2022-05-23 NOTE — PROGRESS NOTES
Hematology/Oncology Outpatient Follow- up Note  Bertha Chakraborty 41 y o  male MRN: @ Encounter: [id number]        Date:  5/23/2022      Assessment / Plan:    Diffuse large B-cell lymphoma, co expressing c-myc, BCL2, nongerminal subtype, Ki-67> 75%, highly positive for CD 20 scattered positivity for CD 30 Positive for EBV diagnosed from excisional LN biopsy right supraclavicular area 9/2021  Molecular test came back negative for BCL 2, BCL 6, c-myc    He presented with pleurisy, CROOK and low-grade fevers, night sweats, 10lb weight loss     PET scan showed extensive uptake in the supraclavicular, cervical as well as mediastinal area, 1 single lymph node in the gastrohepatic area consistent with stage IIIA disease        He received R-CHOP,  cycle 1 on 09/27/2021     After cycle 3 CT scan of the chest showed significant reduction in the mediastinal bulky lymph node/ mass by at least 50%     Cycle 5  Was interrupted because of COVID-19 infection  He received antibodies in December 2021      Cycle 6 inJanuary 2022  PET scan in February 2022 showed significant response, he might have residual disease behind the sternum however Deauville score of 2 (background 2 9)     PET scan in April 2022 showed recurrence of disease in the left supraclavicular area not palpable, left retrosternal, portacaval area     Patient had second opinion with Dr Silva [facility] chemotherapy for 2 cycles recommended followed by repeat PET scan to assess response and proceed with car T  He had 3rd opinion with Dr Ilene Camejo 5/5/22  He recommended bridging therapy with polatuzumab vedotin monotherapy followed by [id number]  Rituxan + Polivy cycle #1 5/12/22  Plan for T cell apheresis on 5/31 followed by C2 of polatuzumab-rituximab later that week  Repeat PET-CT performed in mid-June for response assessment prior to tentatively accepting his Car-T product back in late June        Cycle #2 due 6/2/22    Patient has reached out to [facility] to see if pet/ct can be performed in [facility]  Date would be around 6/16/22    Timewise, cycle #3 if needed due 6/23/22  Will schedule and cancel appointment if not needed  He is tentatively scheduled for admission at [facility] 7/5/22  HPI:   Beverly Vidales is a 81-year-old  male seen initially 9/2021 regarding Mediastinal lymphadenopathy, supraclavicular lymphadenopathy    He has a history of hypertension, anxiety  He had been feeling with intermittent pleurisy for the past few months, later on he had CROOK and low-grade fever at night, night sweats, he lost about 5-10 lb  Chest x-ray on 09/10/2021  Widening of the mediastinum    9/10/21 CT chest at [facility]- 11 x 6 1 x 6 7 cm anterior mediastinal mass, multiple enlarged mediastinal lymph nodes are present as well, bilateral supraclavicular lymphadenopathy    CT neck 9/13/21 - There are multiple supraclavicular nodes identified bilaterally measuring up to 2 4 cm in long axis  Additional small posterior jugular chain/level 5 nodes are present  Large mediastinal mass within the anterior superior mediastinum with multiple surrounding slightly heterogeneously enhancing enlarged lymph nodes extending inferiorly beyond the scope of this exam    9/15/21 excisional Lymph node biopsy from right neck by Dr Sarah Herrera  EBV-positive diffuse large B-cell lymphoma/  No evidence of MYC Rearrangement  No evidence of BCL2-IGH [translocation t(14;18)] gene rearrangement  No evidence of BCL6 (3q27) breakpoint translocation      Pet/CT 9/22/21 - Left lower neck lymph node demonstrates SUV max of 11 2  This measures 2 5 x 2 2 cm  Extensive FDG avid mediastinal lymphadenopathy  Dominant anterior mediastinal mass demonstrates SUV max of 8 1 on the left  This measures 10 5 x 5 0 cm  Right paratracheal lymph node demonstrates SUV max of 10 1  This measures 2 5 x 1 5    Right infrahilar focus demonstrates SUV max of 3 9   This is not well-seen on the limited noncontrast CT images  Small right internal mammary lymph node demonstrates SUV max of 4 2  This measures 1 1 x 0 7 cm   Single Distal periesophageal lymph node demonstrates SUV max of 3 8  This measures up to 1 cm in size  No suspicious hypermetabolic lesions in the pelvis  He received R-CHOP,  cycle 1 on 09/27/2021     After cycle 3 CT scan of the chest showed significant reduction in the mediastinal bulky lymph node/ mass by at least 50%     Cycle 5  Was interrupted because of COVID-19 infection  He received antibodies in December 2021      Cycle 6 in January 2022  PET scan in February 2022 showed significant response, he might have residual disease behind the sternum however Deauville score of 2 (background 2 9)     PET scan in April 2022 showed recurrence of disease in the left supraclavicular area not palpable, left retrosternal, portacaval area  Patient had second opinion with Dr Britany Jarquin 4/28/22  RICE chemotherapy for 2 cycles recommended followed by repeat PET scan to assess response and proceed with carT  3rd opinion at [facility] with Dr Jean-Pierre Burton 5/5/22  He recommended bridging therapy with polatuzumab vedotin monotherapy followed by [id number]  Rituxan + Polivy cycle #1 5/12/22  Plan for T cell apheresis on 5/31 followed by C2 of polatuzumab-rituximab later that week  Repeat PET-CT performed in mid-June for response assessment prior to tentatively accepting his Janae Pimple product back in late June  Cycle #2 due 6/2/22        Interval History:  He had some fatigue and bone pain after receiving Rituxan and Polivy but otherwise it was very well tolerated        Test Results:        Labs:   Lab Results   Component Value Date    HGB 15 8 05/05/2022    HCT 47 2 05/05/2022    MCV 87 05/05/2022     05/05/2022    WBC 6 86 05/05/2022    NRBC 0 05/05/2022    BANDSPCT 1 11/04/2021     Lab Results   Component Value Date    K 4 0 05/05/2022     05/05/2022    CO2 30 05/05/2022    BUN 13 05/05/2022    CREATININE 0 84 05/05/2022    GLUF 109 (H) 05/05/2022    CALCIUM 9 0 05/05/2022    CORRECTEDCA 10 4 (H) 10/14/2021    AST 21 05/05/2022    ALT 39 05/05/2022    ALKPHOS 113 05/05/2022    EGFR 105 05/05/2022       Imaging: XR chest pa & lateral    Result Date: 5/6/2022  Narrative: CHEST INDICATION:   C83 32: Diffuse large b-cell lymphoma, intrathoracic lymph nodes  COMPARISON:  10/04/2021 EXAM PERFORMED/VIEWS:  XR CHEST PA & LATERAL  The frontal view was performed utilizing dual energy radiographic technique  Images: 4 FINDINGS: Cardiomediastinal silhouette appears unremarkable  A right-sided MediPort terminates in the SVC  The lungs are clear  No pneumothorax or pleural effusion  Osseous structures appear within normal limits for patient age  Impression: No acute cardiopulmonary disease  Workstation performed: [id number]     MRI brain w wo contrast    Result Date: 5/17/2022  Narrative: MRI BRAIN WITH AND WITHOUT CONTRAST INDICATION: C83 32: Diffuse large b-cell lymphoma, intrathoracic lymph nodes  COMPARISON:  None  TECHNIQUE: Sagittal T1, axial T2, axial FLAIR, axial T1, axial [place], axial diffusion  Sagittal, axial T1 postcontrast   Axial bravo postcontrast with coronal reconstructions  IV Contrast:  9 mL of Gadobutrol injection (SINGLE-DOSE)  IMAGE QUALITY:   Diagnostic  FINDINGS: BRAIN PARENCHYMA:  There is no discrete mass, mass effect or midline shift  There is no intracranial hemorrhage  Normal posterior fossa  Diffusion imaging is unremarkable  Nonspecific FLAIR signal abnormality involving bilateral cerebral white matter, predominantly periventricular white matter  There is tiny CSF signal intensity nonenhancing cystic focus in the left periventricular white matter (series 401 image 17)  Postcontrast imaging of the brain demonstrates no abnormal enhancement  VENTRICLES:  Normal for the patient's age   SELLA AND PITUITARY GLAND:  Normal  ORBITS:  Normal  PARANASAL SINUSES:  Retention cysts noted within bilateral maxillary sinuses  VASCULATURE:  Evaluation of the major intracranial vasculature demonstrates appropriate flow voids  CALVARIUM AND SKULL BASE:  Normal  EXTRACRANIAL SOFT TISSUES:  Normal      Impression: 1  No intracranial enhancing mass or pathologic enhancement  2   Nonspecific symmetric white matter change, possibly treatment related, needs comparison with prior outside imaging and/or follow-up to assess interval change  Workstation performed: [id number]     NM PET CT skull base to mid thigh    Result Date: 4/26/2022  Narrative: PET/CT SCAN INDICATION: Diffuse large B-cell lymphoma  Restaging exam   C83 32: Diffuse large b-cell lymphoma, intrathoracic lymph nodes MODIFIER: PS COMPARISON: PET CT 2/21/2022 CELL TYPE:  Diffuse large B-cell lymphoma TECHNIQUE:   10 8 mCi F-18-FDG administered IV  Multiplanar attenuation corrected and non attenuation corrected PET images were acquired 60 minutes post injection  Contiguous, low dose, axial CT sections were obtained from the skull vertex through the femurs   Intravenous contrast material was not utilized  This examination, like all CT scans performed in the [facility], was performed utilizing techniques to minimize radiation dose exposure, including the use of iterative reconstruction and automated exposure control  Fasting serum glucose: 102 mg/dl FINDINGS: VISUALIZED BRAIN:   No acute abnormalities are seen  HEAD/NECK:   Increased FDG avid lymph node in the left supraclavicular region, SUV max of 10 3, previously SUV max of 2 5  This measures 2 1 x 1 6 cm image 35 series 3  Prior measurements of 1 0 x 0 7 cm  No additional FDG avid lymph nodes  Previously there was a small cutaneous focus of FDG uptake in the right posterior neck which is no longer present  CT images: Scattered mucosal thickening in the left maxillary sinus  CHEST:   Increased FDG uptake at the anterior mediastinal lesions  Dominant anterior mediastinal mass demonstrates FDG activity in a somewhat lobular configuration, SUV max of 5 8, previously 2 9  This band-like anterior mediastinal mass measures approximately 10 1 x 1 6 cm image 123 series 28, previous measurements of 7 9 x 1 5 cm measured in a similar fashion  CT images: Right-sided Mediport line tip terminates at the cavoatrial junction  ABDOMEN:   There is now mild FDG uptake in a few portacaval lymph nodes, new from the prior exam   Portacaval lymph node demonstrates SUV max of 2 4  This measures up to 9 mm short axis image 174 series 28  CT images: Small fat-containing umbilical hernia  PELVIS: No FDG avid soft tissue lesions are seen  CT images: Unremarkable  OSSEOUS STRUCTURES: No FDG avid lesions are seen  CT images: No significant findings  For reference: SUV max of the mediastinal blood pool: 1 9 SUV max of the liver: 3 1     Impression: 1  Findings concerning for disease recurrence  Deauville score of 5  2  Increased FDG uptake in the enlarging left supraclavicular lymph node suspicious for progression of malignancy  3   Increasing FDG uptake at the anterior mediastinal lesions suspicious for malignancy  4   There is now mild FDG uptake in a few portacaval lymph nodes, nonspecific  This can be reassessed on follow-up  This would represent a Deauville score of 3  The study was marked in EPIC for significant notification  Workstation performed: [id number]     NM follow up    Result Date: 4/25/2022  Narrative: PET/CT SCAN INDICATION:  C83 32: Diffuse large b-cell lymphoma, intrathoracic lymph nodes   , restaging TECHNIQUE:   9 9 mCi F-18-FDG administered IV  Secondary to technical issues, imaging cannot be performed  Impression: Secondary to technical issues, imaging could not be performed after administration of radiotracer   Workstation performed: [id number]     Echo complete w/ contrast if indicated    Result Date: 5/4/2022  Narrative: Reema Roa  Left Ventricle: Left ventricular cavity size is normal  Wall thickness is normal  The left ventricular ejection fraction is 65%  Systolic function is normal  Global longitudinal strain is normal at -20%  Wall motion is normal  Diastolic function is normal    Right Ventricle: Right ventricular cavity size is normal            ROS:  As mentioned in HPI & Interval History otherwise 14 point ROS negative  Allergies: No Known Allergies  Current Medications: Reviewed  PMH/FH/SH:  Reviewed      Physical Exam:    There is no height or weight on file to calculate BSA  Ht Readings from Last 3 Encounters:   05/12/22 5' 7 99" (1 727 m)   05/04/22 5' 8" (1 727 m)   04/27/22 5' 8" (1 727 m)        Wt Readings from Last 3 Encounters:   05/12/22 92 6 kg (204 lb 2 3 oz)   05/04/22 92 5 kg (204 lb)   04/27/22 92 5 kg (204 lb)        Temp Readings from Last 3 Encounters:   05/12/22 97 7 °F (36 5 °C) (Temporal)   04/27/22 98 °F (36 7 °C) (Temporal)   02/24/22 (!) 96 °F (35 6 °C)        BP Readings from Last 3 Encounters:   05/12/22 150/88   05/04/22 140/80   04/27/22 140/82           Physical Exam  Vitals reviewed  Constitutional:       General: He is not in acute distress  Appearance: He is well-developed  He is not diaphoretic  HENT:      Head: Normocephalic and atraumatic  Eyes:      Conjunctiva/sclera: Conjunctivae normal    Neck:      Trachea: No tracheal deviation  Cardiovascular:      Rate and Rhythm: Normal rate and regular rhythm  Heart sounds: No murmur heard  No friction rub  No gallop  Pulmonary:      Effort: Pulmonary effort is normal  No respiratory distress  Breath sounds: Normal breath sounds  No wheezing or rales  Chest:      Chest wall: No tenderness  Abdominal:      General: There is no distension  Palpations: Abdomen is soft  Tenderness: There is no abdominal tenderness  Musculoskeletal:      Cervical back: Normal range of motion and neck supple     Lymphadenopathy: Cervical: No cervical adenopathy  Skin:     General: Skin is warm and dry  Coloration: Skin is not pale  Findings: No erythema  Neurological:      Mental Status: He is alert and oriented to person, place, and time  Psychiatric:         Behavior: Behavior normal          Thought Content:  Thought content normal          Judgment: Judgment normal          ECO      Emergency Contacts:    Extended Emergency Contact Information  Primary Emergency Contact: Dali Gabriel  Address: [address] [place] [address], [address] Phone: [phone]  Mobile Phone: [phone]  Relation: Spouse  Secondary Emergency Contact: Manfred Javier  Address: [facility] Phone: [phone]  Mobile Phone: [phone]  Relation: Father

## 2022-06-02 ENCOUNTER — HOSPITAL ENCOUNTER (OUTPATIENT)
Dept: INFUSION CENTER | Facility: HOSPITAL | Age: 47
Discharge: HOME | End: 2022-06-02
Attending: INTERNAL MEDICINE
Payer: COMMERCIAL

## 2022-06-02 VITALS
HEIGHT: 68 IN | BODY MASS INDEX: 30.81 KG/M2 | HEART RATE: 75 BPM | DIASTOLIC BLOOD PRESSURE: 95 MMHG | RESPIRATION RATE: 16 BRPM | WEIGHT: 203.26 LBS | TEMPERATURE: 98.7 F | SYSTOLIC BLOOD PRESSURE: 152 MMHG

## 2022-06-02 DIAGNOSIS — T45.1X5A CHEMOTHERAPY INDUCED NEUTROPENIA (HCC): ICD-10-CM

## 2022-06-02 DIAGNOSIS — F41.9 ANXIETY: Primary | ICD-10-CM

## 2022-06-02 DIAGNOSIS — D70.1 CHEMOTHERAPY INDUCED NEUTROPENIA (HCC): ICD-10-CM

## 2022-06-02 DIAGNOSIS — C83.32 DIFFUSE LARGE B-CELL LYMPHOMA OF INTRATHORACIC LYMPH NODES (HCC): ICD-10-CM

## 2022-06-02 PROCEDURE — 96413 CHEMO IV INFUSION 1 HR: CPT

## 2022-06-02 PROCEDURE — 96417 CHEMO IV INFUS EACH ADDL SEQ: CPT

## 2022-06-02 PROCEDURE — 96415 CHEMO IV INFUSION ADDL HR: CPT

## 2022-06-02 PROCEDURE — 96377 APPLICATON ON-BODY INJECTOR: CPT

## 2022-06-02 PROCEDURE — 96367 TX/PROPH/DG ADDL SEQ IV INF: CPT

## 2022-06-02 RX ORDER — ACETAMINOPHEN 325 MG/1
650 TABLET ORAL ONCE
Status: CANCELLED | OUTPATIENT
Start: 2022-06-02

## 2022-06-02 RX ORDER — ACETAMINOPHEN 325 MG/1
650 TABLET ORAL ONCE
Status: COMPLETED | OUTPATIENT
Start: 2022-06-02 | End: 2022-06-02

## 2022-06-02 RX ORDER — SODIUM CHLORIDE 9 MG/ML
20 INJECTION, SOLUTION INTRAVENOUS ONCE
Status: COMPLETED | OUTPATIENT
Start: 2022-06-02 | End: 2022-06-02

## 2022-06-02 RX ORDER — SODIUM CHLORIDE 9 MG/ML
20 INJECTION, SOLUTION INTRAVENOUS ONCE
Status: CANCELLED | OUTPATIENT
Start: 2022-06-02

## 2022-06-02 RX ADMIN — DEXAMETHASONE SODIUM PHOSPHATE: 10 INJECTION, SOLUTION INTRAMUSCULAR; INTRAVENOUS at 09:20

## 2022-06-02 RX ADMIN — DIPHENHYDRAMINE HYDROCHLORIDE 25 MG: 50 INJECTION, SOLUTION INTRAMUSCULAR; INTRAVENOUS at 08:50

## 2022-06-02 RX ADMIN — ACETAMINOPHEN 650 MG: 325 TABLET ORAL at 08:50

## 2022-06-02 RX ADMIN — POLATUZUMAB VEDOTIN 166 MG: 140 INJECTION, POWDER, LYOPHILIZED, FOR SOLUTION INTRAVENOUS at 12:52

## 2022-06-02 RX ADMIN — SODIUM CHLORIDE 772.6 MG: 0.9 INJECTION, SOLUTION INTRAVENOUS at 09:59

## 2022-06-02 RX ADMIN — PEGFILGRASTIM 6 MG: KIT SUBCUTANEOUS at 13:33

## 2022-06-02 RX ADMIN — SODIUM CHLORIDE 20 ML/HR: 9 INJECTION, SOLUTION INTRAVENOUS at 08:50

## 2022-06-02 NOTE — PROGRESS NOTES
Patient tolerated IV chemotherapy without issues  Neulasta Onpro placed on L arm with green light blinking  Educated that he can take device off of arm at 5:30pm tomorrow  Next appointment confirmed, AVS declined

## 2022-06-15 ENCOUNTER — HOSPITAL ENCOUNTER (OUTPATIENT)
Dept: RADIOLOGY | Age: 47
Discharge: HOME | End: 2022-06-15
Payer: COMMERCIAL

## 2022-06-15 DIAGNOSIS — C83.38 DIFFUSE LARGE B-CELL LYMPHOMA OF LYMPH NODES OF MULTIPLE REGIONS (HCC): ICD-10-CM

## 2022-06-15 LAB — GLUCOSE SERPL-MCNC: 142 MG/DL (ref 65–140)

## 2022-06-15 PROCEDURE — 82948 REAGENT STRIP/BLOOD GLUCOSE: CPT

## 2022-06-15 PROCEDURE — G1004 CDSM NDSC: HCPCS

## 2022-06-15 PROCEDURE — 78815 PET IMAGE W/CT SKULL-THIGH: CPT

## 2022-06-15 PROCEDURE — A9552 F18 FDG: HCPCS

## 2022-06-16 RX ORDER — SODIUM CHLORIDE 9 MG/ML
20 INJECTION, SOLUTION INTRAVENOUS ONCE
Status: CANCELLED | OUTPATIENT
Start: 2022-06-23

## 2022-06-16 RX ORDER — ACETAMINOPHEN 325 MG/1
650 TABLET ORAL ONCE
Status: CANCELLED | OUTPATIENT
Start: 2022-06-23

## 2022-09-06 ENCOUNTER — HOSPITAL ENCOUNTER (OUTPATIENT)
Dept: RADIOLOGY | Age: 47
Discharge: HOME | End: 2022-09-06
Payer: COMMERCIAL

## 2022-09-06 DIAGNOSIS — R22.1 NECK SWELLING: ICD-10-CM

## 2022-09-06 PROCEDURE — 76536 US EXAM OF HEAD AND NECK: CPT

## 2022-10-12 DIAGNOSIS — C83.32 DIFFUSE LARGE B-CELL LYMPHOMA OF INTRATHORACIC LYMPH NODES (HCC): ICD-10-CM

## 2022-10-12 DIAGNOSIS — C83.32 DIFFUSE LARGE B-CELL LYMPHOMA OF INTRATHORACIC LYMPH NODES (HCC): Primary | ICD-10-CM

## 2022-10-12 PROCEDURE — U0005 INFEC AGEN DETEC AMPLI PROBE: HCPCS | Performed by: INTERNAL MEDICINE

## 2022-10-12 PROCEDURE — U0003 INFECTIOUS AGENT DETECTION BY NUCLEIC ACID (DNA OR RNA); SEVERE ACUTE RESPIRATORY SYNDROME CORONAVIRUS 2 (SARS-COV-2) (CORONAVIRUS DISEASE [COVID-19]), AMPLIFIED PROBE TECHNIQUE, MAKING USE OF HIGH THROUGHPUT TECHNOLOGIES AS DESCRIBED BY CMS-2020-01-R: HCPCS | Performed by: INTERNAL MEDICINE

## 2022-10-12 NOTE — PROGRESS NOTES
Received call from pt requesting pre admission covid test  Pt states lab will not accept order he has from [facility]  Order placed  Pt states plan is for 10/15 admission to [facility]

## 2022-10-13 LAB — SARS-COV-2 RNA RESP QL NAA+PROBE: NEGATIVE

## 2022-10-20 ENCOUNTER — HOSPITAL ENCOUNTER (OUTPATIENT)
Dept: INFUSION CENTER | Facility: CLINIC | Age: 47
Discharge: HOME | End: 2022-10-20
Payer: COMMERCIAL

## 2022-10-20 ENCOUNTER — LAB REQUISITION (OUTPATIENT)
Dept: LAB | Facility: HOSPITAL | Age: 47
End: 2022-10-20
Payer: COMMERCIAL

## 2022-10-20 DIAGNOSIS — C83.32 DIFFUSE LARGE B-CELL LYMPHOMA OF INTRATHORACIC LYMPH NODES (HCC): Primary | ICD-10-CM

## 2022-10-20 DIAGNOSIS — C83.38 DIFFUSE LARGE B-CELL LYMPHOMA, LYMPH NODES OF MULTIPLE SITES (HCC): ICD-10-CM

## 2022-10-20 LAB
ALBUMIN SERPL BCP-MCNC: 4.7 G/DL (ref 3.5–5)
ALP SERPL-CCNC: 96 U/L (ref 34–104)
ALT SERPL W P-5'-P-CCNC: 47 U/L (ref 7–52)
ANION GAP SERPL CALCULATED.3IONS-SCNC: 8 MMOL/L (ref 4–13)
AST SERPL W P-5'-P-CCNC: 21 U/L (ref 13–39)
BASOPHILS # BLD MANUAL: 0 THOUSAND/UL (ref 0–0.1)
BASOPHILS NFR MAR MANUAL: 0 % (ref 0–1)
BILIRUB SERPL-MCNC: 0.92 MG/DL (ref 0.2–1)
BUN SERPL-MCNC: 26 MG/DL (ref 5–25)
CALCIUM SERPL-MCNC: 9.7 MG/DL (ref 8.4–10.2)
CHLORIDE SERPL-SCNC: 98 MMOL/L (ref 96–108)
CO2 SERPL-SCNC: 29 MMOL/L (ref 21–32)
CREAT SERPL-MCNC: 0.79 MG/DL (ref 0.6–1.3)
EOSINOPHIL # BLD MANUAL: 0.15 THOUSAND/UL (ref 0–0.4)
EOSINOPHIL NFR BLD MANUAL: 1 % (ref 0–6)
ERYTHROCYTE [DISTWIDTH] IN BLOOD BY AUTOMATED COUNT: 12.2 % (ref 11.6–15.1)
GFR SERPL CREATININE-BSD FRML MDRD: 108 ML/MIN/1.73SQ M
GLUCOSE SERPL-MCNC: 170 MG/DL (ref 65–140)
HCT VFR BLD AUTO: 46.5 % (ref 36.5–49.3)
HGB BLD-MCNC: 15.6 G/DL (ref 12–17)
LYMPHOCYTES # BLD AUTO: 0.61 THOUSAND/UL (ref 0.6–4.47)
LYMPHOCYTES # BLD AUTO: 4 % (ref 14–44)
MAGNESIUM SERPL-MCNC: 2.1 MG/DL (ref 1.9–2.7)
MCH RBC QN AUTO: 30.2 PG (ref 26.8–34.3)
MCHC RBC AUTO-ENTMCNC: 33.5 G/DL (ref 31.4–37.4)
MCV RBC AUTO: 90 FL (ref 82–98)
MONOCYTES # BLD AUTO: 0.3 THOUSAND/UL (ref 0–1.22)
MONOCYTES NFR BLD: 2 % (ref 4–12)
NEUTROPHILS # BLD MANUAL: 14.15 THOUSAND/UL (ref 1.85–7.62)
NEUTS BAND NFR BLD MANUAL: 4 % (ref 0–8)
NEUTS SEG NFR BLD AUTO: 89 % (ref 43–75)
PLATELET # BLD AUTO: 205 THOUSANDS/UL (ref 149–390)
PLATELET BLD QL SMEAR: ADEQUATE
PMV BLD AUTO: 9.7 FL (ref 8.9–12.7)
POTASSIUM SERPL-SCNC: 3.8 MMOL/L (ref 3.5–5.3)
PROT SERPL-MCNC: 7 G/DL (ref 6.4–8.4)
RBC # BLD AUTO: 5.16 MILLION/UL (ref 3.88–5.62)
RBC MORPH BLD: NORMAL
SODIUM SERPL-SCNC: 135 MMOL/L (ref 135–147)
WBC # BLD AUTO: 15.21 THOUSAND/UL (ref 4.31–10.16)

## 2022-10-20 PROCEDURE — 83735 ASSAY OF MAGNESIUM: CPT | Performed by: INTERNAL MEDICINE

## 2022-10-20 PROCEDURE — 85007 BL SMEAR W/DIFF WBC COUNT: CPT | Performed by: INTERNAL MEDICINE

## 2022-10-20 PROCEDURE — 85027 COMPLETE CBC AUTOMATED: CPT | Performed by: INTERNAL MEDICINE

## 2022-10-20 PROCEDURE — 80053 COMPREHEN METABOLIC PANEL: CPT | Performed by: INTERNAL MEDICINE

## 2022-10-24 ENCOUNTER — HOSPITAL ENCOUNTER (OUTPATIENT)
Dept: INFUSION CENTER | Facility: CLINIC | Age: 47
Discharge: HOME | End: 2022-10-24

## 2022-10-24 ENCOUNTER — LAB REQUISITION (OUTPATIENT)
Dept: LAB | Facility: HOSPITAL | Age: 47
End: 2022-10-24
Payer: COMMERCIAL

## 2022-10-24 DIAGNOSIS — C83.38 DIFFUSE LARGE B-CELL LYMPHOMA, LYMPH NODES OF MULTIPLE SITES (HCC): ICD-10-CM

## 2022-10-24 DIAGNOSIS — C83.32 DIFFUSE LARGE B-CELL LYMPHOMA OF INTRATHORACIC LYMPH NODES (HCC): Primary | ICD-10-CM

## 2022-10-24 LAB
ALBUMIN SERPL BCP-MCNC: 4.7 G/DL (ref 3.5–5)
ALP SERPL-CCNC: 105 U/L (ref 34–104)
ALT SERPL W P-5'-P-CCNC: 35 U/L (ref 7–52)
ANION GAP SERPL CALCULATED.3IONS-SCNC: 8 MMOL/L (ref 4–13)
AST SERPL W P-5'-P-CCNC: 16 U/L (ref 13–39)
BASOPHILS # BLD MANUAL: 0.01 THOUSAND/UL (ref 0–0.1)
BASOPHILS NFR MAR MANUAL: 2 % (ref 0–1)
BILIRUB SERPL-MCNC: 0.45 MG/DL (ref 0.2–1)
BUN SERPL-MCNC: 13 MG/DL (ref 5–25)
CALCIUM SERPL-MCNC: 9.6 MG/DL (ref 8.4–10.2)
CHLORIDE SERPL-SCNC: 101 MMOL/L (ref 96–108)
CO2 SERPL-SCNC: 27 MMOL/L (ref 21–32)
CREAT SERPL-MCNC: 0.65 MG/DL (ref 0.6–1.3)
EOSINOPHIL # BLD MANUAL: 0.12 THOUSAND/UL (ref 0–0.4)
EOSINOPHIL NFR BLD MANUAL: 16 % (ref 0–6)
GFR SERPL CREATININE-BSD FRML MDRD: 117 ML/MIN/1.73SQ M
GLUCOSE SERPL-MCNC: 85 MG/DL (ref 65–140)
LYMPHOCYTES # BLD AUTO: 0.25 THOUSAND/UL (ref 0.6–4.47)
LYMPHOCYTES # BLD AUTO: 34 % (ref 14–44)
MAGNESIUM SERPL-MCNC: 2.1 MG/DL (ref 1.9–2.7)
MONOCYTES # BLD AUTO: 0.25 THOUSAND/UL (ref 0–1.22)
MONOCYTES NFR BLD: 34 % (ref 4–12)
NEUTROPHILS # BLD MANUAL: 0.1 THOUSAND/UL (ref 1.85–7.62)
NEUTS SEG NFR BLD AUTO: 14 % (ref 43–75)
PLATELET BLD QL SMEAR: ABNORMAL
POTASSIUM SERPL-SCNC: 3.9 MMOL/L (ref 3.5–5.3)
PROT SERPL-MCNC: 7 G/DL (ref 6.4–8.4)
RBC MORPH BLD: NORMAL
SODIUM SERPL-SCNC: 136 MMOL/L (ref 135–147)

## 2022-10-24 PROCEDURE — 80053 COMPREHEN METABOLIC PANEL: CPT | Performed by: INTERNAL MEDICINE

## 2022-10-24 PROCEDURE — 85007 BL SMEAR W/DIFF WBC COUNT: CPT | Performed by: INTERNAL MEDICINE

## 2022-10-24 PROCEDURE — 85027 COMPLETE CBC AUTOMATED: CPT | Performed by: INTERNAL MEDICINE

## 2022-10-24 PROCEDURE — 83735 ASSAY OF MAGNESIUM: CPT | Performed by: INTERNAL MEDICINE

## 2022-10-24 NOTE — PROGRESS NOTES
Patient presents today for lab work  Patient offers no complaints  Port accessed without incident, excellent blood return noted  Labs drawn as per order  Port flushed and de-accessed as per protocol  Next appointment confirmed  AVS declined

## 2022-10-25 ENCOUNTER — TELEPHONE (OUTPATIENT)
Dept: HEMATOLOGY ONCOLOGY | Facility: CLINIC | Age: 47
End: 2022-10-25

## 2022-10-25 LAB
ERYTHROCYTE [DISTWIDTH] IN BLOOD BY AUTOMATED COUNT: 11.9 % (ref 11.6–15.1)
HCT VFR BLD AUTO: 39.9 % (ref 36.5–49.3)
HGB BLD-MCNC: 14.1 G/DL (ref 12–17)
MCH RBC QN AUTO: 30.5 PG (ref 26.8–34.3)
MCHC RBC AUTO-ENTMCNC: 35.3 G/DL (ref 31.4–37.4)
MCV RBC AUTO: 86 FL (ref 82–98)
PLATELET # BLD AUTO: 108 THOUSANDS/UL (ref 149–390)
PMV BLD AUTO: 10 FL (ref 8.9–12.7)
RBC # BLD AUTO: 4.63 MILLION/UL (ref 3.88–5.62)
WBC # BLD AUTO: 0.73 THOUSAND/UL (ref 4.31–10.16)

## 2022-10-25 NOTE — TELEPHONE ENCOUNTER
CALL RETURN FORM   Reason for patient call? Patient wants to know results from his blood work  Wants a call right away   Patient's primary oncologist? Dr Deborah Duarte     Name of person the patient was calling for? [place]    Any additional information to add, if applicable? N/A   Informed patient that the message will be forwarded to the team and someone will get back to them as soon as possible    Did you relay this information to the patient?  Yes

## 2022-10-27 ENCOUNTER — LAB REQUISITION (OUTPATIENT)
Dept: LAB | Facility: HOSPITAL | Age: 47
End: 2022-10-27
Payer: COMMERCIAL

## 2022-10-27 ENCOUNTER — HOSPITAL ENCOUNTER (OUTPATIENT)
Dept: INFUSION CENTER | Facility: CLINIC | Age: 47
Discharge: HOME | End: 2022-10-27

## 2022-10-27 DIAGNOSIS — C83.38 DIFFUSE LARGE B-CELL LYMPHOMA, LYMPH NODES OF MULTIPLE SITES (HCC): ICD-10-CM

## 2022-10-27 DIAGNOSIS — C83.32 DIFFUSE LARGE B-CELL LYMPHOMA OF INTRATHORACIC LYMPH NODES (HCC): Primary | ICD-10-CM

## 2022-10-27 LAB
ALBUMIN SERPL BCP-MCNC: 4.5 G/DL (ref 3.5–5)
ALP SERPL-CCNC: 118 U/L (ref 34–104)
ALT SERPL W P-5'-P-CCNC: 56 U/L (ref 7–52)
ANION GAP SERPL CALCULATED.3IONS-SCNC: 7 MMOL/L (ref 4–13)
AST SERPL W P-5'-P-CCNC: 28 U/L (ref 13–39)
BASOPHILS # BLD MANUAL: 0 THOUSAND/UL (ref 0–0.1)
BASOPHILS NFR MAR MANUAL: 0 % (ref 0–1)
BILIRUB SERPL-MCNC: 0.35 MG/DL (ref 0.2–1)
BUN SERPL-MCNC: 18 MG/DL (ref 5–25)
CALCIUM SERPL-MCNC: 9.7 MG/DL (ref 8.4–10.2)
CHLORIDE SERPL-SCNC: 102 MMOL/L (ref 96–108)
CO2 SERPL-SCNC: 27 MMOL/L (ref 21–32)
CREAT SERPL-MCNC: 0.7 MG/DL (ref 0.6–1.3)
DOHLE BOD BLD QL SMEAR: PRESENT
EOSINOPHIL # BLD MANUAL: 0 THOUSAND/UL (ref 0–0.4)
EOSINOPHIL NFR BLD MANUAL: 0 % (ref 0–6)
ERYTHROCYTE [DISTWIDTH] IN BLOOD BY AUTOMATED COUNT: 12.4 % (ref 11.6–15.1)
GFR SERPL CREATININE-BSD FRML MDRD: 113 ML/MIN/1.73SQ M
GLUCOSE SERPL-MCNC: 99 MG/DL (ref 65–140)
HCT VFR BLD AUTO: 38.7 % (ref 36.5–49.3)
HGB BLD-MCNC: 13.3 G/DL (ref 12–17)
LG PLATELETS BLD QL SMEAR: PRESENT
LYMPHOCYTES # BLD AUTO: 0.84 THOUSAND/UL (ref 0.6–4.47)
LYMPHOCYTES # BLD AUTO: 8 % (ref 14–44)
MAGNESIUM SERPL-MCNC: 2 MG/DL (ref 1.9–2.7)
MCH RBC QN AUTO: 30.3 PG (ref 26.8–34.3)
MCHC RBC AUTO-ENTMCNC: 34.4 G/DL (ref 31.4–37.4)
MCV RBC AUTO: 88 FL (ref 82–98)
METAMYELOCYTES NFR BLD MANUAL: 3 % (ref 0–1)
MONOCYTES # BLD AUTO: 1.37 THOUSAND/UL (ref 0–1.22)
MONOCYTES NFR BLD: 13 % (ref 4–12)
MYELOCYTES NFR BLD MANUAL: 1 % (ref 0–1)
NEUTROPHILS # BLD MANUAL: 7.9 THOUSAND/UL (ref 1.85–7.62)
NEUTS BAND NFR BLD MANUAL: 6 % (ref 0–8)
NEUTS SEG NFR BLD AUTO: 69 % (ref 43–75)
NRBC BLD AUTO-RTO: 1 /100 WBC (ref 0–2)
PLATELET # BLD AUTO: 91 THOUSANDS/UL (ref 149–390)
PLATELET BLD QL SMEAR: ABNORMAL
PMV BLD AUTO: 9.8 FL (ref 8.9–12.7)
POTASSIUM SERPL-SCNC: 4 MMOL/L (ref 3.5–5.3)
PROT SERPL-MCNC: 6.9 G/DL (ref 6.4–8.4)
RBC # BLD AUTO: 4.39 MILLION/UL (ref 3.88–5.62)
RBC MORPH BLD: NORMAL
SODIUM SERPL-SCNC: 136 MMOL/L (ref 135–147)
WBC # BLD AUTO: 10.54 THOUSAND/UL (ref 4.31–10.16)

## 2022-10-27 PROCEDURE — 85007 BL SMEAR W/DIFF WBC COUNT: CPT | Performed by: INTERNAL MEDICINE

## 2022-10-27 PROCEDURE — 83735 ASSAY OF MAGNESIUM: CPT | Performed by: INTERNAL MEDICINE

## 2022-10-27 PROCEDURE — 80053 COMPREHEN METABOLIC PANEL: CPT | Performed by: INTERNAL MEDICINE

## 2022-10-27 PROCEDURE — 85027 COMPLETE CBC AUTOMATED: CPT | Performed by: INTERNAL MEDICINE

## 2022-10-31 ENCOUNTER — HOSPITAL ENCOUNTER (OUTPATIENT)
Dept: INFUSION CENTER | Facility: CLINIC | Age: 47
Discharge: HOME | End: 2022-10-31

## 2022-10-31 ENCOUNTER — LAB REQUISITION (OUTPATIENT)
Dept: LAB | Facility: HOSPITAL | Age: 47
End: 2022-10-31

## 2022-10-31 DIAGNOSIS — T45.1X5A CHEMOTHERAPY INDUCED NAUSEA AND VOMITING: ICD-10-CM

## 2022-10-31 DIAGNOSIS — C83.38 DIFFUSE LARGE B-CELL LYMPHOMA, LYMPH NODES OF MULTIPLE SITES (HCC): ICD-10-CM

## 2022-10-31 DIAGNOSIS — C83.32 DIFFUSE LARGE B-CELL LYMPHOMA OF INTRATHORACIC LYMPH NODES (HCC): Primary | ICD-10-CM

## 2022-10-31 DIAGNOSIS — R11.2 CHEMOTHERAPY INDUCED NAUSEA AND VOMITING: ICD-10-CM

## 2022-10-31 LAB
ALBUMIN SERPL BCP-MCNC: 4.5 G/DL (ref 3.5–5)
ALP SERPL-CCNC: 117 U/L (ref 34–104)
ALT SERPL W P-5'-P-CCNC: 36 U/L (ref 7–52)
ANION GAP SERPL CALCULATED.3IONS-SCNC: 9 MMOL/L (ref 4–13)
AST SERPL W P-5'-P-CCNC: 17 U/L (ref 13–39)
BASOPHILS # BLD MANUAL: 0 THOUSAND/UL (ref 0–0.1)
BASOPHILS NFR MAR MANUAL: 0 % (ref 0–1)
BILIRUB SERPL-MCNC: 0.23 MG/DL (ref 0.2–1)
BUN SERPL-MCNC: 16 MG/DL (ref 5–25)
CALCIUM SERPL-MCNC: 9.6 MG/DL (ref 8.4–10.2)
CHLORIDE SERPL-SCNC: 103 MMOL/L (ref 96–108)
CO2 SERPL-SCNC: 26 MMOL/L (ref 21–32)
CREAT SERPL-MCNC: 0.65 MG/DL (ref 0.6–1.3)
EOSINOPHIL # BLD MANUAL: 0.11 THOUSAND/UL (ref 0–0.4)
EOSINOPHIL NFR BLD MANUAL: 1 % (ref 0–6)
ERYTHROCYTE [DISTWIDTH] IN BLOOD BY AUTOMATED COUNT: 12.9 % (ref 11.6–15.1)
GFR SERPL CREATININE-BSD FRML MDRD: 117 ML/MIN/1.73SQ M
GLUCOSE SERPL-MCNC: 111 MG/DL (ref 65–140)
HCT VFR BLD AUTO: 38.4 % (ref 36.5–49.3)
HGB BLD-MCNC: 13.2 G/DL (ref 12–17)
LYMPHOCYTES # BLD AUTO: 1.26 THOUSAND/UL (ref 0.6–4.47)
LYMPHOCYTES # BLD AUTO: 11 % (ref 14–44)
MAGNESIUM SERPL-MCNC: 1.9 MG/DL (ref 1.9–2.7)
MCH RBC QN AUTO: 30.3 PG (ref 26.8–34.3)
MCHC RBC AUTO-ENTMCNC: 34.4 G/DL (ref 31.4–37.4)
MCV RBC AUTO: 88 FL (ref 82–98)
MONOCYTES # BLD AUTO: 1.03 THOUSAND/UL (ref 0–1.22)
MONOCYTES NFR BLD: 9 % (ref 4–12)
NEUTROPHILS # BLD MANUAL: 9.04 THOUSAND/UL (ref 1.85–7.62)
NEUTS BAND NFR BLD MANUAL: 8 % (ref 0–8)
NEUTS SEG NFR BLD AUTO: 71 % (ref 43–75)
PLATELET # BLD AUTO: 146 THOUSANDS/UL (ref 149–390)
PLATELET BLD QL SMEAR: ADEQUATE
PMV BLD AUTO: 9.7 FL (ref 8.9–12.7)
POTASSIUM SERPL-SCNC: 3.8 MMOL/L (ref 3.5–5.3)
PROT SERPL-MCNC: 6.9 G/DL (ref 6.4–8.4)
RBC # BLD AUTO: 4.36 MILLION/UL (ref 3.88–5.62)
RBC MORPH BLD: NORMAL
SODIUM SERPL-SCNC: 138 MMOL/L (ref 135–147)
WBC # BLD AUTO: 11.44 THOUSAND/UL (ref 4.31–10.16)

## 2022-10-31 NOTE — PROGRESS NOTES
Patient arrived for lab draw from port  Port accessed, brisk blood return noted  Labs drawn and sent  Port flushed and de accessed  Pt aware of next appt   Declined AVS

## 2022-11-01 ENCOUNTER — APPOINTMENT (OUTPATIENT)
Dept: URGENT CARE | Age: 47
End: 2022-11-01

## 2022-11-01 DIAGNOSIS — C83.32 DIFFUSE LARGE B-CELL LYMPHOMA OF INTRATHORACIC LYMPH NODES (HCC): ICD-10-CM

## 2022-11-02 LAB — SARS-COV-2 RNA RESP QL NAA+PROBE: NEGATIVE

## 2022-11-03 ENCOUNTER — HOSPITAL ENCOUNTER (OUTPATIENT)
Dept: INFUSION CENTER | Facility: CLINIC | Age: 47
Discharge: HOME | End: 2022-11-03

## 2022-11-03 ENCOUNTER — LAB REQUISITION (OUTPATIENT)
Dept: LAB | Facility: HOSPITAL | Age: 47
End: 2022-11-03

## 2022-11-03 DIAGNOSIS — C83.32 DIFFUSE LARGE B-CELL LYMPHOMA OF INTRATHORACIC LYMPH NODES (HCC): Primary | ICD-10-CM

## 2022-11-03 DIAGNOSIS — C83.38 DIFFUSE LARGE B-CELL LYMPHOMA, LYMPH NODES OF MULTIPLE SITES (HCC): ICD-10-CM

## 2022-11-03 LAB
ALBUMIN SERPL BCP-MCNC: 4.5 G/DL (ref 3.5–5)
ALP SERPL-CCNC: 112 U/L (ref 34–104)
ALT SERPL W P-5'-P-CCNC: 37 U/L (ref 7–52)
ANION GAP SERPL CALCULATED.3IONS-SCNC: 7 MMOL/L (ref 4–13)
AST SERPL W P-5'-P-CCNC: 23 U/L (ref 13–39)
BASOPHILS # BLD MANUAL: 0 THOUSAND/UL (ref 0–0.1)
BASOPHILS NFR MAR MANUAL: 0 % (ref 0–1)
BILIRUB SERPL-MCNC: 0.32 MG/DL (ref 0.2–1)
BUN SERPL-MCNC: 14 MG/DL (ref 5–25)
CALCIUM SERPL-MCNC: 9.5 MG/DL (ref 8.4–10.2)
CHLORIDE SERPL-SCNC: 102 MMOL/L (ref 96–108)
CO2 SERPL-SCNC: 28 MMOL/L (ref 21–32)
CREAT SERPL-MCNC: 0.76 MG/DL (ref 0.6–1.3)
EOSINOPHIL # BLD MANUAL: 0 THOUSAND/UL (ref 0–0.4)
EOSINOPHIL NFR BLD MANUAL: 0 % (ref 0–6)
ERYTHROCYTE [DISTWIDTH] IN BLOOD BY AUTOMATED COUNT: 13.3 % (ref 11.6–15.1)
GFR SERPL CREATININE-BSD FRML MDRD: 110 ML/MIN/1.73SQ M
GLUCOSE SERPL-MCNC: 98 MG/DL (ref 65–140)
HCT VFR BLD AUTO: 37.7 % (ref 36.5–49.3)
HGB BLD-MCNC: 12.9 G/DL (ref 12–17)
LYMPHOCYTES # BLD AUTO: 0.65 THOUSAND/UL (ref 0.6–4.47)
LYMPHOCYTES # BLD AUTO: 6 % (ref 14–44)
MAGNESIUM SERPL-MCNC: 2.1 MG/DL (ref 1.9–2.7)
MCH RBC QN AUTO: 30.4 PG (ref 26.8–34.3)
MCHC RBC AUTO-ENTMCNC: 34.2 G/DL (ref 31.4–37.4)
MCV RBC AUTO: 89 FL (ref 82–98)
METAMYELOCYTES NFR BLD MANUAL: 1 % (ref 0–1)
MONOCYTES # BLD AUTO: 1.09 THOUSAND/UL (ref 0–1.22)
MONOCYTES NFR BLD: 10 % (ref 4–12)
NEUTROPHILS # BLD MANUAL: 9.01 THOUSAND/UL (ref 1.85–7.62)
NEUTS SEG NFR BLD AUTO: 83 % (ref 43–75)
PLATELET # BLD AUTO: 253 THOUSANDS/UL (ref 149–390)
PLATELET BLD QL SMEAR: ADEQUATE
PMV BLD AUTO: 9 FL (ref 8.9–12.7)
POTASSIUM SERPL-SCNC: 3.9 MMOL/L (ref 3.5–5.3)
PROT SERPL-MCNC: 6.9 G/DL (ref 6.4–8.4)
RBC # BLD AUTO: 4.25 MILLION/UL (ref 3.88–5.62)
RBC MORPH BLD: NORMAL
SODIUM SERPL-SCNC: 137 MMOL/L (ref 135–147)
WBC # BLD AUTO: 10.86 THOUSAND/UL (ref 4.31–10.16)

## 2022-11-07 ENCOUNTER — LAB REQUISITION (OUTPATIENT)
Dept: LAB | Facility: HOSPITAL | Age: 47
End: 2022-11-07

## 2022-11-07 ENCOUNTER — TELEPHONE (OUTPATIENT)
Dept: HEMATOLOGY ONCOLOGY | Facility: CLINIC | Age: 47
End: 2022-11-07

## 2022-11-07 ENCOUNTER — HOSPITAL ENCOUNTER (OUTPATIENT)
Dept: INFUSION CENTER | Facility: CLINIC | Age: 47
Discharge: HOME | End: 2022-11-07

## 2022-11-07 DIAGNOSIS — C83.32 DIFFUSE LARGE B-CELL LYMPHOMA OF INTRATHORACIC LYMPH NODES (HCC): Primary | ICD-10-CM

## 2022-11-07 DIAGNOSIS — C83.38 DIFFUSE LARGE B-CELL LYMPHOMA, LYMPH NODES OF MULTIPLE SITES (HCC): ICD-10-CM

## 2022-11-07 LAB
ALBUMIN SERPL BCP-MCNC: 4.1 G/DL (ref 3.5–5)
ALP SERPL-CCNC: 79 U/L (ref 34–104)
ALT SERPL W P-5'-P-CCNC: 74 U/L (ref 7–52)
ANION GAP SERPL CALCULATED.3IONS-SCNC: 5 MMOL/L (ref 4–13)
AST SERPL W P-5'-P-CCNC: 52 U/L (ref 13–39)
BASOPHILS # BLD MANUAL: 0 THOUSAND/UL (ref 0–0.1)
BASOPHILS NFR MAR MANUAL: 0 % (ref 0–1)
BILIRUB SERPL-MCNC: 0.44 MG/DL (ref 0.2–1)
BUN SERPL-MCNC: 13 MG/DL (ref 5–25)
CALCIUM SERPL-MCNC: 9.1 MG/DL (ref 8.4–10.2)
CHLORIDE SERPL-SCNC: 103 MMOL/L (ref 96–108)
CO2 SERPL-SCNC: 30 MMOL/L (ref 21–32)
CREAT SERPL-MCNC: 0.61 MG/DL (ref 0.6–1.3)
EOSINOPHIL # BLD MANUAL: 0 THOUSAND/UL (ref 0–0.4)
EOSINOPHIL NFR BLD MANUAL: 0 % (ref 0–6)
ERYTHROCYTE [DISTWIDTH] IN BLOOD BY AUTOMATED COUNT: 14.1 % (ref 11.6–15.1)
GFR SERPL CREATININE-BSD FRML MDRD: 120 ML/MIN/1.73SQ M
GLUCOSE SERPL-MCNC: 87 MG/DL (ref 65–140)
HCT VFR BLD AUTO: 38.4 % (ref 36.5–49.3)
HGB BLD-MCNC: 13.2 G/DL (ref 12–17)
LYMPHOCYTES # BLD AUTO: 0.68 THOUSAND/UL (ref 0.6–4.47)
LYMPHOCYTES # BLD AUTO: 8 % (ref 14–44)
MAGNESIUM SERPL-MCNC: 2 MG/DL (ref 1.9–2.7)
MCH RBC QN AUTO: 31.1 PG (ref 26.8–34.3)
MCHC RBC AUTO-ENTMCNC: 34.4 G/DL (ref 31.4–37.4)
MCV RBC AUTO: 90 FL (ref 82–98)
MONOCYTES # BLD AUTO: 0.09 THOUSAND/UL (ref 0–1.22)
MONOCYTES NFR BLD: 1 % (ref 4–12)
NEUTROPHILS # BLD MANUAL: 7.76 THOUSAND/UL (ref 1.85–7.62)
NEUTS BAND NFR BLD MANUAL: 1 % (ref 0–8)
NEUTS SEG NFR BLD AUTO: 90 % (ref 43–75)
PLATELET # BLD AUTO: 274 THOUSANDS/UL (ref 149–390)
PLATELET BLD QL SMEAR: ADEQUATE
PMV BLD AUTO: 8.8 FL (ref 8.9–12.7)
POTASSIUM SERPL-SCNC: 3.7 MMOL/L (ref 3.5–5.3)
PROT SERPL-MCNC: 6.2 G/DL (ref 6.4–8.4)
RBC # BLD AUTO: 4.25 MILLION/UL (ref 3.88–5.62)
RBC MORPH BLD: NORMAL
SODIUM SERPL-SCNC: 138 MMOL/L (ref 135–147)
WBC # BLD AUTO: 8.53 THOUSAND/UL (ref 4.31–10.16)

## 2022-11-07 NOTE — PROGRESS NOTES
Patient arrives to infusion center for lab draw today  Patient reports fatigue/nausea but that it usually resolves by the end of the week  R PAC accessed without issue, brisk blood return noted, labs collected and sent as per orders  Port flushed easily without resistance, deaccessed, bandaid in place  Patient aware of appt Thursday, declines AVS  Patient AAOx4 and ambulatory upon DC without gait disturbance noted

## 2022-11-07 NOTE — TELEPHONE ENCOUNTER
Received voicemail from Donovan farmer Seeley on behalf of Dr Janelle Vera office  Pt needs local Echo and PFT's prior to transplant  Will place orders

## 2022-11-10 ENCOUNTER — LAB REQUISITION (OUTPATIENT)
Dept: LAB | Facility: HOSPITAL | Age: 47
End: 2022-11-10

## 2022-11-10 ENCOUNTER — HOSPITAL ENCOUNTER (OUTPATIENT)
Dept: INFUSION CENTER | Facility: CLINIC | Age: 47
Discharge: HOME | End: 2022-11-10

## 2022-11-10 DIAGNOSIS — C83.38 DIFFUSE LARGE B-CELL LYMPHOMA, LYMPH NODES OF MULTIPLE SITES (HCC): ICD-10-CM

## 2022-11-10 DIAGNOSIS — C83.32 DIFFUSE LARGE B-CELL LYMPHOMA OF INTRATHORACIC LYMPH NODES (HCC): Primary | ICD-10-CM

## 2022-11-10 LAB
ALBUMIN SERPL BCP-MCNC: 4.8 G/DL (ref 3.5–5)
ALP SERPL-CCNC: 130 U/L (ref 34–104)
ALT SERPL W P-5'-P-CCNC: 75 U/L (ref 7–52)
ANION GAP SERPL CALCULATED.3IONS-SCNC: 8 MMOL/L (ref 4–13)
AST SERPL W P-5'-P-CCNC: 25 U/L (ref 13–39)
BASOPHILS # BLD MANUAL: 0.21 THOUSAND/UL (ref 0–0.1)
BASOPHILS NFR MAR MANUAL: 1 % (ref 0–1)
BILIRUB SERPL-MCNC: 0.63 MG/DL (ref 0.2–1)
BUN SERPL-MCNC: 23 MG/DL (ref 5–25)
CALCIUM SERPL-MCNC: 9.4 MG/DL (ref 8.4–10.2)
CHLORIDE SERPL-SCNC: 99 MMOL/L (ref 96–108)
CO2 SERPL-SCNC: 27 MMOL/L (ref 21–32)
CREAT SERPL-MCNC: 0.66 MG/DL (ref 0.6–1.3)
EOSINOPHIL # BLD MANUAL: 0 THOUSAND/UL (ref 0–0.4)
EOSINOPHIL NFR BLD MANUAL: 0 % (ref 0–6)
ERYTHROCYTE [DISTWIDTH] IN BLOOD BY AUTOMATED COUNT: 13.6 % (ref 11.6–15.1)
GFR SERPL CREATININE-BSD FRML MDRD: 116 ML/MIN/1.73SQ M
GLUCOSE SERPL-MCNC: 95 MG/DL (ref 65–140)
HCT VFR BLD AUTO: 38.4 % (ref 36.5–49.3)
HGB BLD-MCNC: 13 G/DL (ref 12–17)
LYMPHOCYTES # BLD AUTO: 0.42 THOUSAND/UL (ref 0.6–4.47)
LYMPHOCYTES # BLD AUTO: 2 % (ref 14–44)
MAGNESIUM SERPL-MCNC: 2.2 MG/DL (ref 1.9–2.7)
MCH RBC QN AUTO: 30.4 PG (ref 26.8–34.3)
MCHC RBC AUTO-ENTMCNC: 33.9 G/DL (ref 31.4–37.4)
MCV RBC AUTO: 90 FL (ref 82–98)
MONOCYTES # BLD AUTO: 0 THOUSAND/UL (ref 0–1.22)
MONOCYTES NFR BLD: 0 % (ref 4–12)
NEUTROPHILS # BLD MANUAL: 20.45 THOUSAND/UL (ref 1.85–7.62)
NEUTS BAND NFR BLD MANUAL: 1 % (ref 0–8)
NEUTS SEG NFR BLD AUTO: 96 % (ref 43–75)
PLATELET # BLD AUTO: 255 THOUSANDS/UL (ref 149–390)
PLATELET BLD QL SMEAR: ADEQUATE
PMV BLD AUTO: 9.5 FL (ref 8.9–12.7)
POTASSIUM SERPL-SCNC: 3.8 MMOL/L (ref 3.5–5.3)
PROT SERPL-MCNC: 7.1 G/DL (ref 6.4–8.4)
RBC # BLD AUTO: 4.28 MILLION/UL (ref 3.88–5.62)
RBC MORPH BLD: NORMAL
SODIUM SERPL-SCNC: 134 MMOL/L (ref 135–147)
WBC # BLD AUTO: 21.08 THOUSAND/UL (ref 4.31–10.16)

## 2022-11-10 NOTE — PROGRESS NOTES
Patient here for central labs  Patient offers no complaints at this time  Labs drawn per dr's orders   Next appointment confirmed and he declined avs

## 2022-11-14 ENCOUNTER — HOSPITAL ENCOUNTER (OUTPATIENT)
Dept: INFUSION CENTER | Facility: CLINIC | Age: 47
Discharge: HOME | End: 2022-11-14

## 2022-11-14 ENCOUNTER — LAB REQUISITION (OUTPATIENT)
Dept: LAB | Facility: HOSPITAL | Age: 47
End: 2022-11-14

## 2022-11-14 DIAGNOSIS — C83.32 DIFFUSE LARGE B-CELL LYMPHOMA OF INTRATHORACIC LYMPH NODES (HCC): Primary | ICD-10-CM

## 2022-11-14 DIAGNOSIS — C83.38 DIFFUSE LARGE B-CELL LYMPHOMA, LYMPH NODES OF MULTIPLE SITES (HCC): ICD-10-CM

## 2022-11-14 LAB
ALBUMIN SERPL BCP-MCNC: 4.7 G/DL (ref 3.5–5)
ALP SERPL-CCNC: 123 U/L (ref 34–104)
ALT SERPL W P-5'-P-CCNC: 45 U/L (ref 7–52)
ANION GAP SERPL CALCULATED.3IONS-SCNC: 8 MMOL/L (ref 4–13)
ANISOCYTOSIS BLD QL SMEAR: PRESENT
AST SERPL W P-5'-P-CCNC: 18 U/L (ref 13–39)
BASOPHILS # BLD MANUAL: 0.05 THOUSAND/UL (ref 0–0.1)
BASOPHILS NFR MAR MANUAL: 2 % (ref 0–1)
BILIRUB SERPL-MCNC: 0.47 MG/DL (ref 0.2–1)
BUN SERPL-MCNC: 12 MG/DL (ref 5–25)
CALCIUM SERPL-MCNC: 9.4 MG/DL (ref 8.4–10.2)
CHLORIDE SERPL-SCNC: 103 MMOL/L (ref 96–108)
CO2 SERPL-SCNC: 26 MMOL/L (ref 21–32)
CREAT SERPL-MCNC: 0.63 MG/DL (ref 0.6–1.3)
EOSINOPHIL # BLD MANUAL: 0.02 THOUSAND/UL (ref 0–0.4)
EOSINOPHIL NFR BLD MANUAL: 1 % (ref 0–6)
ERYTHROCYTE [DISTWIDTH] IN BLOOD BY AUTOMATED COUNT: 13.2 % (ref 11.6–15.1)
GFR SERPL CREATININE-BSD FRML MDRD: 119 ML/MIN/1.73SQ M
GLUCOSE SERPL-MCNC: 85 MG/DL (ref 65–140)
HCT VFR BLD AUTO: 35.6 % (ref 36.5–49.3)
HGB BLD-MCNC: 12.7 G/DL (ref 12–17)
LG PLATELETS BLD QL SMEAR: PRESENT
LYMPHOCYTES # BLD AUTO: 0.4 THOUSAND/UL (ref 0.6–4.47)
LYMPHOCYTES # BLD AUTO: 17 % (ref 14–44)
MAGNESIUM SERPL-MCNC: 2 MG/DL (ref 1.9–2.7)
MCH RBC QN AUTO: 32.2 PG (ref 26.8–34.3)
MCHC RBC AUTO-ENTMCNC: 35.7 G/DL (ref 31.4–37.4)
MCV RBC AUTO: 90 FL (ref 82–98)
MISCELLANEOUS LAB TEST RESULT: NORMAL
MONOCYTES # BLD AUTO: 0.71 THOUSAND/UL (ref 0–1.22)
MONOCYTES NFR BLD: 30 % (ref 4–12)
NEUTROPHILS # BLD MANUAL: 1.15 THOUSAND/UL (ref 1.85–7.62)
NEUTS BAND NFR BLD MANUAL: 11 % (ref 0–8)
NEUTS SEG NFR BLD AUTO: 38 % (ref 43–75)
NRBC BLD AUTO-RTO: 2 /100 WBC (ref 0–2)
PLATELET # BLD AUTO: 132 THOUSANDS/UL (ref 149–390)
PLATELET BLD QL SMEAR: ABNORMAL
PMV BLD AUTO: 10.2 FL (ref 8.9–12.7)
POLYCHROMASIA BLD QL SMEAR: PRESENT
POTASSIUM SERPL-SCNC: 3.8 MMOL/L (ref 3.5–5.3)
PROT SERPL-MCNC: 6.9 G/DL (ref 6.4–8.4)
RBC # BLD AUTO: 3.94 MILLION/UL (ref 3.88–5.62)
RBC MORPH BLD: PRESENT
SODIUM SERPL-SCNC: 137 MMOL/L (ref 135–147)
TOXIC GRANULES BLD QL SMEAR: PRESENT
VARIANT LYMPHS # BLD AUTO: 1 %
WBC # BLD AUTO: 2.35 THOUSAND/UL (ref 4.31–10.16)

## 2022-11-14 NOTE — PROGRESS NOTES
Patient here for port flush and blood work  Port accessed without issue, good blood return noted  Port deaccessed without issue  Pt aware of next appointment, declined AVS

## 2022-11-17 ENCOUNTER — LAB REQUISITION (OUTPATIENT)
Dept: LAB | Facility: HOSPITAL | Age: 47
End: 2022-11-17

## 2022-11-17 ENCOUNTER — HOSPITAL ENCOUNTER (OUTPATIENT)
Dept: PULMONOLOGY | Facility: HOSPITAL | Age: 47
End: 2022-11-17

## 2022-11-17 ENCOUNTER — HOSPITAL ENCOUNTER (OUTPATIENT)
Dept: INFUSION CENTER | Facility: CLINIC | Age: 47
Discharge: HOME | End: 2022-11-17

## 2022-11-17 ENCOUNTER — HOSPITAL ENCOUNTER (OUTPATIENT)
Dept: NON INVASIVE DIAGNOSTICS | Facility: HOSPITAL | Age: 47
Discharge: HOME | End: 2022-11-17

## 2022-11-17 VITALS
WEIGHT: 203 LBS | SYSTOLIC BLOOD PRESSURE: 152 MMHG | BODY MASS INDEX: 31.86 KG/M2 | HEIGHT: 67 IN | HEART RATE: 75 BPM | DIASTOLIC BLOOD PRESSURE: 95 MMHG

## 2022-11-17 DIAGNOSIS — C83.32 DIFFUSE LARGE B-CELL LYMPHOMA OF INTRATHORACIC LYMPH NODES (HCC): ICD-10-CM

## 2022-11-17 DIAGNOSIS — C83.38 DIFFUSE LARGE B-CELL LYMPHOMA, LYMPH NODES OF MULTIPLE SITES (HCC): ICD-10-CM

## 2022-11-17 DIAGNOSIS — C83.32 DIFFUSE LARGE B-CELL LYMPHOMA OF INTRATHORACIC LYMPH NODES (HCC): Primary | ICD-10-CM

## 2022-11-17 LAB
ALBUMIN SERPL BCP-MCNC: 4.6 G/DL (ref 3.5–5)
ALP SERPL-CCNC: 134 U/L (ref 34–104)
ALT SERPL W P-5'-P-CCNC: 39 U/L (ref 7–52)
ANION GAP SERPL CALCULATED.3IONS-SCNC: 7 MMOL/L (ref 4–13)
ANISOCYTOSIS BLD QL SMEAR: PRESENT
AORTIC ROOT: 3.2 CM
AST SERPL W P-5'-P-CCNC: 21 U/L (ref 13–39)
BASOPHILS # BLD MANUAL: 0 THOUSAND/UL (ref 0–0.1)
BASOPHILS NFR MAR MANUAL: 0 % (ref 0–1)
BILIRUB SERPL-MCNC: 0.28 MG/DL (ref 0.2–1)
BUN SERPL-MCNC: 14 MG/DL (ref 5–25)
CALCIUM SERPL-MCNC: 9.3 MG/DL (ref 8.4–10.2)
CHLORIDE SERPL-SCNC: 104 MMOL/L (ref 96–108)
CO2 SERPL-SCNC: 27 MMOL/L (ref 21–32)
CREAT SERPL-MCNC: 0.66 MG/DL (ref 0.6–1.3)
E WAVE DECELERATION TIME: 142 MS
EOSINOPHIL # BLD MANUAL: 0 THOUSAND/UL (ref 0–0.4)
EOSINOPHIL NFR BLD MANUAL: 0 % (ref 0–6)
ERYTHROCYTE [DISTWIDTH] IN BLOOD BY AUTOMATED COUNT: 15 % (ref 11.6–15.1)
FRACTIONAL SHORTENING: 34 (ref 28–44)
GFR SERPL CREATININE-BSD FRML MDRD: 116 ML/MIN/1.73SQ M
GLOBAL LONGITUIDAL STRAIN: -19 %
GLUCOSE SERPL-MCNC: 87 MG/DL (ref 65–140)
HCT VFR BLD AUTO: 36.3 % (ref 36.5–49.3)
HGB BLD-MCNC: 12.2 G/DL (ref 12–17)
INTERVENTRICULAR SEPTUM IN DIASTOLE (PARASTERNAL SHORT AXIS VIEW): 1 CM
INTERVENTRICULAR SEPTUM: 1 CM (ref 0.6–1.1)
LAAS-AP4: 20.8 CM2
LEFT ATRIUM SIZE: 3.5 CM
LEFT INTERNAL DIMENSION IN SYSTOLE: 3.3 CM (ref 2.1–4)
LEFT VENTRICULAR INTERNAL DIMENSION IN DIASTOLE: 5 CM (ref 3.5–6)
LEFT VENTRICULAR POSTERIOR WALL IN END DIASTOLE: 1 CM
LEFT VENTRICULAR STROKE VOLUME: 77 ML
LVSV (TEICH): 77 ML
LYMPHOCYTES # BLD AUTO: 1.06 THOUSAND/UL (ref 0.6–4.47)
LYMPHOCYTES # BLD AUTO: 5 % (ref 14–44)
MAGNESIUM SERPL-MCNC: 2.1 MG/DL (ref 1.9–2.7)
MCH RBC QN AUTO: 30.3 PG (ref 26.8–34.3)
MCHC RBC AUTO-ENTMCNC: 33.6 G/DL (ref 31.4–37.4)
MCV RBC AUTO: 90 FL (ref 82–98)
MONOCYTES # BLD AUTO: 1.91 THOUSAND/UL (ref 0–1.22)
MONOCYTES NFR BLD: 9 % (ref 4–12)
MV E'TISSUE VEL-SEP: 10 CM/S
MV PEAK A VEL: 0.56 M/S
MV PEAK E VEL: 73 CM/S
MV STENOSIS PRESSURE HALF TIME: 41 MS
MV VALVE AREA P 1/2 METHOD: 5.37
MYELOCYTES NFR BLD MANUAL: 4 % (ref 0–1)
NEUTROPHILS # BLD MANUAL: 17.4 THOUSAND/UL (ref 1.85–7.62)
NEUTS BAND NFR BLD MANUAL: 14 % (ref 0–8)
NEUTS SEG NFR BLD AUTO: 68 % (ref 43–75)
NRBC BLD AUTO-RTO: 2 /100 WBC (ref 0–2)
PLATELET # BLD AUTO: 98 THOUSANDS/UL (ref 149–390)
PLATELET BLD QL SMEAR: ABNORMAL
PMV BLD AUTO: 9.3 FL (ref 8.9–12.7)
POLYCHROMASIA BLD QL SMEAR: PRESENT
POTASSIUM SERPL-SCNC: 4.1 MMOL/L (ref 3.5–5.3)
PROT SERPL-MCNC: 6.9 G/DL (ref 6.4–8.4)
RBC # BLD AUTO: 4.02 MILLION/UL (ref 3.88–5.62)
RBC MORPH BLD: PRESENT
RIGHT ATRIAL 2D VOLUME: 38 ML
RIGHT ATRIUM AREA SYSTOLE A4C: 14.4 CM2
RIGHT VENTRICLE ID DIMENSION: 3.4 CM
SL CV LV EF: 65
SL CV PED ECHO LEFT VENTRICLE DIASTOLIC VOLUME (MOD BIPLANE) 2D: 120 ML
SL CV PED ECHO LEFT VENTRICLE SYSTOLIC VOLUME (MOD BIPLANE) 2D: 43 ML
SODIUM SERPL-SCNC: 138 MMOL/L (ref 135–147)
TOXIC GRANULES BLD QL SMEAR: PRESENT
TR MAX PG: 17 MMHG
TR PEAK VELOCITY: 2.1 M/S
TRICUSPID VALVE PEAK REGURGITATION VELOCITY: 2.05 M/S
WBC # BLD AUTO: 21.22 THOUSAND/UL (ref 4.31–10.16)

## 2022-11-17 NOTE — PROGRESS NOTES
Patient here for port flush and blood work  Port accessed without issue, good blood return noted  Port deaccessed without issue   Patient aware of next appointment, declined AVS

## 2022-11-21 ENCOUNTER — LAB REQUISITION (OUTPATIENT)
Dept: LAB | Facility: HOSPITAL | Age: 47
End: 2022-11-21

## 2022-11-21 ENCOUNTER — HOSPITAL ENCOUNTER (OUTPATIENT)
Dept: INFUSION CENTER | Facility: CLINIC | Age: 47
Discharge: HOME | End: 2022-11-21

## 2022-11-21 DIAGNOSIS — C83.32 DIFFUSE LARGE B-CELL LYMPHOMA OF INTRATHORACIC LYMPH NODES (HCC): Primary | ICD-10-CM

## 2022-11-21 DIAGNOSIS — C83.38 DIFFUSE LARGE B-CELL LYMPHOMA, LYMPH NODES OF MULTIPLE SITES (HCC): ICD-10-CM

## 2022-11-21 LAB
ALBUMIN SERPL BCP-MCNC: 4.7 G/DL (ref 3.5–5)
ALP SERPL-CCNC: 138 U/L (ref 34–104)
ALT SERPL W P-5'-P-CCNC: 37 U/L (ref 7–52)
ANION GAP SERPL CALCULATED.3IONS-SCNC: 8 MMOL/L (ref 4–13)
ANISOCYTOSIS BLD QL SMEAR: PRESENT
AST SERPL W P-5'-P-CCNC: 21 U/L (ref 13–39)
BASOPHILS # BLD MANUAL: 0 THOUSAND/UL (ref 0–0.1)
BASOPHILS NFR MAR MANUAL: 0 % (ref 0–1)
BILIRUB SERPL-MCNC: 0.31 MG/DL (ref 0.2–1)
BUN SERPL-MCNC: 16 MG/DL (ref 5–25)
CALCIUM SERPL-MCNC: 9.5 MG/DL (ref 8.4–10.2)
CHLORIDE SERPL-SCNC: 103 MMOL/L (ref 96–108)
CO2 SERPL-SCNC: 27 MMOL/L (ref 21–32)
CREAT SERPL-MCNC: 0.71 MG/DL (ref 0.6–1.3)
EOSINOPHIL # BLD MANUAL: 0.14 THOUSAND/UL (ref 0–0.4)
EOSINOPHIL NFR BLD MANUAL: 1 % (ref 0–6)
ERYTHROCYTE [DISTWIDTH] IN BLOOD BY AUTOMATED COUNT: 15.7 % (ref 11.6–15.1)
GFR SERPL CREATININE-BSD FRML MDRD: 113 ML/MIN/1.73SQ M
GLUCOSE SERPL-MCNC: 125 MG/DL (ref 65–140)
HCT VFR BLD AUTO: 37.7 % (ref 36.5–49.3)
HGB BLD-MCNC: 12.8 G/DL (ref 12–17)
LYMPHOCYTES # BLD AUTO: 0.71 THOUSAND/UL (ref 0.6–4.47)
LYMPHOCYTES # BLD AUTO: 5 % (ref 14–44)
MAGNESIUM SERPL-MCNC: 2.1 MG/DL (ref 1.9–2.7)
MCH RBC QN AUTO: 30.8 PG (ref 26.8–34.3)
MCHC RBC AUTO-ENTMCNC: 34 G/DL (ref 31.4–37.4)
MCV RBC AUTO: 91 FL (ref 82–98)
MONOCYTES # BLD AUTO: 0.57 THOUSAND/UL (ref 0–1.22)
MONOCYTES NFR BLD: 4 % (ref 4–12)
MYELOCYTES NFR BLD MANUAL: 5 % (ref 0–1)
NEUTROPHILS # BLD MANUAL: 12.14 THOUSAND/UL (ref 1.85–7.62)
NEUTS BAND NFR BLD MANUAL: 2 % (ref 0–8)
NEUTS SEG NFR BLD AUTO: 83 % (ref 43–75)
PLATELET # BLD AUTO: 159 THOUSANDS/UL (ref 149–390)
PLATELET BLD QL SMEAR: ADEQUATE
PMV BLD AUTO: 9.6 FL (ref 8.9–12.7)
POLYCHROMASIA BLD QL SMEAR: PRESENT
POTASSIUM SERPL-SCNC: 4.1 MMOL/L (ref 3.5–5.3)
PROT SERPL-MCNC: 7 G/DL (ref 6.4–8.4)
RBC # BLD AUTO: 4.15 MILLION/UL (ref 3.88–5.62)
RBC MORPH BLD: PRESENT
SODIUM SERPL-SCNC: 138 MMOL/L (ref 135–147)
WBC # BLD AUTO: 14.28 THOUSAND/UL (ref 4.31–10.16)

## 2022-11-21 NOTE — PROGRESS NOTES
Patient arrived for central line blood work ordered by [facility] dr Barrera Patrick no complaints  Blood work drawn with port access  Port flushed per protocol   Patient verified upcoming appointment and declined AVS

## 2022-11-22 DIAGNOSIS — C83.32 DIFFUSE LARGE B-CELL LYMPHOMA OF INTRATHORACIC LYMPH NODES (HCC): ICD-10-CM

## 2022-11-23 LAB — SARS-COV-2 RNA RESP QL NAA+PROBE: NEGATIVE

## 2022-12-01 ENCOUNTER — HOSPITAL ENCOUNTER (OUTPATIENT)
Dept: INFUSION CENTER | Facility: CLINIC | Age: 47
Discharge: HOME | End: 2022-12-01

## 2022-12-01 ENCOUNTER — LAB REQUISITION (OUTPATIENT)
Dept: LAB | Facility: HOSPITAL | Age: 47
End: 2022-12-01

## 2022-12-01 DIAGNOSIS — C83.32 DIFFUSE LARGE B-CELL LYMPHOMA OF INTRATHORACIC LYMPH NODES (HCC): Primary | ICD-10-CM

## 2022-12-01 DIAGNOSIS — C83.38 DIFFUSE LARGE B-CELL LYMPHOMA, LYMPH NODES OF MULTIPLE SITES (HCC): ICD-10-CM

## 2022-12-01 LAB
ALBUMIN SERPL BCP-MCNC: 4.6 G/DL (ref 3.5–5)
ALP SERPL-CCNC: 117 U/L (ref 34–104)
ALT SERPL W P-5'-P-CCNC: 59 U/L (ref 7–52)
ANION GAP SERPL CALCULATED.3IONS-SCNC: 8 MMOL/L (ref 4–13)
ANISOCYTOSIS BLD QL SMEAR: PRESENT
AST SERPL W P-5'-P-CCNC: 19 U/L (ref 13–39)
BASOPHILS # BLD MANUAL: 0 THOUSAND/UL (ref 0–0.1)
BASOPHILS NFR MAR MANUAL: 0 % (ref 0–1)
BILIRUB SERPL-MCNC: 0.64 MG/DL (ref 0.2–1)
BUN SERPL-MCNC: 20 MG/DL (ref 5–25)
CALCIUM SERPL-MCNC: 9.1 MG/DL (ref 8.4–10.2)
CHLORIDE SERPL-SCNC: 103 MMOL/L (ref 96–108)
CO2 SERPL-SCNC: 26 MMOL/L (ref 21–32)
CREAT SERPL-MCNC: 0.68 MG/DL (ref 0.6–1.3)
EOSINOPHIL # BLD MANUAL: 0 THOUSAND/UL (ref 0–0.4)
EOSINOPHIL NFR BLD MANUAL: 0 % (ref 0–6)
ERYTHROCYTE [DISTWIDTH] IN BLOOD BY AUTOMATED COUNT: 17.2 % (ref 11.6–15.1)
GFR SERPL CREATININE-BSD FRML MDRD: 115 ML/MIN/1.73SQ M
GLUCOSE SERPL-MCNC: 123 MG/DL (ref 65–140)
HCT VFR BLD AUTO: 34.3 % (ref 36.5–49.3)
HGB BLD-MCNC: 11.7 G/DL (ref 12–17)
LYMPHOCYTES # BLD AUTO: 0 % (ref 14–44)
LYMPHOCYTES # BLD AUTO: 0 THOUSAND/UL (ref 0.6–4.47)
MAGNESIUM SERPL-MCNC: 2.1 MG/DL (ref 1.9–2.7)
MCH RBC QN AUTO: 32.2 PG (ref 26.8–34.3)
MCHC RBC AUTO-ENTMCNC: 34.1 G/DL (ref 31.4–37.4)
MCV RBC AUTO: 95 FL (ref 82–98)
MONOCYTES # BLD AUTO: 0.23 THOUSAND/UL (ref 0–1.22)
MONOCYTES NFR BLD: 1 % (ref 4–12)
NEUTROPHILS # BLD MANUAL: 23.13 THOUSAND/UL (ref 1.85–7.62)
NEUTS HYPERSEG BLD QL SMEAR: PRESENT
NEUTS SEG NFR BLD AUTO: 99 % (ref 43–75)
PLATELET # BLD AUTO: 181 THOUSANDS/UL (ref 149–390)
PLATELET BLD QL SMEAR: ADEQUATE
PMV BLD AUTO: 9.5 FL (ref 8.9–12.7)
POTASSIUM SERPL-SCNC: 3.9 MMOL/L (ref 3.5–5.3)
PROT SERPL-MCNC: 6.6 G/DL (ref 6.4–8.4)
RBC # BLD AUTO: 3.63 MILLION/UL (ref 3.88–5.62)
RBC MORPH BLD: PRESENT
SODIUM SERPL-SCNC: 137 MMOL/L (ref 135–147)
WBC # BLD AUTO: 23.36 THOUSAND/UL (ref 4.31–10.16)

## 2022-12-05 ENCOUNTER — HOSPITAL ENCOUNTER (OUTPATIENT)
Dept: INFUSION CENTER | Facility: CLINIC | Age: 47
Discharge: HOME | End: 2022-12-05

## 2022-12-05 ENCOUNTER — LAB REQUISITION (OUTPATIENT)
Dept: LAB | Facility: HOSPITAL | Age: 47
End: 2022-12-05

## 2022-12-05 DIAGNOSIS — C83.38 DIFFUSE LARGE B-CELL LYMPHOMA, LYMPH NODES OF MULTIPLE SITES (HCC): ICD-10-CM

## 2022-12-05 DIAGNOSIS — C83.32 DIFFUSE LARGE B-CELL LYMPHOMA OF INTRATHORACIC LYMPH NODES (HCC): Primary | ICD-10-CM

## 2022-12-05 LAB
ALBUMIN SERPL BCP-MCNC: 4.6 G/DL (ref 3.5–5)
ALP SERPL-CCNC: 118 U/L (ref 34–104)
ALT SERPL W P-5'-P-CCNC: 35 U/L (ref 7–52)
ANION GAP SERPL CALCULATED.3IONS-SCNC: 7 MMOL/L (ref 4–13)
ANISOCYTOSIS BLD QL SMEAR: PRESENT
AST SERPL W P-5'-P-CCNC: 15 U/L (ref 13–39)
BASOPHILS # BLD MANUAL: 0 THOUSAND/UL (ref 0–0.1)
BASOPHILS NFR MAR MANUAL: 0 % (ref 0–1)
BILIRUB SERPL-MCNC: 0.55 MG/DL (ref 0.2–1)
BUN SERPL-MCNC: 16 MG/DL (ref 5–25)
CALCIUM SERPL-MCNC: 9.1 MG/DL (ref 8.4–10.2)
CHLORIDE SERPL-SCNC: 104 MMOL/L (ref 96–108)
CO2 SERPL-SCNC: 26 MMOL/L (ref 21–32)
CREAT SERPL-MCNC: 0.62 MG/DL (ref 0.6–1.3)
EOSINOPHIL # BLD MANUAL: 0 THOUSAND/UL (ref 0–0.4)
EOSINOPHIL NFR BLD MANUAL: 0 % (ref 0–6)
ERYTHROCYTE [DISTWIDTH] IN BLOOD BY AUTOMATED COUNT: 16.2 % (ref 11.6–15.1)
GFR SERPL CREATININE-BSD FRML MDRD: 119 ML/MIN/1.73SQ M
GLUCOSE SERPL-MCNC: 98 MG/DL (ref 65–140)
HCT VFR BLD AUTO: 30.7 % (ref 36.5–49.3)
HGB BLD-MCNC: 10.3 G/DL (ref 12–17)
LYMPHOCYTES # BLD AUTO: 0.83 THOUSAND/UL (ref 0.6–4.47)
LYMPHOCYTES # BLD AUTO: 56 % (ref 14–44)
MAGNESIUM SERPL-MCNC: 1.9 MG/DL (ref 1.9–2.7)
MCH RBC QN AUTO: 31.1 PG (ref 26.8–34.3)
MCHC RBC AUTO-ENTMCNC: 33.6 G/DL (ref 31.4–37.4)
MCV RBC AUTO: 93 FL (ref 82–98)
MONOCYTES # BLD AUTO: 0.12 THOUSAND/UL (ref 0–1.22)
MONOCYTES NFR BLD: 8 % (ref 4–12)
NEUTROPHILS # BLD MANUAL: 0.53 THOUSAND/UL (ref 1.85–7.62)
NEUTS BAND NFR BLD MANUAL: 3 % (ref 0–8)
NEUTS SEG NFR BLD AUTO: 33 % (ref 43–75)
NRBC BLD AUTO-RTO: 2 /100 WBC (ref 0–2)
PLATELET # BLD AUTO: 107 THOUSANDS/UL (ref 149–390)
PLATELET BLD QL SMEAR: ABNORMAL
PMV BLD AUTO: 10.7 FL (ref 8.9–12.7)
POLYCHROMASIA BLD QL SMEAR: PRESENT
POTASSIUM SERPL-SCNC: 4 MMOL/L (ref 3.5–5.3)
PROT SERPL-MCNC: 6.6 G/DL (ref 6.4–8.4)
RBC # BLD AUTO: 3.31 MILLION/UL (ref 3.88–5.62)
RBC MORPH BLD: PRESENT
SODIUM SERPL-SCNC: 137 MMOL/L (ref 135–147)
WBC # BLD AUTO: 1.48 THOUSAND/UL (ref 4.31–10.16)

## 2022-12-08 ENCOUNTER — LAB REQUISITION (OUTPATIENT)
Dept: LAB | Facility: HOSPITAL | Age: 47
End: 2022-12-08

## 2022-12-08 ENCOUNTER — HOSPITAL ENCOUNTER (OUTPATIENT)
Dept: INFUSION CENTER | Facility: CLINIC | Age: 47
Discharge: HOME | End: 2022-12-08

## 2022-12-08 DIAGNOSIS — C83.32 DIFFUSE LARGE B-CELL LYMPHOMA OF INTRATHORACIC LYMPH NODES (HCC): Primary | ICD-10-CM

## 2022-12-08 DIAGNOSIS — C83.38 DIFFUSE LARGE B-CELL LYMPHOMA, LYMPH NODES OF MULTIPLE SITES (HCC): ICD-10-CM

## 2022-12-08 LAB
ALBUMIN SERPL BCP-MCNC: 4.7 G/DL (ref 3.5–5)
ALP SERPL-CCNC: 134 U/L (ref 34–104)
ALT SERPL W P-5'-P-CCNC: 36 U/L (ref 7–52)
ANION GAP SERPL CALCULATED.3IONS-SCNC: 7 MMOL/L (ref 4–13)
ANISOCYTOSIS BLD QL SMEAR: PRESENT
AST SERPL W P-5'-P-CCNC: 20 U/L (ref 13–39)
BASOPHILS # BLD MANUAL: 0 THOUSAND/UL (ref 0–0.1)
BASOPHILS NFR MAR MANUAL: 0 % (ref 0–1)
BILIRUB SERPL-MCNC: 0.33 MG/DL (ref 0.2–1)
BUN SERPL-MCNC: 15 MG/DL (ref 5–25)
CALCIUM SERPL-MCNC: 9.3 MG/DL (ref 8.4–10.2)
CHLORIDE SERPL-SCNC: 103 MMOL/L (ref 96–108)
CO2 SERPL-SCNC: 26 MMOL/L (ref 21–32)
CREAT SERPL-MCNC: 0.72 MG/DL (ref 0.6–1.3)
DOHLE BOD BLD QL SMEAR: PRESENT
EOSINOPHIL # BLD MANUAL: 0.17 THOUSAND/UL (ref 0–0.4)
EOSINOPHIL NFR BLD MANUAL: 1 % (ref 0–6)
ERYTHROCYTE [DISTWIDTH] IN BLOOD BY AUTOMATED COUNT: 18.6 % (ref 11.6–15.1)
GFR SERPL CREATININE-BSD FRML MDRD: 112 ML/MIN/1.73SQ M
GLUCOSE SERPL-MCNC: 95 MG/DL (ref 65–140)
HCT VFR BLD AUTO: 33.2 % (ref 36.5–49.3)
HGB BLD-MCNC: 11.3 G/DL (ref 12–17)
LYMPHOCYTES # BLD AUTO: 1.03 THOUSAND/UL (ref 0.6–4.47)
LYMPHOCYTES # BLD AUTO: 6 % (ref 14–44)
MAGNESIUM SERPL-MCNC: 2 MG/DL (ref 1.9–2.7)
MCH RBC QN AUTO: 32.3 PG (ref 26.8–34.3)
MCHC RBC AUTO-ENTMCNC: 34 G/DL (ref 31.4–37.4)
MCV RBC AUTO: 95 FL (ref 82–98)
METAMYELOCYTES NFR BLD MANUAL: 2 % (ref 0–1)
MONOCYTES # BLD AUTO: 1.21 THOUSAND/UL (ref 0–1.22)
MONOCYTES NFR BLD: 7 % (ref 4–12)
MYELOCYTES NFR BLD MANUAL: 3 % (ref 0–1)
NEUTROPHILS # BLD MANUAL: 13.96 THOUSAND/UL (ref 1.85–7.62)
NEUTS BAND NFR BLD MANUAL: 8 % (ref 0–8)
NEUTS SEG NFR BLD AUTO: 73 % (ref 43–75)
NRBC BLD AUTO-RTO: 1 /100 WBC (ref 0–2)
PLATELET # BLD AUTO: 100 THOUSANDS/UL (ref 149–390)
PLATELET BLD QL SMEAR: ABNORMAL
PMV BLD AUTO: 10.2 FL (ref 8.9–12.7)
POLYCHROMASIA BLD QL SMEAR: PRESENT
POTASSIUM SERPL-SCNC: 3.9 MMOL/L (ref 3.5–5.3)
PROT SERPL-MCNC: 6.7 G/DL (ref 6.4–8.4)
RBC # BLD AUTO: 3.5 MILLION/UL (ref 3.88–5.62)
RBC MORPH BLD: PRESENT
SODIUM SERPL-SCNC: 136 MMOL/L (ref 135–147)
WBC # BLD AUTO: 17.23 THOUSAND/UL (ref 4.31–10.16)

## 2022-12-08 NOTE — PROGRESS NOTES
Patient to [facility] for Lab Testing / Port Maintenance: Offers no complaints at present time: Right PAC accessed without difficulty: Good blood return noted: Labs drawn per MD order

## 2022-12-12 ENCOUNTER — LAB REQUISITION (OUTPATIENT)
Dept: LAB | Facility: HOSPITAL | Age: 47
End: 2022-12-12

## 2022-12-12 ENCOUNTER — HOSPITAL ENCOUNTER (OUTPATIENT)
Dept: INFUSION CENTER | Facility: CLINIC | Age: 47
Discharge: HOME | End: 2022-12-12

## 2022-12-12 DIAGNOSIS — C83.38 DIFFUSE LARGE B-CELL LYMPHOMA, LYMPH NODES OF MULTIPLE SITES (HCC): ICD-10-CM

## 2022-12-12 DIAGNOSIS — C83.32 DIFFUSE LARGE B-CELL LYMPHOMA OF INTRATHORACIC LYMPH NODES (HCC): Primary | ICD-10-CM

## 2022-12-12 LAB
ALBUMIN SERPL BCP-MCNC: 4.5 G/DL (ref 3.5–5)
ALP SERPL-CCNC: 126 U/L (ref 34–104)
ALT SERPL W P-5'-P-CCNC: 33 U/L (ref 7–52)
ANION GAP SERPL CALCULATED.3IONS-SCNC: 5 MMOL/L (ref 4–13)
ANISOCYTOSIS BLD QL SMEAR: PRESENT
AST SERPL W P-5'-P-CCNC: 19 U/L (ref 13–39)
BASOPHILS # BLD MANUAL: 0 THOUSAND/UL (ref 0–0.1)
BASOPHILS NFR MAR MANUAL: 0 % (ref 0–1)
BILIRUB SERPL-MCNC: 0.34 MG/DL (ref 0.2–1)
BUN SERPL-MCNC: 15 MG/DL (ref 5–25)
CALCIUM SERPL-MCNC: 9.5 MG/DL (ref 8.4–10.2)
CHLORIDE SERPL-SCNC: 104 MMOL/L (ref 96–108)
CO2 SERPL-SCNC: 27 MMOL/L (ref 21–32)
CREAT SERPL-MCNC: 0.68 MG/DL (ref 0.6–1.3)
EOSINOPHIL # BLD MANUAL: 0.16 THOUSAND/UL (ref 0–0.4)
EOSINOPHIL NFR BLD MANUAL: 1 % (ref 0–6)
ERYTHROCYTE [DISTWIDTH] IN BLOOD BY AUTOMATED COUNT: 19 % (ref 11.6–15.1)
GFR SERPL CREATININE-BSD FRML MDRD: 115 ML/MIN/1.73SQ M
GLUCOSE SERPL-MCNC: 87 MG/DL (ref 65–140)
HCT VFR BLD AUTO: 33.4 % (ref 36.5–49.3)
HGB BLD-MCNC: 11.3 G/DL (ref 12–17)
HYPERCHROMIA BLD QL SMEAR: PRESENT
LYMPHOCYTES # BLD AUTO: 0.32 THOUSAND/UL (ref 0.6–4.47)
LYMPHOCYTES # BLD AUTO: 2 % (ref 14–44)
MAGNESIUM SERPL-MCNC: 2 MG/DL (ref 1.9–2.7)
MCH RBC QN AUTO: 32.6 PG (ref 26.8–34.3)
MCHC RBC AUTO-ENTMCNC: 33.8 G/DL (ref 31.4–37.4)
MCV RBC AUTO: 96 FL (ref 82–98)
MONOCYTES # BLD AUTO: 1.26 THOUSAND/UL (ref 0–1.22)
MONOCYTES NFR BLD: 8 % (ref 4–12)
NEUTROPHILS # BLD MANUAL: 14.06 THOUSAND/UL (ref 1.85–7.62)
NEUTS BAND NFR BLD MANUAL: 4 % (ref 0–8)
NEUTS SEG NFR BLD AUTO: 85 % (ref 43–75)
PLATELET # BLD AUTO: 164 THOUSANDS/UL (ref 149–390)
PLATELET BLD QL SMEAR: ADEQUATE
PMV BLD AUTO: 10.1 FL (ref 8.9–12.7)
POLYCHROMASIA BLD QL SMEAR: PRESENT
POTASSIUM SERPL-SCNC: 3.7 MMOL/L (ref 3.5–5.3)
PROT SERPL-MCNC: 6.8 G/DL (ref 6.4–8.4)
RBC # BLD AUTO: 3.47 MILLION/UL (ref 3.88–5.62)
RBC MORPH BLD: PRESENT
SODIUM SERPL-SCNC: 136 MMOL/L (ref 135–147)
WBC # BLD AUTO: 15.8 THOUSAND/UL (ref 4.31–10.16)

## 2022-12-12 NOTE — PROGRESS NOTES
Patient is here for central labs for [facility]  He offers no complaints at this time  Labs drawn per dr's orders   Next appointment confirmed and he declined avs

## 2022-12-14 DIAGNOSIS — C83.32 DIFFUSE LARGE B-CELL LYMPHOMA OF INTRATHORACIC LYMPH NODES (HCC): Primary | ICD-10-CM

## 2022-12-14 DIAGNOSIS — U07.1 COVID: ICD-10-CM

## 2022-12-14 DIAGNOSIS — R05.9 COUGH, UNSPECIFIED TYPE: ICD-10-CM

## 2022-12-15 ENCOUNTER — LAB REQUISITION (OUTPATIENT)
Dept: LAB | Facility: HOSPITAL | Age: 47
End: 2022-12-15

## 2022-12-15 ENCOUNTER — HOSPITAL ENCOUNTER (OUTPATIENT)
Dept: INFUSION CENTER | Facility: CLINIC | Age: 47
Discharge: HOME | End: 2022-12-15

## 2022-12-15 DIAGNOSIS — C83.38 DIFFUSE LARGE B-CELL LYMPHOMA, LYMPH NODES OF MULTIPLE SITES (HCC): ICD-10-CM

## 2022-12-15 DIAGNOSIS — C83.32 DIFFUSE LARGE B-CELL LYMPHOMA OF INTRATHORACIC LYMPH NODES (HCC): Primary | ICD-10-CM

## 2022-12-15 LAB
ALBUMIN SERPL BCP-MCNC: 4.6 G/DL (ref 3.5–5)
ALP SERPL-CCNC: 106 U/L (ref 34–104)
ALT SERPL W P-5'-P-CCNC: 33 U/L (ref 7–52)
ANION GAP SERPL CALCULATED.3IONS-SCNC: 6 MMOL/L (ref 4–13)
AST SERPL W P-5'-P-CCNC: 19 U/L (ref 13–39)
BASOPHILS # BLD AUTO: 0.04 THOUSANDS/ÂΜL (ref 0–0.1)
BASOPHILS NFR BLD AUTO: 1 % (ref 0–1)
BILIRUB SERPL-MCNC: 0.37 MG/DL (ref 0.2–1)
BUN SERPL-MCNC: 13 MG/DL (ref 5–25)
CALCIUM SERPL-MCNC: 9.3 MG/DL (ref 8.4–10.2)
CHLORIDE SERPL-SCNC: 104 MMOL/L (ref 96–108)
CO2 SERPL-SCNC: 27 MMOL/L (ref 21–32)
CREAT SERPL-MCNC: 0.7 MG/DL (ref 0.6–1.3)
EOSINOPHIL # BLD AUTO: 0.11 THOUSAND/ÂΜL (ref 0–0.61)
EOSINOPHIL NFR BLD AUTO: 1 % (ref 0–6)
ERYTHROCYTE [DISTWIDTH] IN BLOOD BY AUTOMATED COUNT: 19.6 % (ref 11.6–15.1)
GFR SERPL CREATININE-BSD FRML MDRD: 113 ML/MIN/1.73SQ M
GLUCOSE SERPL-MCNC: 86 MG/DL (ref 65–140)
HCT VFR BLD AUTO: 34.8 % (ref 36.5–49.3)
HGB BLD-MCNC: 11.5 G/DL (ref 12–17)
IMM GRANULOCYTES # BLD AUTO: 0.11 THOUSAND/UL (ref 0–0.2)
IMM GRANULOCYTES NFR BLD AUTO: 1 % (ref 0–2)
LYMPHOCYTES # BLD AUTO: 0.55 THOUSANDS/ÂΜL (ref 0.6–4.47)
LYMPHOCYTES NFR BLD AUTO: 7 % (ref 14–44)
MAGNESIUM SERPL-MCNC: 2.1 MG/DL (ref 1.9–2.7)
MCH RBC QN AUTO: 31.6 PG (ref 26.8–34.3)
MCHC RBC AUTO-ENTMCNC: 33 G/DL (ref 31.4–37.4)
MCV RBC AUTO: 96 FL (ref 82–98)
MONOCYTES # BLD AUTO: 0.8 THOUSAND/ÂΜL (ref 0.17–1.22)
MONOCYTES NFR BLD AUTO: 10 % (ref 4–12)
NEUTROPHILS # BLD AUTO: 6.26 THOUSANDS/ÂΜL (ref 1.85–7.62)
NEUTS SEG NFR BLD AUTO: 80 % (ref 43–75)
NRBC BLD AUTO-RTO: 0 /100 WBCS
PLATELET # BLD AUTO: 209 THOUSANDS/UL (ref 149–390)
PMV BLD AUTO: 9.3 FL (ref 8.9–12.7)
POTASSIUM SERPL-SCNC: 4 MMOL/L (ref 3.5–5.3)
PROT SERPL-MCNC: 6.7 G/DL (ref 6.4–8.4)
RBC # BLD AUTO: 3.64 MILLION/UL (ref 3.88–5.62)
SODIUM SERPL-SCNC: 137 MMOL/L (ref 135–147)
WBC # BLD AUTO: 7.87 THOUSAND/UL (ref 4.31–10.16)

## 2022-12-15 NOTE — PROGRESS NOTES
Patient is here for central labs for [facility]  He offers no complaints at this time  Labs drawn per dr's orders  Patient has no further appointments at this time because he is going to [facility] for a transplant in January

## 2022-12-27 DIAGNOSIS — C83.32 DIFFUSE LARGE B-CELL LYMPHOMA OF INTRATHORACIC LYMPH NODES (HCC): ICD-10-CM

## 2022-12-28 LAB — SARS-COV-2 RNA RESP QL NAA+PROBE: NEGATIVE

## 2023-01-19 ENCOUNTER — LAB REQUISITION (OUTPATIENT)
Dept: LAB | Facility: HOSPITAL | Age: 48
End: 2023-01-19

## 2023-01-19 ENCOUNTER — HOSPITAL ENCOUNTER (OUTPATIENT)
Dept: INFUSION CENTER | Facility: CLINIC | Age: 48
Discharge: HOME | End: 2023-01-19

## 2023-01-19 DIAGNOSIS — C83.38 DIFFUSE LARGE B-CELL LYMPHOMA, LYMPH NODES OF MULTIPLE SITES (HCC): ICD-10-CM

## 2023-01-19 DIAGNOSIS — C83.32 DIFFUSE LARGE B-CELL LYMPHOMA OF INTRATHORACIC LYMPH NODES (HCC): Primary | ICD-10-CM

## 2023-01-19 LAB
BASOPHILS # BLD AUTO: 0.02 THOUSANDS/ÂΜL (ref 0–0.1)
BASOPHILS NFR BLD AUTO: 1 % (ref 0–1)
EOSINOPHIL # BLD AUTO: 0 THOUSAND/ÂΜL (ref 0–0.61)
EOSINOPHIL NFR BLD AUTO: 0 % (ref 0–6)
ERYTHROCYTE [DISTWIDTH] IN BLOOD BY AUTOMATED COUNT: 15.7 % (ref 11.6–15.1)
HCT VFR BLD AUTO: 30.4 % (ref 36.5–49.3)
HGB BLD-MCNC: 10.1 G/DL (ref 12–17)
IMM GRANULOCYTES # BLD AUTO: 0.08 THOUSAND/UL (ref 0–0.2)
IMM GRANULOCYTES NFR BLD AUTO: 2 % (ref 0–2)
LYMPHOCYTES # BLD AUTO: 0.56 THOUSANDS/ÂΜL (ref 0.6–4.47)
LYMPHOCYTES NFR BLD AUTO: 17 % (ref 14–44)
MAGNESIUM SERPL-MCNC: 2.1 MG/DL (ref 1.9–2.7)
MCH RBC QN AUTO: 31.8 PG (ref 26.8–34.3)
MCHC RBC AUTO-ENTMCNC: 33.2 G/DL (ref 31.4–37.4)
MCV RBC AUTO: 96 FL (ref 82–98)
MONOCYTES # BLD AUTO: 0.72 THOUSAND/ÂΜL (ref 0.17–1.22)
MONOCYTES NFR BLD AUTO: 21 % (ref 4–12)
NEUTROPHILS # BLD AUTO: 2 THOUSANDS/ÂΜL (ref 1.85–7.62)
NEUTS SEG NFR BLD AUTO: 59 % (ref 43–75)
NRBC BLD AUTO-RTO: 0 /100 WBCS
PLATELET # BLD AUTO: 42 THOUSANDS/UL (ref 149–390)
PMV BLD AUTO: 10.3 FL (ref 8.9–12.7)
RBC # BLD AUTO: 3.18 MILLION/UL (ref 3.88–5.62)
WBC # BLD AUTO: 3.38 THOUSAND/UL (ref 4.31–10.16)

## 2023-01-19 NOTE — PROGRESS NOTES
Patient reports to infusion center for lab specimen collection via R CW port today  Patient reports feeling well with no complaints, labs obtained via R port with no difficulty  Patient tolerated procedure well with no adverse events, positive blood return  Patient aware of next appointment, AVS declined

## 2023-01-20 ENCOUNTER — HOSPITAL ENCOUNTER (OUTPATIENT)
Dept: INFUSION CENTER | Facility: CLINIC | Age: 48
Discharge: HOME | End: 2023-01-20

## 2023-01-20 LAB
ALBUMIN SERPL BCP-MCNC: 4.3 G/DL (ref 3.5–5)
ALP SERPL-CCNC: 87 U/L (ref 34–104)
ALT SERPL W P-5'-P-CCNC: 33 U/L (ref 7–52)
ANION GAP SERPL CALCULATED.3IONS-SCNC: 10 MMOL/L (ref 4–13)
AST SERPL W P-5'-P-CCNC: 16 U/L (ref 13–39)
BILIRUB SERPL-MCNC: 0.42 MG/DL (ref 0.2–1)
BUN SERPL-MCNC: 16 MG/DL (ref 5–25)
CALCIUM SERPL-MCNC: 9.1 MG/DL (ref 8.4–10.2)
CHLORIDE SERPL-SCNC: 106 MMOL/L (ref 96–108)
CO2 SERPL-SCNC: 22 MMOL/L (ref 21–32)
CREAT SERPL-MCNC: 0.66 MG/DL (ref 0.6–1.3)
GFR SERPL CREATININE-BSD FRML MDRD: 116 ML/MIN/1.73SQ M
GLUCOSE SERPL-MCNC: 114 MG/DL (ref 65–140)
POTASSIUM SERPL-SCNC: 4 MMOL/L (ref 3.5–5.3)
PROT SERPL-MCNC: 6.5 G/DL (ref 6.4–8.4)
SODIUM SERPL-SCNC: 138 MMOL/L (ref 135–147)

## 2023-01-20 NOTE — PROGRESS NOTES
Pt arrived for lab collection (CMP)  Stated Mag &CBC drawn yesterday  Called lab & they stated they are able to add CMP to yesterdays specimen

## 2023-01-26 ENCOUNTER — LAB REQUISITION (OUTPATIENT)
Dept: LAB | Facility: HOSPITAL | Age: 48
End: 2023-01-26

## 2023-01-26 ENCOUNTER — HOSPITAL ENCOUNTER (OUTPATIENT)
Dept: INFUSION CENTER | Facility: CLINIC | Age: 48
Discharge: HOME | End: 2023-01-26

## 2023-01-26 DIAGNOSIS — C83.38 DIFFUSE LARGE B-CELL LYMPHOMA, LYMPH NODES OF MULTIPLE SITES (HCC): ICD-10-CM

## 2023-01-26 DIAGNOSIS — C83.32 DIFFUSE LARGE B-CELL LYMPHOMA OF INTRATHORACIC LYMPH NODES (HCC): Primary | ICD-10-CM

## 2023-01-26 LAB
ALBUMIN SERPL BCP-MCNC: 4.4 G/DL (ref 3.5–5)
ALP SERPL-CCNC: 75 U/L (ref 34–104)
ALT SERPL W P-5'-P-CCNC: 30 U/L (ref 7–52)
ANION GAP SERPL CALCULATED.3IONS-SCNC: 8 MMOL/L (ref 4–13)
AST SERPL W P-5'-P-CCNC: 17 U/L (ref 13–39)
BASOPHILS # BLD AUTO: 0.04 THOUSANDS/ÂΜL (ref 0–0.1)
BASOPHILS NFR BLD AUTO: 2 % (ref 0–1)
BILIRUB SERPL-MCNC: 0.42 MG/DL (ref 0.2–1)
BUN SERPL-MCNC: 17 MG/DL (ref 5–25)
CALCIUM SERPL-MCNC: 9.1 MG/DL (ref 8.4–10.2)
CHLORIDE SERPL-SCNC: 105 MMOL/L (ref 96–108)
CO2 SERPL-SCNC: 24 MMOL/L (ref 21–32)
CREAT SERPL-MCNC: 0.71 MG/DL (ref 0.6–1.3)
EOSINOPHIL # BLD AUTO: 0.01 THOUSAND/ÂΜL (ref 0–0.61)
EOSINOPHIL NFR BLD AUTO: 0 % (ref 0–6)
ERYTHROCYTE [DISTWIDTH] IN BLOOD BY AUTOMATED COUNT: 17.3 % (ref 11.6–15.1)
GFR SERPL CREATININE-BSD FRML MDRD: 113 ML/MIN/1.73SQ M
GLUCOSE SERPL-MCNC: 134 MG/DL (ref 65–140)
HCT VFR BLD AUTO: 33.2 % (ref 36.5–49.3)
HGB BLD-MCNC: 11.2 G/DL (ref 12–17)
IMM GRANULOCYTES # BLD AUTO: 0.01 THOUSAND/UL (ref 0–0.2)
IMM GRANULOCYTES NFR BLD AUTO: 0 % (ref 0–2)
LYMPHOCYTES # BLD AUTO: 0.48 THOUSANDS/ÂΜL (ref 0.6–4.47)
LYMPHOCYTES NFR BLD AUTO: 19 % (ref 14–44)
MAGNESIUM SERPL-MCNC: 2 MG/DL (ref 1.9–2.7)
MCH RBC QN AUTO: 33.2 PG (ref 26.8–34.3)
MCHC RBC AUTO-ENTMCNC: 33.7 G/DL (ref 31.4–37.4)
MCV RBC AUTO: 99 FL (ref 82–98)
MONOCYTES # BLD AUTO: 0.49 THOUSAND/ÂΜL (ref 0.17–1.22)
MONOCYTES NFR BLD AUTO: 19 % (ref 4–12)
NEUTROPHILS # BLD AUTO: 1.52 THOUSANDS/ÂΜL (ref 1.85–7.62)
NEUTS SEG NFR BLD AUTO: 60 % (ref 43–75)
NRBC BLD AUTO-RTO: 0 /100 WBCS
PLATELET # BLD AUTO: 86 THOUSANDS/UL (ref 149–390)
PMV BLD AUTO: 10.2 FL (ref 8.9–12.7)
POTASSIUM SERPL-SCNC: 3.9 MMOL/L (ref 3.5–5.3)
PROT SERPL-MCNC: 6.5 G/DL (ref 6.4–8.4)
RBC # BLD AUTO: 3.37 MILLION/UL (ref 3.88–5.62)
SODIUM SERPL-SCNC: 137 MMOL/L (ref 135–147)
WBC # BLD AUTO: 2.55 THOUSAND/UL (ref 4.31–10.16)

## 2023-01-30 ENCOUNTER — HOSPITAL ENCOUNTER (OUTPATIENT)
Dept: INFUSION CENTER | Facility: CLINIC | Age: 48
Discharge: HOME | End: 2023-01-30

## 2023-01-30 ENCOUNTER — LAB REQUISITION (OUTPATIENT)
Dept: LAB | Facility: HOSPITAL | Age: 48
End: 2023-01-30

## 2023-01-30 DIAGNOSIS — C83.32 DIFFUSE LARGE B-CELL LYMPHOMA OF INTRATHORACIC LYMPH NODES (HCC): Primary | ICD-10-CM

## 2023-01-30 DIAGNOSIS — C83.38 DIFFUSE LARGE B-CELL LYMPHOMA, LYMPH NODES OF MULTIPLE SITES (HCC): ICD-10-CM

## 2023-01-30 LAB
ALBUMIN SERPL BCP-MCNC: 4.3 G/DL (ref 3.5–5)
ALP SERPL-CCNC: 65 U/L (ref 34–104)
ALT SERPL W P-5'-P-CCNC: 24 U/L (ref 7–52)
ANION GAP SERPL CALCULATED.3IONS-SCNC: 5 MMOL/L (ref 4–13)
AST SERPL W P-5'-P-CCNC: 14 U/L (ref 13–39)
BASOPHILS # BLD AUTO: 0.04 THOUSANDS/ÂΜL (ref 0–0.1)
BASOPHILS NFR BLD AUTO: 1 % (ref 0–1)
BILIRUB SERPL-MCNC: 0.4 MG/DL (ref 0.2–1)
BUN SERPL-MCNC: 16 MG/DL (ref 5–25)
CALCIUM SERPL-MCNC: 9.3 MG/DL (ref 8.4–10.2)
CHLORIDE SERPL-SCNC: 106 MMOL/L (ref 96–108)
CO2 SERPL-SCNC: 27 MMOL/L (ref 21–32)
CREAT SERPL-MCNC: 0.62 MG/DL (ref 0.6–1.3)
EOSINOPHIL # BLD AUTO: 0.03 THOUSAND/ÂΜL (ref 0–0.61)
EOSINOPHIL NFR BLD AUTO: 1 % (ref 0–6)
ERYTHROCYTE [DISTWIDTH] IN BLOOD BY AUTOMATED COUNT: 17.6 % (ref 11.6–15.1)
GFR SERPL CREATININE-BSD FRML MDRD: 119 ML/MIN/1.73SQ M
GLUCOSE SERPL-MCNC: 91 MG/DL (ref 65–140)
HCT VFR BLD AUTO: 33.9 % (ref 36.5–49.3)
HGB BLD-MCNC: 11.5 G/DL (ref 12–17)
IMM GRANULOCYTES # BLD AUTO: 0.01 THOUSAND/UL (ref 0–0.2)
IMM GRANULOCYTES NFR BLD AUTO: 0 % (ref 0–2)
LYMPHOCYTES # BLD AUTO: 0.49 THOUSANDS/ÂΜL (ref 0.6–4.47)
LYMPHOCYTES NFR BLD AUTO: 17 % (ref 14–44)
MAGNESIUM SERPL-MCNC: 1.9 MG/DL (ref 1.9–2.7)
MCH RBC QN AUTO: 33.7 PG (ref 26.8–34.3)
MCHC RBC AUTO-ENTMCNC: 33.9 G/DL (ref 31.4–37.4)
MCV RBC AUTO: 99 FL (ref 82–98)
MONOCYTES # BLD AUTO: 0.67 THOUSAND/ÂΜL (ref 0.17–1.22)
MONOCYTES NFR BLD AUTO: 23 % (ref 4–12)
NEUTROPHILS # BLD AUTO: 1.66 THOUSANDS/ÂΜL (ref 1.85–7.62)
NEUTS SEG NFR BLD AUTO: 58 % (ref 43–75)
NRBC BLD AUTO-RTO: 0 /100 WBCS
PLATELET # BLD AUTO: 122 THOUSANDS/UL (ref 149–390)
PMV BLD AUTO: 9.6 FL (ref 8.9–12.7)
POTASSIUM SERPL-SCNC: 3.7 MMOL/L (ref 3.5–5.3)
PROT SERPL-MCNC: 6.3 G/DL (ref 6.4–8.4)
RBC # BLD AUTO: 3.41 MILLION/UL (ref 3.88–5.62)
SODIUM SERPL-SCNC: 138 MMOL/L (ref 135–147)
WBC # BLD AUTO: 2.9 THOUSAND/UL (ref 4.31–10.16)

## 2023-02-02 ENCOUNTER — LAB REQUISITION (OUTPATIENT)
Dept: LAB | Facility: HOSPITAL | Age: 48
End: 2023-02-02

## 2023-02-02 ENCOUNTER — HOSPITAL ENCOUNTER (OUTPATIENT)
Dept: INFUSION CENTER | Facility: CLINIC | Age: 48
Discharge: HOME | End: 2023-02-02

## 2023-02-02 DIAGNOSIS — C83.32 DIFFUSE LARGE B-CELL LYMPHOMA OF INTRATHORACIC LYMPH NODES (HCC): Primary | ICD-10-CM

## 2023-02-02 DIAGNOSIS — C83.38 DIFFUSE LARGE B-CELL LYMPHOMA, LYMPH NODES OF MULTIPLE SITES (HCC): ICD-10-CM

## 2023-02-02 LAB
ALBUMIN SERPL BCP-MCNC: 4.5 G/DL (ref 3.5–5)
ALP SERPL-CCNC: 66 U/L (ref 34–104)
ALT SERPL W P-5'-P-CCNC: 29 U/L (ref 7–52)
ANION GAP SERPL CALCULATED.3IONS-SCNC: 7 MMOL/L (ref 4–13)
AST SERPL W P-5'-P-CCNC: 18 U/L (ref 13–39)
BASOPHILS # BLD AUTO: 0.03 THOUSANDS/ÂΜL (ref 0–0.1)
BASOPHILS NFR BLD AUTO: 1 % (ref 0–1)
BILIRUB SERPL-MCNC: 0.52 MG/DL (ref 0.2–1)
BUN SERPL-MCNC: 13 MG/DL (ref 5–25)
CALCIUM SERPL-MCNC: 9.3 MG/DL (ref 8.4–10.2)
CHLORIDE SERPL-SCNC: 106 MMOL/L (ref 96–108)
CO2 SERPL-SCNC: 26 MMOL/L (ref 21–32)
CREAT SERPL-MCNC: 0.74 MG/DL (ref 0.6–1.3)
EOSINOPHIL # BLD AUTO: 0.08 THOUSAND/ÂΜL (ref 0–0.61)
EOSINOPHIL NFR BLD AUTO: 3 % (ref 0–6)
ERYTHROCYTE [DISTWIDTH] IN BLOOD BY AUTOMATED COUNT: 17.2 % (ref 11.6–15.1)
GFR SERPL CREATININE-BSD FRML MDRD: 111 ML/MIN/1.73SQ M
GLUCOSE SERPL-MCNC: 121 MG/DL (ref 65–140)
HCT VFR BLD AUTO: 35.5 % (ref 36.5–49.3)
HGB BLD-MCNC: 12.1 G/DL (ref 12–17)
IMM GRANULOCYTES # BLD AUTO: 0.01 THOUSAND/UL (ref 0–0.2)
IMM GRANULOCYTES NFR BLD AUTO: 0 % (ref 0–2)
LYMPHOCYTES # BLD AUTO: 0.41 THOUSANDS/ÂΜL (ref 0.6–4.47)
LYMPHOCYTES NFR BLD AUTO: 17 % (ref 14–44)
MAGNESIUM SERPL-MCNC: 1.9 MG/DL (ref 1.9–2.7)
MCH RBC QN AUTO: 33.6 PG (ref 26.8–34.3)
MCHC RBC AUTO-ENTMCNC: 34.1 G/DL (ref 31.4–37.4)
MCV RBC AUTO: 99 FL (ref 82–98)
MONOCYTES # BLD AUTO: 0.6 THOUSAND/ÂΜL (ref 0.17–1.22)
MONOCYTES NFR BLD AUTO: 25 % (ref 4–12)
NEUTROPHILS # BLD AUTO: 1.32 THOUSANDS/ÂΜL (ref 1.85–7.62)
NEUTS SEG NFR BLD AUTO: 54 % (ref 43–75)
NRBC BLD AUTO-RTO: 0 /100 WBCS
PLATELET # BLD AUTO: 128 THOUSANDS/UL (ref 149–390)
PMV BLD AUTO: 9.5 FL (ref 8.9–12.7)
POTASSIUM SERPL-SCNC: 3.9 MMOL/L (ref 3.5–5.3)
PROT SERPL-MCNC: 6.4 G/DL (ref 6.4–8.4)
RBC # BLD AUTO: 3.6 MILLION/UL (ref 3.88–5.62)
SODIUM SERPL-SCNC: 139 MMOL/L (ref 135–147)
WBC # BLD AUTO: 2.45 THOUSAND/UL (ref 4.31–10.16)

## 2023-02-06 ENCOUNTER — LAB REQUISITION (OUTPATIENT)
Dept: LAB | Facility: HOSPITAL | Age: 48
End: 2023-02-06

## 2023-02-06 ENCOUNTER — HOSPITAL ENCOUNTER (OUTPATIENT)
Dept: INFUSION CENTER | Facility: CLINIC | Age: 48
Discharge: HOME | End: 2023-02-06

## 2023-02-06 DIAGNOSIS — C83.38 DIFFUSE LARGE B-CELL LYMPHOMA, LYMPH NODES OF MULTIPLE SITES (HCC): ICD-10-CM

## 2023-02-06 DIAGNOSIS — C83.32 DIFFUSE LARGE B-CELL LYMPHOMA OF INTRATHORACIC LYMPH NODES (HCC): Primary | ICD-10-CM

## 2023-02-06 LAB
ALBUMIN SERPL BCP-MCNC: 4.6 G/DL (ref 3.5–5)
ALP SERPL-CCNC: 61 U/L (ref 34–104)
ALT SERPL W P-5'-P-CCNC: 31 U/L (ref 7–52)
ANION GAP SERPL CALCULATED.3IONS-SCNC: 10 MMOL/L (ref 4–13)
AST SERPL W P-5'-P-CCNC: 20 U/L (ref 13–39)
BASOPHILS # BLD AUTO: 0.03 THOUSANDS/ÂΜL (ref 0–0.1)
BASOPHILS NFR BLD AUTO: 1 % (ref 0–1)
BILIRUB SERPL-MCNC: 0.42 MG/DL (ref 0.2–1)
BUN SERPL-MCNC: 15 MG/DL (ref 5–25)
CALCIUM SERPL-MCNC: 9.5 MG/DL (ref 8.4–10.2)
CHLORIDE SERPL-SCNC: 105 MMOL/L (ref 96–108)
CO2 SERPL-SCNC: 23 MMOL/L (ref 21–32)
CREAT SERPL-MCNC: 0.68 MG/DL (ref 0.6–1.3)
EOSINOPHIL # BLD AUTO: 0.15 THOUSAND/ÂΜL (ref 0–0.61)
EOSINOPHIL NFR BLD AUTO: 3 % (ref 0–6)
ERYTHROCYTE [DISTWIDTH] IN BLOOD BY AUTOMATED COUNT: 16.8 % (ref 11.6–15.1)
GFR SERPL CREATININE-BSD FRML MDRD: 115 ML/MIN/1.73SQ M
GLUCOSE SERPL-MCNC: 87 MG/DL (ref 65–140)
HCT VFR BLD AUTO: 37.2 % (ref 36.5–49.3)
HGB BLD-MCNC: 12.6 G/DL (ref 12–17)
IMM GRANULOCYTES # BLD AUTO: 0.02 THOUSAND/UL (ref 0–0.2)
IMM GRANULOCYTES NFR BLD AUTO: 0 % (ref 0–2)
LYMPHOCYTES # BLD AUTO: 0.55 THOUSANDS/ÂΜL (ref 0.6–4.47)
LYMPHOCYTES NFR BLD AUTO: 12 % (ref 14–44)
MAGNESIUM SERPL-MCNC: 2.1 MG/DL (ref 1.9–2.7)
MCH RBC QN AUTO: 33.2 PG (ref 26.8–34.3)
MCHC RBC AUTO-ENTMCNC: 33.9 G/DL (ref 31.4–37.4)
MCV RBC AUTO: 98 FL (ref 82–98)
MONOCYTES # BLD AUTO: 0.96 THOUSAND/ÂΜL (ref 0.17–1.22)
MONOCYTES NFR BLD AUTO: 22 % (ref 4–12)
NEUTROPHILS # BLD AUTO: 2.76 THOUSANDS/ÂΜL (ref 1.85–7.62)
NEUTS SEG NFR BLD AUTO: 62 % (ref 43–75)
NRBC BLD AUTO-RTO: 0 /100 WBCS
PLATELET # BLD AUTO: 190 THOUSANDS/UL (ref 149–390)
PMV BLD AUTO: 9.9 FL (ref 8.9–12.7)
POTASSIUM SERPL-SCNC: 3.8 MMOL/L (ref 3.5–5.3)
PROT SERPL-MCNC: 6.5 G/DL (ref 6.4–8.4)
RBC # BLD AUTO: 3.79 MILLION/UL (ref 3.88–5.62)
SODIUM SERPL-SCNC: 138 MMOL/L (ref 135–147)
WBC # BLD AUTO: 4.47 THOUSAND/UL (ref 4.31–10.16)

## 2023-02-06 NOTE — PROGRESS NOTES
Patient arrived for central line blood work  Offers no complaints  Blood work drawn with port access  Port flushed per protocol  Patient tolerated well   Patient verified upcoming appointment and declined AVS

## 2023-03-02 ENCOUNTER — LAB REQUISITION (OUTPATIENT)
Dept: LAB | Facility: HOSPITAL | Age: 48
End: 2023-03-02

## 2023-03-02 ENCOUNTER — HOSPITAL ENCOUNTER (OUTPATIENT)
Dept: INFUSION CENTER | Facility: CLINIC | Age: 48
Discharge: HOME | End: 2023-03-02

## 2023-03-02 DIAGNOSIS — C83.32 DIFFUSE LARGE B-CELL LYMPHOMA OF INTRATHORACIC LYMPH NODES (HCC): Primary | ICD-10-CM

## 2023-03-02 DIAGNOSIS — C83.38 DIFFUSE LARGE B-CELL LYMPHOMA, LYMPH NODES OF MULTIPLE SITES (HCC): ICD-10-CM

## 2023-03-02 LAB
ALBUMIN SERPL BCP-MCNC: 4.5 G/DL (ref 3.5–5)
ALP SERPL-CCNC: 73 U/L (ref 34–104)
ALT SERPL W P-5'-P-CCNC: 38 U/L (ref 7–52)
ANION GAP SERPL CALCULATED.3IONS-SCNC: 6 MMOL/L (ref 4–13)
AST SERPL W P-5'-P-CCNC: 21 U/L (ref 13–39)
BASOPHILS # BLD AUTO: 0.03 THOUSANDS/ÂΜL (ref 0–0.1)
BASOPHILS NFR BLD AUTO: 1 % (ref 0–1)
BILIRUB SERPL-MCNC: 0.48 MG/DL (ref 0.2–1)
BUN SERPL-MCNC: 17 MG/DL (ref 5–25)
CALCIUM SERPL-MCNC: 9.4 MG/DL (ref 8.4–10.2)
CHLORIDE SERPL-SCNC: 105 MMOL/L (ref 96–108)
CO2 SERPL-SCNC: 27 MMOL/L (ref 21–32)
CREAT SERPL-MCNC: 0.72 MG/DL (ref 0.6–1.3)
EOSINOPHIL # BLD AUTO: 0.07 THOUSAND/ÂΜL (ref 0–0.61)
EOSINOPHIL NFR BLD AUTO: 2 % (ref 0–6)
ERYTHROCYTE [DISTWIDTH] IN BLOOD BY AUTOMATED COUNT: 13.9 % (ref 11.6–15.1)
GFR SERPL CREATININE-BSD FRML MDRD: 112 ML/MIN/1.73SQ M
GLUCOSE SERPL-MCNC: 101 MG/DL (ref 65–140)
HCT VFR BLD AUTO: 41.2 % (ref 36.5–49.3)
HGB BLD-MCNC: 13.9 G/DL (ref 12–17)
IMM GRANULOCYTES # BLD AUTO: 0.01 THOUSAND/UL (ref 0–0.2)
IMM GRANULOCYTES NFR BLD AUTO: 0 % (ref 0–2)
LYMPHOCYTES # BLD AUTO: 0.47 THOUSANDS/ÂΜL (ref 0.6–4.47)
LYMPHOCYTES NFR BLD AUTO: 11 % (ref 14–44)
MAGNESIUM SERPL-MCNC: 2.1 MG/DL (ref 1.9–2.7)
MCH RBC QN AUTO: 32.8 PG (ref 26.8–34.3)
MCHC RBC AUTO-ENTMCNC: 33.7 G/DL (ref 31.4–37.4)
MCV RBC AUTO: 97 FL (ref 82–98)
MONOCYTES # BLD AUTO: 0.89 THOUSAND/ÂΜL (ref 0.17–1.22)
MONOCYTES NFR BLD AUTO: 21 % (ref 4–12)
NEUTROPHILS # BLD AUTO: 2.88 THOUSANDS/ÂΜL (ref 1.85–7.62)
NEUTS SEG NFR BLD AUTO: 65 % (ref 43–75)
NRBC BLD AUTO-RTO: 0 /100 WBCS
PLATELET # BLD AUTO: 204 THOUSANDS/UL (ref 149–390)
PMV BLD AUTO: 9.7 FL (ref 8.9–12.7)
POTASSIUM SERPL-SCNC: 3.9 MMOL/L (ref 3.5–5.3)
PROT SERPL-MCNC: 6.6 G/DL (ref 6.4–8.4)
RBC # BLD AUTO: 4.24 MILLION/UL (ref 3.88–5.62)
SODIUM SERPL-SCNC: 138 MMOL/L (ref 135–147)
WBC # BLD AUTO: 4.35 THOUSAND/UL (ref 4.31–10.16)

## 2023-03-02 NOTE — PROGRESS NOTES
Patient to infusion for central line blood work  He offers no complaints  Port accessed, good blood return noted,  Labs drawn per paper orders from [facility]    Confirmed next appointment, he decline AVS

## 2023-03-30 ENCOUNTER — HOSPITAL ENCOUNTER (OUTPATIENT)
Dept: INFUSION CENTER | Facility: CLINIC | Age: 48
End: 2023-03-30

## 2023-11-17 ENCOUNTER — OFFICE VISIT (OUTPATIENT)
Dept: OBGYN CLINIC | Facility: HOSPITAL | Age: 48
End: 2023-11-17
Payer: COMMERCIAL

## 2023-11-17 ENCOUNTER — HOSPITAL ENCOUNTER (OUTPATIENT)
Dept: RADIOLOGY | Facility: HOSPITAL | Age: 48
Discharge: HOME | End: 2023-11-17
Attending: ORTHOPAEDIC SURGERY
Payer: COMMERCIAL

## 2023-11-17 VITALS
BODY MASS INDEX: 29.82 KG/M2 | HEART RATE: 71 BPM | HEIGHT: 67 IN | WEIGHT: 190 LBS | SYSTOLIC BLOOD PRESSURE: 126 MMHG | DIASTOLIC BLOOD PRESSURE: 82 MMHG

## 2023-11-17 DIAGNOSIS — M25.749 METACARPAL BOSS: ICD-10-CM

## 2023-11-17 DIAGNOSIS — R22.31 MASS OF RIGHT HAND: Primary | ICD-10-CM

## 2023-11-17 DIAGNOSIS — R22.31 MASS OF RIGHT HAND: ICD-10-CM

## 2023-11-17 PROCEDURE — 73130 X-RAY EXAM OF HAND: CPT

## 2023-11-17 PROCEDURE — 99203 OFFICE O/P NEW LOW 30 MIN: CPT | Performed by: ORTHOPAEDIC SURGERY

## 2023-11-17 RX ORDER — PYRIDOXINE HCL (VITAMIN B6) 100 MG
100 TABLET ORAL DAILY
COMMUNITY

## 2023-11-17 RX ORDER — ACYCLOVIR 800 MG/1
800 TABLET ORAL EVERY 12 HOURS
COMMUNITY

## 2023-11-17 RX ORDER — SULFAMETHOXAZOLE AND TRIMETHOPRIM 800; 160 MG/1; MG/1
1 TABLET ORAL 3 TIMES WEEKLY
COMMUNITY
Start: 2023-07-24

## 2023-11-17 RX ORDER — LANOLIN ALCOHOL/MO/W.PET/CERES
CREAM (GRAM) TOPICAL
COMMUNITY
Start: 2023-08-16

## 2023-11-17 NOTE — PROGRESS NOTES
Assessment/Plan:  1. Mass of right hand  XR hand 3+ vw right      2. Metacarpal boss            Subjective history, physical examination performed, diagnostic imaging reviewed at today's visit  Diagnosis was discussed. This bump is a metacarpal boss. There is a metacarpal boss on the contralateral hand which is not as prominent. Treatment options were discussed which included nonoperative and operative treatment. Risks, benefits, and realistic expectations for treatment options were discussed. The patient was given an opportunity to ask questions. Questions were answered to the patient's satisfaction. Through shared decision making, the patient decided to move forward with nonoperative management which includes observation  If this was to increase in size, advised to come back and a repeat x-ray will be performed. Follow up in the office as needed if symptoms worsen or fail to improve. cc: right hand/wrist lump     Subjective:   Abdoul Adamson is a right hand dominant 57 y.o. male who presents to the office for a right hand mass. He notes a mass to the dorsal aspect of his right hand/wrist, which has been present for a while. He notes this is not significantly bothersome for him, except for when he golfs. He has a history of Non Hodgkins Lymphoma. Review of Systems   Constitutional:  Negative for chills, fever and unexpected weight change. HENT:  Negative for hearing loss, nosebleeds and sore throat. Eyes:  Negative for pain, redness and visual disturbance. Respiratory:  Negative for cough, shortness of breath and wheezing. Cardiovascular:  Negative for chest pain, palpitations and leg swelling. Gastrointestinal:  Negative for abdominal pain, nausea and vomiting. Endocrine: Negative for polydipsia and polyuria. Genitourinary:  Negative for difficulty urinating and hematuria. Musculoskeletal:  Negative for arthralgias, joint swelling and myalgias.    Skin:  Negative for rash and wound. Neurological:  Negative for dizziness, numbness and headaches. Psychiatric/Behavioral:  Negative for decreased concentration, dysphoric mood and suicidal ideas. The patient is not nervous/anxious.           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    Hypertension        Past Surgical History:   Procedure Laterality Date    FL GUIDED CENTRAL VENOUS ACCESS DEVICE INSERTION  10/12/2021    LYMPH NODE BIOPSY Right 9/15/2021    Procedure: EXCISIONAL BIOPSY OF RIGHT NECK LYMPH NODE WITH LYMPHOMA PROTOCOL;  Surgeon: Armando Ferrara MD;  Location: [facility];  Service: Surgical Oncology    TX INSJ TUNNELED CTR VAD W/SUBQ PORT AGE 7 YR/> Right 10/12/2021    Procedure: INSERTION VENOUS PORT ( PORT-A-CATH) IR;  Surgeon: Shawn Mcbride DO;  Location: [facility];  Service: Interventional Radiology    TOOTH EXTRACTION  02/16/2021       Family History   Problem Relation Age of Onset    Hypertension Family     Cancer Family        Social History     Occupational History    Not on file   Tobacco Use    Smoking status: Former     Packs/day: 0.50     Types: Cigarettes    Smokeless tobacco: Never    Tobacco comments:     Trying to Quit   Vaping Use    Vaping Use: Never used   Substance and Sexual Activity    Alcohol use: Not Currently     Comment: Rare Socially    Drug use: Never    Sexual activity: Not on file         Current Outpatient Medications:     acyclovir (ZOVIRAX) 800 mg tablet, Take 800 mg by mouth every 12 (twelve) hours, Disp: , Rfl:     ALPRAZolam (XANAX) 0.5 mg tablet, Take 0.5 mg by mouth daily at bedtime as needed for anxiety, Disp: , Rfl:     bisoprolol-hydrochlorothiazide (ZIAC) 5-6.25 MG per tablet, Take 1 tablet by mouth daily in the early morning , Disp: , Rfl:     famotidine (PEPCID) 20 mg tablet, Take 20 mg by mouth 2 (two) times a day As needed, Disp: , Rfl:     melatonin 3 mg, TAKE 1 TABLET BY MOUTH DAILY AT 8 PM., Disp: , Rfl:     nicotine polacrilex (NICORETTE) 4 mg gum, Chew 4 mg as needed for smoking cessation, Disp: , Rfl:     pyridoxine (B-6) 100 MG tablet, Take 100 mg by mouth daily, Disp: , Rfl:     sulfamethoxazole-trimethoprim (BACTRIM DS) 800-160 mg per tablet, Take 1 tablet by mouth 3 (three) times a week, Disp: , Rfl:     No Known Allergies    Objective:  Vitals:    11/17/23 0840   BP: 126/82   Pulse: 71       The patient was awake, alert, and oriented to person, place, and time. No acute distress. Normocephalic. EOMI. Mucous membranes moist.  Normal respiratory effort. Examination of the affected extremity was compared to the unaffected extremity. Skin was intact. No swelling or ecchymosis. No deformity. Hand and fingers were warm and well-perfused. Capillary refill was brisk. Full active range of motion of the elbows, forearms, wrists, and fingers. 5/5 wrist flexor/extensors and . Right hand mass measuring 10 mm in width, 7 mm in length and 2 mm from the skin surface, mass is firm and non mobile, mass is non tender to palpation, full composite fist     Imaging/Diagnostic Studies:    I reviewed imaging studies dated 11/17/23 which included multiple views. These images studies demonstrated a prominent metacarpal boss. No acute fracture or dislocation. No significant degenerative changes. Scribe Attestation      I,:  Mercedes Donaldet am acting as a scribe while in the presence of the attending physician.:       I,:  Sonal Nye MD personally performed the services described in this documentation    as scribed in my presence.:               This document was created using speech voice recognition software. Grammatical errors, random word insertions, pronoun errors, and incomplete sentences are an occasional consequence of this system due to software limitations, ambient noise, and hardware issues.    Any formal questions or concerns about content, text, or information contained within the body of this dictation should be directly addressed to the provider for clarification.

## 2023-11-21 ENCOUNTER — HOSPITAL ENCOUNTER (OUTPATIENT)
Dept: INFUSION CENTER | Facility: CLINIC | Age: 48
Discharge: HOME | End: 2023-11-21
Payer: COMMERCIAL

## 2023-11-21 DIAGNOSIS — C83.32 DIFFUSE LARGE B-CELL LYMPHOMA OF INTRATHORACIC LYMPH NODES (HCC): Primary | ICD-10-CM

## 2023-11-21 PROCEDURE — 96523 IRRIG DRUG DELIVERY DEVICE: CPT

## 2023-11-21 NOTE — PLAN OF CARE
Problem: Knowledge Deficit  Goal: Patient/family/caregiver demonstrates understanding of disease process, treatment plan, medications, and discharge instructions  Description: Complete learning assessment and assess knowledge base.   Interventions:  - Provide teaching at level of understanding  - Provide teaching via preferred learning methods  Outcome: Progressing patient

## 2023-11-21 NOTE — PROGRESS NOTES
Patient arrived for port flush. Port accessed and flushed per protocol. No complications. Patient declined printout.

## 2024-04-11 ENCOUNTER — HOSPITAL ENCOUNTER (OUTPATIENT)
Dept: INFUSION CENTER | Facility: CLINIC | Age: 49
Discharge: HOME | End: 2024-04-11
Payer: COMMERCIAL

## 2024-04-11 DIAGNOSIS — C83.32 DIFFUSE LARGE B-CELL LYMPHOMA OF INTRATHORACIC LYMPH NODES (HCC): Primary | ICD-10-CM

## 2024-04-11 PROCEDURE — 96523 IRRIG DRUG DELIVERY DEVICE: CPT

## 2024-04-11 NOTE — PROGRESS NOTES
Received for port flush and maintenance. No complaints offered. RPAC accessed without issue, flushes easily with good blood return noted. Pt to schedule next appt prior to leaving. AVS declined.

## 2025-02-14 ENCOUNTER — APPOINTMENT (EMERGENCY)
Dept: CT IMAGING | Facility: HOSPITAL | Age: 50
End: 2025-02-14
Payer: COMMERCIAL

## 2025-02-14 ENCOUNTER — ANESTHESIA (EMERGENCY)
Dept: PERIOP | Facility: HOSPITAL | Age: 50
End: 2025-02-14
Payer: COMMERCIAL

## 2025-02-14 ENCOUNTER — APPOINTMENT (EMERGENCY)
Dept: RADIOLOGY | Facility: HOSPITAL | Age: 50
End: 2025-02-14
Payer: COMMERCIAL

## 2025-02-14 ENCOUNTER — ANESTHESIA EVENT (EMERGENCY)
Dept: PERIOP | Facility: HOSPITAL | Age: 50
End: 2025-02-14
Payer: COMMERCIAL

## 2025-02-14 ENCOUNTER — HOSPITAL ENCOUNTER (OUTPATIENT)
Facility: HOSPITAL | Age: 50
Setting detail: OUTPATIENT SURGERY
Discharge: HOME | End: 2025-02-14
Attending: EMERGENCY MEDICINE
Payer: COMMERCIAL

## 2025-02-14 VITALS
OXYGEN SATURATION: 96 % | HEIGHT: 67 IN | RESPIRATION RATE: 18 BRPM | WEIGHT: 195.77 LBS | TEMPERATURE: 97.4 F | BODY MASS INDEX: 30.73 KG/M2 | SYSTOLIC BLOOD PRESSURE: 127 MMHG | HEART RATE: 84 BPM | DIASTOLIC BLOOD PRESSURE: 70 MMHG

## 2025-02-14 DIAGNOSIS — K37 APPENDICITIS: Primary | ICD-10-CM

## 2025-02-14 DIAGNOSIS — R11.2 NAUSEA AND VOMITING: ICD-10-CM

## 2025-02-14 DIAGNOSIS — R10.9 ABDOMINAL PAIN: ICD-10-CM

## 2025-02-14 LAB
ALBUMIN SERPL BCG-MCNC: 5.1 G/DL (ref 3.5–5)
ALP SERPL-CCNC: 65 U/L (ref 34–104)
ALT SERPL W P-5'-P-CCNC: 28 U/L (ref 7–52)
AMORPH URATE CRY URNS QL MICRO: NORMAL
ANION GAP SERPL CALCULATED.3IONS-SCNC: 9 MMOL/L (ref 4–13)
AST SERPL W P-5'-P-CCNC: 28 U/L (ref 13–39)
ATRIAL RATE: 64 BPM
BACTERIA UR QL AUTO: NORMAL /HPF
BASOPHILS # BLD AUTO: 0.03 THOUSANDS/ÂΜL (ref 0–0.1)
BASOPHILS NFR BLD AUTO: 0 % (ref 0–1)
BILIRUB SERPL-MCNC: 0.77 MG/DL (ref 0.2–1)
BILIRUB UR QL STRIP: NEGATIVE
BUN SERPL-MCNC: 16 MG/DL (ref 5–25)
CALCIUM SERPL-MCNC: 10 MG/DL (ref 8.4–10.2)
CARDIAC TROPONIN I PNL SERPL HS: 3 NG/L (ref ?–50)
CHLORIDE SERPL-SCNC: 101 MMOL/L (ref 96–108)
CLARITY UR: ABNORMAL
CO2 SERPL-SCNC: 26 MMOL/L (ref 21–32)
COLOR UR: YELLOW
CREAT SERPL-MCNC: 0.79 MG/DL (ref 0.6–1.3)
EOSINOPHIL # BLD AUTO: 0.03 THOUSAND/ÂΜL (ref 0–0.61)
EOSINOPHIL NFR BLD AUTO: 0 % (ref 0–6)
ERYTHROCYTE [DISTWIDTH] IN BLOOD BY AUTOMATED COUNT: 11.9 % (ref 11.6–15.1)
GFR SERPL CREATININE-BSD FRML MDRD: 106 ML/MIN/1.73SQ M
GLUCOSE SERPL-MCNC: 148 MG/DL (ref 65–140)
GLUCOSE UR STRIP-MCNC: NEGATIVE MG/DL
HCT VFR BLD AUTO: 45.3 % (ref 36.5–49.3)
HGB BLD-MCNC: 16.6 G/DL (ref 12–17)
HGB UR QL STRIP.AUTO: NEGATIVE
IMM GRANULOCYTES # BLD AUTO: 0.04 THOUSAND/UL (ref 0–0.2)
IMM GRANULOCYTES NFR BLD AUTO: 0 % (ref 0–2)
KETONES UR STRIP-MCNC: ABNORMAL MG/DL
LEUKOCYTE ESTERASE UR QL STRIP: NEGATIVE
LIPASE SERPL-CCNC: 43 U/L (ref 11–82)
LYMPHOCYTES # BLD AUTO: 0.74 THOUSANDS/ÂΜL (ref 0.6–4.47)
LYMPHOCYTES NFR BLD AUTO: 6 % (ref 14–44)
MCH RBC QN AUTO: 33.4 PG (ref 26.8–34.3)
MCHC RBC AUTO-ENTMCNC: 36.6 G/DL (ref 31.4–37.4)
MCV RBC AUTO: 91 FL (ref 82–98)
MONOCYTES # BLD AUTO: 0.78 THOUSAND/ÂΜL (ref 0.17–1.22)
MONOCYTES NFR BLD AUTO: 6 % (ref 4–12)
NEUTROPHILS # BLD AUTO: 10.87 THOUSANDS/ÂΜL (ref 1.85–7.62)
NEUTS SEG NFR BLD AUTO: 88 % (ref 43–75)
NITRITE UR QL STRIP: NEGATIVE
NON-SQ EPI CELLS URNS QL MICRO: NORMAL /HPF
NRBC BLD AUTO-RTO: 0 /100 WBCS
P AXIS: 53 DEGREES
PH UR STRIP.AUTO: 8 [PH]
PLATELET # BLD AUTO: 168 THOUSANDS/UL (ref 149–390)
PMV BLD AUTO: 9.5 FL (ref 8.9–12.7)
POTASSIUM SERPL-SCNC: 4.1 MMOL/L (ref 3.5–5.3)
PR INTERVAL: 162 MS
PROT SERPL-MCNC: 7.2 G/DL (ref 6.4–8.4)
PROT UR STRIP-MCNC: ABNORMAL MG/DL
QRS AXIS: 66 DEGREES
QRSD INTERVAL: 94 MS
QT INTERVAL: 392 MS
QTC INTERVAL: 404 MS
RBC # BLD AUTO: 4.97 MILLION/UL (ref 3.88–5.62)
RBC #/AREA URNS AUTO: NORMAL /HPF
SODIUM SERPL-SCNC: 136 MMOL/L (ref 135–147)
SP GR UR STRIP.AUTO: 1.02 (ref 1–1.03)
T WAVE AXIS: 60 DEGREES
UROBILINOGEN UR STRIP-ACNC: <2 MG/DL
VENTRICULAR RATE: 64 BPM
WBC # BLD AUTO: 12.49 THOUSAND/UL (ref 4.31–10.16)
WBC #/AREA URNS AUTO: NORMAL /HPF

## 2025-02-14 PROCEDURE — 80053 COMPREHEN METABOLIC PANEL: CPT

## 2025-02-14 PROCEDURE — 71045 X-RAY EXAM CHEST 1 VIEW: CPT

## 2025-02-14 PROCEDURE — 36415 COLL VENOUS BLD VENIPUNCTURE: CPT

## 2025-02-14 PROCEDURE — 74177 CT ABD & PELVIS W/CONTRAST: CPT

## 2025-02-14 PROCEDURE — 96365 THER/PROPH/DIAG IV INF INIT: CPT

## 2025-02-14 PROCEDURE — 96375 TX/PRO/DX INJ NEW DRUG ADDON: CPT

## 2025-02-14 PROCEDURE — 96367 TX/PROPH/DG ADDL SEQ IV INF: CPT

## 2025-02-14 PROCEDURE — 88304 TISSUE EXAM BY PATHOLOGIST: CPT | Performed by: PATHOLOGY

## 2025-02-14 PROCEDURE — 99285 EMERGENCY DEPT VISIT HI MDM: CPT | Performed by: EMERGENCY MEDICINE

## 2025-02-14 PROCEDURE — 44970 LAPAROSCOPY APPENDECTOMY: CPT | Performed by: SURGERY

## 2025-02-14 PROCEDURE — 96361 HYDRATE IV INFUSION ADD-ON: CPT

## 2025-02-14 PROCEDURE — 93005 ELECTROCARDIOGRAM TRACING: CPT

## 2025-02-14 PROCEDURE — 93010 ELECTROCARDIOGRAM REPORT: CPT | Performed by: INTERNAL MEDICINE

## 2025-02-14 PROCEDURE — 85025 COMPLETE CBC W/AUTO DIFF WBC: CPT

## 2025-02-14 PROCEDURE — 84484 ASSAY OF TROPONIN QUANT: CPT

## 2025-02-14 PROCEDURE — 81001 URINALYSIS AUTO W/SCOPE: CPT | Performed by: EMERGENCY MEDICINE

## 2025-02-14 PROCEDURE — 83690 ASSAY OF LIPASE: CPT

## 2025-02-14 PROCEDURE — 99285 EMERGENCY DEPT VISIT HI MDM: CPT

## 2025-02-14 PROCEDURE — 99223 1ST HOSP IP/OBS HIGH 75: CPT | Performed by: SURGERY

## 2025-02-14 PROCEDURE — 96366 THER/PROPH/DIAG IV INF ADDON: CPT

## 2025-02-14 RX ORDER — OXYCODONE HYDROCHLORIDE 5 MG/1
5 TABLET ORAL EVERY 4 HOURS PRN
Refills: 0 | Status: DISCONTINUED | OUTPATIENT
Start: 2025-02-14 | End: 2025-02-14 | Stop reason: HOSPADM

## 2025-02-14 RX ORDER — LIDOCAINE HYDROCHLORIDE 10 MG/ML
INJECTION, SOLUTION EPIDURAL; INFILTRATION; INTRACAUDAL; PERINEURAL AS NEEDED
Status: DISCONTINUED | OUTPATIENT
Start: 2025-02-14 | End: 2025-02-14

## 2025-02-14 RX ORDER — FENTANYL CITRATE/PF 50 MCG/ML
50 SYRINGE (ML) INJECTION
Status: DISCONTINUED | OUTPATIENT
Start: 2025-02-14 | End: 2025-02-14 | Stop reason: HOSPADM

## 2025-02-14 RX ORDER — METRONIDAZOLE 500 MG/100ML
500 INJECTION, SOLUTION INTRAVENOUS
Status: COMPLETED | OUTPATIENT
Start: 2025-02-15 | End: 2025-02-14

## 2025-02-14 RX ORDER — HYDROMORPHONE HCL IN WATER/PF 6 MG/30 ML
0.2 PATIENT CONTROLLED ANALGESIA SYRINGE INTRAVENOUS
Refills: 0 | Status: DISCONTINUED | OUTPATIENT
Start: 2025-02-14 | End: 2025-02-14 | Stop reason: HOSPADM

## 2025-02-14 RX ORDER — ROCURONIUM BROMIDE 10 MG/ML
INJECTION, SOLUTION INTRAVENOUS AS NEEDED
Status: DISCONTINUED | OUTPATIENT
Start: 2025-02-14 | End: 2025-02-14

## 2025-02-14 RX ORDER — ENOXAPARIN SODIUM 100 MG/ML
40 INJECTION SUBCUTANEOUS DAILY
Status: DISCONTINUED | OUTPATIENT
Start: 2025-02-14 | End: 2025-02-14 | Stop reason: HOSPADM

## 2025-02-14 RX ORDER — ONDANSETRON 2 MG/ML
4 INJECTION INTRAMUSCULAR; INTRAVENOUS EVERY 6 HOURS PRN
Status: DISCONTINUED | OUTPATIENT
Start: 2025-02-14 | End: 2025-02-14 | Stop reason: HOSPADM

## 2025-02-14 RX ORDER — SODIUM CHLORIDE, SODIUM LACTATE, POTASSIUM CHLORIDE, CALCIUM CHLORIDE 600; 310; 30; 20 MG/100ML; MG/100ML; MG/100ML; MG/100ML
125 INJECTION, SOLUTION INTRAVENOUS CONTINUOUS
Status: DISCONTINUED | OUTPATIENT
Start: 2025-02-14 | End: 2025-02-14 | Stop reason: HOSPADM

## 2025-02-14 RX ORDER — CEFAZOLIN SODIUM 2 G/50ML
2000 SOLUTION INTRAVENOUS EVERY 8 HOURS
Status: DISCONTINUED | OUTPATIENT
Start: 2025-02-14 | End: 2025-02-14

## 2025-02-14 RX ORDER — METOCLOPRAMIDE HYDROCHLORIDE 5 MG/ML
10 INJECTION INTRAMUSCULAR; INTRAVENOUS ONCE AS NEEDED
Status: DISCONTINUED | OUTPATIENT
Start: 2025-02-14 | End: 2025-02-14 | Stop reason: HOSPADM

## 2025-02-14 RX ORDER — HYDROMORPHONE HCL/PF 1 MG/ML
SYRINGE (ML) INJECTION AS NEEDED
Status: DISCONTINUED | OUTPATIENT
Start: 2025-02-14 | End: 2025-02-14

## 2025-02-14 RX ORDER — FENTANYL CITRATE 50 UG/ML
INJECTION, SOLUTION INTRAMUSCULAR; INTRAVENOUS AS NEEDED
Status: DISCONTINUED | OUTPATIENT
Start: 2025-02-14 | End: 2025-02-14

## 2025-02-14 RX ORDER — CEFAZOLIN SODIUM 2 G/50ML
2000 SOLUTION INTRAVENOUS
Status: COMPLETED | OUTPATIENT
Start: 2025-02-15 | End: 2025-02-14

## 2025-02-14 RX ORDER — SODIUM CHLORIDE 9 MG/ML
INJECTION, SOLUTION INTRAVENOUS AS NEEDED
Status: DISCONTINUED | OUTPATIENT
Start: 2025-02-14 | End: 2025-02-14 | Stop reason: HOSPADM

## 2025-02-14 RX ORDER — ONDANSETRON 2 MG/ML
4 INJECTION INTRAMUSCULAR; INTRAVENOUS ONCE
Status: COMPLETED | OUTPATIENT
Start: 2025-02-14 | End: 2025-02-14

## 2025-02-14 RX ORDER — SODIUM CHLORIDE, SODIUM GLUCONATE, SODIUM ACETATE, POTASSIUM CHLORIDE, MAGNESIUM CHLORIDE, SODIUM PHOSPHATE, DIBASIC, AND POTASSIUM PHOSPHATE .53; .5; .37; .037; .03; .012; .00082 G/100ML; G/100ML; G/100ML; G/100ML; G/100ML; G/100ML; G/100ML
125 INJECTION, SOLUTION INTRAVENOUS CONTINUOUS
Status: DISCONTINUED | OUTPATIENT
Start: 2025-02-14 | End: 2025-02-14 | Stop reason: HOSPADM

## 2025-02-14 RX ORDER — PROPOFOL 10 MG/ML
INJECTION, EMULSION INTRAVENOUS AS NEEDED
Status: DISCONTINUED | OUTPATIENT
Start: 2025-02-14 | End: 2025-02-14

## 2025-02-14 RX ORDER — KETOROLAC TROMETHAMINE 30 MG/ML
15 INJECTION, SOLUTION INTRAMUSCULAR; INTRAVENOUS ONCE
Status: COMPLETED | OUTPATIENT
Start: 2025-02-14 | End: 2025-02-14

## 2025-02-14 RX ORDER — OXYCODONE HYDROCHLORIDE 5 MG/1
5 TABLET ORAL EVERY 4 HOURS PRN
Qty: 10 TABLET | Refills: 0 | Status: SHIPPED | OUTPATIENT
Start: 2025-02-14 | End: 2025-02-24

## 2025-02-14 RX ORDER — DEXAMETHASONE SODIUM PHOSPHATE 10 MG/ML
INJECTION, SOLUTION INTRAMUSCULAR; INTRAVENOUS AS NEEDED
Status: DISCONTINUED | OUTPATIENT
Start: 2025-02-14 | End: 2025-02-14

## 2025-02-14 RX ORDER — MIDAZOLAM HYDROCHLORIDE 2 MG/2ML
INJECTION, SOLUTION INTRAMUSCULAR; INTRAVENOUS AS NEEDED
Status: DISCONTINUED | OUTPATIENT
Start: 2025-02-14 | End: 2025-02-14

## 2025-02-14 RX ORDER — ACETAMINOPHEN 10 MG/ML
1000 INJECTION, SOLUTION INTRAVENOUS ONCE
Status: COMPLETED | OUTPATIENT
Start: 2025-02-14 | End: 2025-02-14

## 2025-02-14 RX ORDER — ONDANSETRON 2 MG/ML
INJECTION INTRAMUSCULAR; INTRAVENOUS AS NEEDED
Status: DISCONTINUED | OUTPATIENT
Start: 2025-02-14 | End: 2025-02-14

## 2025-02-14 RX ORDER — ACETAMINOPHEN 325 MG/1
650 TABLET ORAL EVERY 6 HOURS PRN
Status: DISCONTINUED | OUTPATIENT
Start: 2025-02-14 | End: 2025-02-14 | Stop reason: HOSPADM

## 2025-02-14 RX ORDER — ONDANSETRON 2 MG/ML
4 INJECTION INTRAMUSCULAR; INTRAVENOUS EVERY 4 HOURS PRN
Status: DISCONTINUED | OUTPATIENT
Start: 2025-02-14 | End: 2025-02-14 | Stop reason: HOSPADM

## 2025-02-14 RX ORDER — HYDROMORPHONE HCL/PF 1 MG/ML
0.5 SYRINGE (ML) INJECTION
Status: DISCONTINUED | OUTPATIENT
Start: 2025-02-14 | End: 2025-02-14 | Stop reason: HOSPADM

## 2025-02-14 RX ORDER — HYDROMORPHONE HCL/PF 1 MG/ML
0.5 SYRINGE (ML) INJECTION
Refills: 0 | Status: DISCONTINUED | OUTPATIENT
Start: 2025-02-14 | End: 2025-02-14 | Stop reason: HOSPADM

## 2025-02-14 RX ORDER — BUPIVACAINE HYDROCHLORIDE AND EPINEPHRINE 2.5; 5 MG/ML; UG/ML
INJECTION, SOLUTION EPIDURAL; INFILTRATION; INTRACAUDAL; PERINEURAL AS NEEDED
Status: DISCONTINUED | OUTPATIENT
Start: 2025-02-14 | End: 2025-02-14 | Stop reason: HOSPADM

## 2025-02-14 RX ORDER — PROPOFOL 10 MG/ML
INJECTION, EMULSION INTRAVENOUS CONTINUOUS PRN
Status: DISCONTINUED | OUTPATIENT
Start: 2025-02-14 | End: 2025-02-14

## 2025-02-14 RX ORDER — KETOROLAC TROMETHAMINE 30 MG/ML
INJECTION, SOLUTION INTRAMUSCULAR; INTRAVENOUS AS NEEDED
Status: DISCONTINUED | OUTPATIENT
Start: 2025-02-14 | End: 2025-02-14

## 2025-02-14 RX ORDER — METRONIDAZOLE 500 MG/100ML
500 INJECTION, SOLUTION INTRAVENOUS EVERY 8 HOURS
Status: DISCONTINUED | OUTPATIENT
Start: 2025-02-14 | End: 2025-02-14

## 2025-02-14 RX ADMIN — IOHEXOL 80 ML: 350 INJECTION, SOLUTION INTRAVENOUS at 07:13

## 2025-02-14 RX ADMIN — ACETAMINOPHEN 1000 MG: 10 INJECTION INTRAVENOUS at 07:41

## 2025-02-14 RX ADMIN — DEXAMETHASONE SODIUM PHOSPHATE 10 MG: 10 INJECTION INTRAMUSCULAR; INTRAVENOUS at 12:31

## 2025-02-14 RX ADMIN — METRONIDAZOLE: 500 INJECTION, SOLUTION INTRAVENOUS at 12:30

## 2025-02-14 RX ADMIN — FENTANYL CITRATE 50 MCG: 50 INJECTION INTRAMUSCULAR; INTRAVENOUS at 13:13

## 2025-02-14 RX ADMIN — HYDROMORPHONE HYDROCHLORIDE 0.5 MG: 1 INJECTION, SOLUTION INTRAMUSCULAR; INTRAVENOUS; SUBCUTANEOUS at 12:31

## 2025-02-14 RX ADMIN — SODIUM CHLORIDE, SODIUM GLUCONATE, SODIUM ACETATE, POTASSIUM CHLORIDE, MAGNESIUM CHLORIDE, SODIUM PHOSPHATE, DIBASIC, AND POTASSIUM PHOSPHATE 125 ML/HR: .53; .5; .37; .037; .03; .012; .00082 INJECTION, SOLUTION INTRAVENOUS at 11:32

## 2025-02-14 RX ADMIN — PROPOFOL 100 MCG/KG/MIN: 10 INJECTION, EMULSION INTRAVENOUS at 12:24

## 2025-02-14 RX ADMIN — FENTANYL CITRATE 50 MCG: 50 INJECTION INTRAMUSCULAR; INTRAVENOUS at 12:24

## 2025-02-14 RX ADMIN — ONDANSETRON 4 MG: 2 INJECTION INTRAMUSCULAR; INTRAVENOUS at 06:18

## 2025-02-14 RX ADMIN — LIDOCAINE HYDROCHLORIDE 50 MG: 10 INJECTION, SOLUTION EPIDURAL; INFILTRATION; INTRACAUDAL at 12:24

## 2025-02-14 RX ADMIN — SODIUM CHLORIDE 1000 ML: 0.9 INJECTION, SOLUTION INTRAVENOUS at 06:18

## 2025-02-14 RX ADMIN — SUGAMMADEX 200 MG: 100 INJECTION, SOLUTION INTRAVENOUS at 13:04

## 2025-02-14 RX ADMIN — ONDANSETRON 4 MG: 2 INJECTION INTRAMUSCULAR; INTRAVENOUS at 12:31

## 2025-02-14 RX ADMIN — ROCURONIUM 50 MG: 50 INJECTION, SOLUTION INTRAVENOUS at 12:24

## 2025-02-14 RX ADMIN — KETOROLAC TROMETHAMINE 15 MG: 30 INJECTION, SOLUTION INTRAMUSCULAR; INTRAVENOUS at 06:17

## 2025-02-14 RX ADMIN — CEFAZOLIN SODIUM 2000 MG: 2 SOLUTION INTRAVENOUS at 12:25

## 2025-02-14 RX ADMIN — PROPOFOL 200 MG: 10 INJECTION, EMULSION INTRAVENOUS at 12:24

## 2025-02-14 RX ADMIN — KETOROLAC TROMETHAMINE 30 MG: 30 INJECTION, SOLUTION INTRAMUSCULAR; INTRAVENOUS at 13:09

## 2025-02-14 RX ADMIN — MIDAZOLAM HYDROCHLORIDE 2 MG: 1 INJECTION, SOLUTION INTRAMUSCULAR; INTRAVENOUS at 12:20

## 2025-02-14 NOTE — ED NOTES
Pt is feeling better, denies pain. Is ready to leave. Waiting for CT results     Karen Maddox RN  02/14/25 3851

## 2025-02-14 NOTE — ASSESSMENT & PLAN NOTE
Acute onset abdominal pain yesterday evening with associated nausea  Leukocytosis 12.5  CT with uncomplicated appendicitis    -general surgery admission  -NPO for OR  -IVF  -IV abx  -pain control PRN  Patient seen and examined with

## 2025-02-14 NOTE — ANESTHESIA POSTPROCEDURE EVALUATION
Post-Op Assessment Note    CV Status:  Stable    Pain management: adequate       Mental Status:  Sleepy   PONV Controlled:  None   Airway Patency:  Patent and adequate     Post Op Vitals Reviewed: Yes    No anethesia notable event occurred.    Staff: CRNA           Last Filed PACU Vitals:  Vitals Value Taken Time   Temp 97.2 °F (36.2 °C) 02/14/25 1327   Pulse 76 02/14/25 1328   /58 02/14/25 1327   Resp 18 02/14/25 1327   SpO2 97 % 02/14/25 1328   Vitals shown include unfiled device data.

## 2025-02-14 NOTE — ED CARE HANDOFF
Emergency Department Sign Out Note        Sign out and transfer of care from Dr. Peralta. See Separate Emergency Department note.     The patient, Sterling Rasmussen, was evaluated by the previous provider for abdominal pain.    Workup Completed:  Pt is a 49 y.o. male who presents to the ED on February 14, 2025. Patient presents with periumbilical abdominal pain for the last evening. Patient states that he was his normal state of health up until the evening prior to arrival, after which he began to have abdominal discomfort around the periumbilical region. Patient denies any nausea, vomiting, urinary dysfunction, dysuria, hematuria. Patient states that only possible inciting event included eating a old cheese quesadilla that was approximately 3 to 4 days old. Otherwise denied any food allergies, lactose intolerance, gluten sensitivity. Patient has history of diffuse B-cell lymphoma, currently in remission, not on chemotherapeutics. Patient also denied any diarrhea, constipation. Patient is passing flatus. Patient denied any prior abdominal surgeries, still has appendix. Upon evaluation, patient states that pain now is located in the right lower quadrant, however no pain is elicited on passive extension of the right hip joint. No recent travel, no known sick contacts. Patient did not take any over-the-counter pain medications including Tylenol or ibuprofen for pain relief prior to arriving in ED. Patient currently rates pain as 10/10. Patient otherwise denies any fever, chills, diaphoresis, chest pain, shortness of breath, dyspnea on exertion, nasal congestion, cough, flank pain, neurologic dysfunction, headache. Review of systems otherwise negative. No other concerns at this time.     -CBC, CMP, lipase, troponin, UA, EKG grossly unremarkable with exception of WBC 12.5.  -CT abdomen pelvis pending.    ED Course / Workup Pending (followup)  -CT abdomen pelvis revealed acute uncomplicated diverticulitis.  -Patient  made n.p.o. and counseled extensively on the findings  -I reached out to surgery who saw the patient at bedside and admitted him under their service.        HEART Risk Score      Flowsheet Row Most Recent Value   Heart Score Risk Calculator    History 1 Filed at: 02/14/2025 0749   ECG 1 Filed at: 02/14/2025 0749   Age 1 Filed at: 02/14/2025 0709   Risk Factors 1 Filed at: 02/14/2025 0709   Troponin 0 Filed at: 02/14/2025 0709   HEART Score 3 Filed at: 02/14/2025 0709                                    ED Course as of 02/14/25 1120   Fri Feb 14, 2025   0907 Patient reassessed, endorses significantly improved pain.   1054 Appendicitis on CT, patient made NPO and counseled, General Surgery to evaluate soon     Procedures  Medical Decision Making  -CT abdomen pelvis revealed acute uncomplicated diverticulitis.  -Patient made n.p.o. and counseled extensively on the findings  -I reached out to surgery who saw the patient at bedside and admitted him under their service.    Amount and/or Complexity of Data Reviewed  Labs: ordered.  Radiology: ordered and independent interpretation performed.    Risk  Prescription drug management.  Decision regarding hospitalization.            Disposition  Final diagnoses:   Abdominal pain   Nausea and vomiting   Appendicitis     Time reflects when diagnosis was documented in both MDM as applicable and the Disposition within this note       Time User Action Codes Description Comment    2/14/2025  8:00 AM Dalton Peralta Add [R10.9] Abdominal pain     2/14/2025  8:00 AM Dalton Peralta Add [R11.2] Nausea and vomiting     2/14/2025 11:04 AM Hugo Perez Add [K37] Appendicitis           ED Disposition       ED Disposition   Admit    Condition   Stable    Date/Time   Fri Feb 14, 2025 1104    Comment   Case was discussed with general surgery and the patient's admission status was agreed to be Admission Status: observation status to the service of general surgery .               Follow-up  Information    None       Patient's Medications   Discharge Prescriptions    No medications on file     No discharge procedures on file.       ED Provider  Electronically Signed by     Hugo Zaman MD  02/14/25 8450

## 2025-02-14 NOTE — ANESTHESIA PREPROCEDURE EVALUATION
Procedure:  APPENDECTOMY LAPAROSCOPIC (Abdomen)    Relevant Problems   CARDIO   (+) Hypertension      HEMATOLOGY   (+) Diffuse large B-cell lymphoma of intrathoracic lymph nodes (HCC)      NEURO/PSYCH   (+) Anxiety      PULMONARY   (+) Smoking      Other   (+) Supraclavicular lymphadenopathy        Physical Exam    Airway    Mallampati score: I  TM Distance: >3 FB  Neck ROM: full     Dental   No notable dental hx     Cardiovascular      Pulmonary      Other Findings        Anesthesia Plan  ASA Score- 3     Anesthesia Type- general with ASA Monitors.         Additional Monitors:     Airway Plan: ETT.           Plan Factors-Exercise tolerance (METS): >4 METS.    Chart reviewed. EKG reviewed.  Existing labs reviewed. Patient summary reviewed.    Patient is not a current smoker.      There is medical exclusion for perioperative obstructive sleep apnea risk education.        Induction- intravenous.    Postoperative Plan- Plan for postoperative opioid use.         Informed Consent- Anesthetic plan and risks discussed with patient.  I personally reviewed this patient with the CRNA. Discussed and agreed on the Anesthesia Plan with the CRNA..      NPO Status:  Vitals Value Taken Time   Date of last liquid 02/14/25 02/14/25 1150   Time of last liquid 0800 02/14/25 1150   Date of last solid 02/13/25 02/14/25 1150   Time of last solid 1300 02/14/25 1150

## 2025-02-14 NOTE — ED PROVIDER NOTES
Time reflects when diagnosis was documented in both MDM as applicable and the Disposition within this note       Time User Action Codes Description Comment    2/14/2025  8:00 AM Dalton Peralta [R10.9] Abdominal pain     2/14/2025  8:00 AM Dalton Peralta [R11.2] Nausea and vomiting           ED Disposition       None          Assessment & Plan       Medical Decision Making  This is a 48yo with abdominal pain and nausea and vomiting. Abdominal exam without peritoneal signs. No evidence of acute abdomen at this time, low suspicion for appendicitis given negative psoas sign and Rovsing sign. CT imaging still pending. Given work up, low suspicion for acute hepatobiliary disease (including acute cholecystitis or cholangitis), acute infectious processes (pneumonia, hepatitis, pyelonephritis), vascular catastrophe, bowel obstruction, or viscus perforation.  Patient given IV fluid bolus, IV Toradol, IV acetaminophen, pain significantly improved, nausea vomiting improved after administration of IV Zofran.  Patient's care transitioned to Dr. Hugo Zaman MD for continued care and disposition.    Amount and/or Complexity of Data Reviewed  Labs: ordered.  Radiology: ordered and independent interpretation performed.    Risk  Prescription drug management.        ED Course as of 02/14/25 0804   Fri Feb 14, 2025   0616 EKG Interpretation    Rate: 64 BPM  Rhythm: NSR  Axis: Normal  Intervals: Normal, no blocks, QTc 404 ms  Q waves: Normal  T waves: No T wave flattening or T wave inversions  ST segments: No ischemic changes    Impression: Nonspecific T wave abnormalities in longer evident in inferior leads when compared to prior on 5/4/2022    EKG for comparison: 5/4/2022    EKG interpreted by me.          Medications   ketorolac (TORADOL) injection 15 mg (15 mg Intravenous Given 2/14/25 0617)   ondansetron (ZOFRAN) injection 4 mg (4 mg Intravenous Given 2/14/25 0618)   sodium chloride 0.9 % bolus 1,000 mL (0 mL  Intravenous Stopped 2/14/25 0741)   iohexol (OMNIPAQUE) 350 MG/ML injection (MULTI-DOSE) 80 mL (80 mL Intravenous Given 2/14/25 0713)   acetaminophen (Ofirmev) injection 1,000 mg (1,000 mg Intravenous New Bag 2/14/25 0741)       ED Risk Strat Scores   HEART Risk Score      Flowsheet Row Most Recent Value   Heart Score Risk Calculator    History 1 Filed at: 02/14/2025 0749   ECG 1 Filed at: 02/14/2025 0749   Age 1 Filed at: 02/14/2025 0709   Risk Factors 1 Filed at: 02/14/2025 0709   Troponin 0 Filed at: 02/14/2025 0709   HEART Score 3 Filed at: 02/14/2025 0709          HEART Risk Score      Flowsheet Row Most Recent Value   Heart Score Risk Calculator    History 1 Filed at: 02/14/2025 0749   ECG 1 Filed at: 02/14/2025 0749   Age 1 Filed at: 02/14/2025 0709   Risk Factors 1 Filed at: 02/14/2025 0709   Troponin 0 Filed at: 02/14/2025 0709   HEART Score 3 Filed at: 02/14/2025 0709                                                History of Present Illness       Chief Complaint   Patient presents with    Abdominal Pain     C/o abdominal pain, nausea since 1400 yesterday       Past Medical History:   Diagnosis Date    Anxiety     Cancer (HCC)     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    Hypertension       Past Surgical History:   Procedure Laterality Date    FL GUIDED CENTRAL VENOUS ACCESS DEVICE INSERTION  10/12/2021    LYMPH NODE BIOPSY Right 9/15/2021    Procedure: EXCISIONAL BIOPSY OF RIGHT NECK LYMPH NODE WITH LYMPHOMA PROTOCOL;  Surgeon: Pamela Reddy MD;  [facility];  Service: Surgical Oncology    MS INSJ TUNNELED CTR VAD W/SUBQ PORT AGE 5 YR/> Right 10/12/2021    Procedure: INSERTION VENOUS PORT ( PORT-A-CATH) IR;  Surgeon: Rafael Delgado DO;  [facility];  Service: Interventional Radiology    TOOTH EXTRACTION  02/16/2021      Family History   Problem Relation Age of Onset    Hypertension Family     Cancer Family       Social History     Tobacco Use    Smoking status:  Former     Current packs/day: 0.50     Types: Cigarettes    Smokeless tobacco: Never    Tobacco comments:     Trying to Quit   Vaping Use    Vaping status: Never Used   Substance Use Topics    Alcohol use: Not Currently     Comment: Rare Socially    Drug use: Never      E-Cigarette/Vaping    E-Cigarette Use Never User       E-Cigarette/Vaping Substances      I have reviewed and agree with the history as documented.     Pt is a 49 y.o. male who presents to the ED on February 14, 2025. Patient presents with periumbilical abdominal pain for the last evening.  Patient states that he was his normal state of health up until the evening prior to arrival, after which he began to have abdominal discomfort around the periumbilical region.  Patient denies any nausea, vomiting, urinary dysfunction, dysuria, hematuria.  Patient states that only possible inciting event included eating a old cheese quesadilla that was approximately 3 to 4 days old.  Otherwise denied any food allergies, lactose intolerance, gluten sensitivity.  Patient has history of diffuse B-cell lymphoma, currently in remission, not on chemotherapeutics.  Patient also denied any diarrhea, constipation.  Patient is passing flatus.  Patient denied any prior abdominal surgeries, still has appendix.  Upon evaluation, patient states that pain now is located in the right lower quadrant, however no pain is elicited on passive extension of the right hip joint.  No recent travel, no known sick contacts.  Patient did not take any over-the-counter pain medications including Tylenol or ibuprofen for pain relief prior to arriving in ED.  Patient currently rates pain as 10/10. Patient otherwise denies any fever, chills, diaphoresis, chest pain, shortness of breath, dyspnea on exertion, nasal congestion, cough, flank pain, neurologic dysfunction, headache.  Review of systems otherwise negative.  No other concerns at this time.      Abdominal Pain      Review of Systems    Gastrointestinal:  Positive for abdominal pain.           Objective       ED Triage Vitals   Temperature Pulse Blood Pressure Respirations SpO2 Patient Position - Orthostatic VS   02/14/25 0541 02/14/25 0541 02/14/25 0541 02/14/25 0541 02/14/25 0541 02/14/25 0541   (!) 97.3 °F (36.3 °C) 74 156/83 18 97 % Sitting      Temp Source Heart Rate Source BP Location FiO2 (%) Pain Score    02/14/25 0541 02/14/25 0541 02/14/25 0541 -- 02/14/25 0723    Oral Monitor Right arm  5      Vitals      Date and Time Temp Pulse SpO2 Resp BP Pain Score FACES Pain Rating User   02/14/25 0723 -- 63 99 % 18 123/58 5 -- LAB   02/14/25 0541 97.3 °F (36.3 °C) 74 97 % 18 156/83 -- -- JF            Physical Exam  Vitals and nursing note reviewed.   Constitutional:       General: He is not in acute distress.     Appearance: He is well-developed.   HENT:      Head: Normocephalic and atraumatic.   Eyes:      Conjunctiva/sclera: Conjunctivae normal.   Cardiovascular:      Rate and Rhythm: Normal rate and regular rhythm.      Heart sounds: No murmur heard.     No friction rub. No gallop.   Pulmonary:      Effort: Pulmonary effort is normal. No respiratory distress.      Breath sounds: Normal breath sounds. No wheezing, rhonchi or rales.   Abdominal:      General: There is no distension.      Palpations: Abdomen is soft.      Tenderness: There is abdominal tenderness in the right lower quadrant and periumbilical area. There is no right CVA tenderness, left CVA tenderness, guarding or rebound. Negative signs include Oropeza's sign, McBurney's sign and psoas sign.      Hernia: There is no hernia in the umbilical area.      Comments: Patient is umbilical hernia, easily reducible, no evidence of incarceration or strangulation   Musculoskeletal:         General: No swelling.      Cervical back: Neck supple.   Skin:     General: Skin is warm and dry.      Capillary Refill: Capillary refill takes less than 2 seconds.   Neurological:      Mental Status: He  is alert.   Psychiatric:         Mood and Affect: Mood normal.         Results Reviewed       Procedure Component Value Units Date/Time    Urine Microscopic [[id number]] Collected: 02/14/25 0720    Lab Status: Final result Specimen: Urine, Clean Catch Updated: 02/14/25 0736     RBC, UA None Seen /hpf      WBC, UA None Seen /hpf      Epithelial Cells Occasional /hpf      Bacteria, UA None Seen /hpf      Amorphous Crystals, UA Moderate    UA w Reflex to Microscopic w Reflex to Culture [[id number]]  (Abnormal) Collected: 02/14/25 0720    Lab Status: Final result Specimen: Urine, Clean Catch Updated: 02/14/25 0732     Color, UA Yellow     Clarity, UA Turbid     Specific Gravity, UA 1.025     pH, UA 8.0     Leukocytes, UA Negative     Nitrite, UA Negative     Protein, UA Trace mg/dl      Glucose, UA Negative mg/dl      Ketones, UA Trace mg/dl      Urobilinogen, UA <2.0 mg/dl      Bilirubin, UA Negative     Occult Blood, UA Negative    HS Troponin 0hr (reflex protocol) [[id number]]  (Normal) Collected: 02/14/25 0613    Lab Status: Final result Specimen: Blood from Arm, Left Updated: 02/14/25 0653     hs TnI 0hr 3 ng/L     Comprehensive metabolic panel [[id number]]  (Abnormal) Collected: 02/14/25 0613    Lab Status: Final result Specimen: Blood from Arm, Left Updated: 02/14/25 0649     Sodium 136 mmol/L      Potassium 4.1 mmol/L      Chloride 101 mmol/L      CO2 26 mmol/L      ANION GAP 9 mmol/L      BUN 16 mg/dL      Creatinine 0.79 mg/dL      Glucose 148 mg/dL      Calcium 10.0 mg/dL      AST 28 U/L      ALT 28 U/L      Alkaline Phosphatase 65 U/L      Total Protein 7.2 g/dL      Albumin 5.1 g/dL      Total Bilirubin 0.77 mg/dL      eGFR 106 ml/min/1.73sq m     Narrative:      National Kid[employer] Foundation guidelines for Chronic Kidney Disease (CKD):     Stage 1 with normal or high GFR (GFR > 90 mL/min/1.73 square meters)    Stage 2 Mild CKD (GFR = 60-89 mL/min/1.73 square meters)    Stage 3A Moderate CKD (GFR = 45-59  mL/min/1.73 square meters)    Stage 3B Moderate CKD (GFR = 30-44 mL/min/1.73 square meters)    Stage 4 Severe CKD (GFR = 15-29 mL/min/1.73 square meters)    Stage 5 End Stage CKD (GFR <15 mL/min/1.73 square meters)  Note: GFR calculation is accurate only with a steady state creatinine    Lipase [[id number]]  (Normal) Collected: 02/14/25 0613    Lab Status: Final result Specimen: Blood from Arm, Left Updated: 02/14/25 0649     Lipase 43 u/L     CBC and differential [[id number]]  (Abnormal) Collected: 02/14/25 0613    Lab Status: Final result Specimen: Blood from Arm, Left Updated: 02/14/25 0635     WBC 12.49 Thousand/uL      RBC 4.97 Million/uL      Hemoglobin 16.6 g/dL      Hematocrit 45.3 %      MCV 91 fL      MCH 33.4 pg      MCHC 36.6 g/dL      RDW 11.9 %      MPV 9.5 fL      Platelets 168 Thousands/uL      nRBC 0 /100 WBCs      Segmented % 88 %      Immature Grans % 0 %      Lymphocytes % 6 %      Monocytes % 6 %      Eosinophils Relative 0 %      Basophils Relative 0 %      Absolute Neutrophils 10.87 Thousands/µL      Absolute Immature Grans 0.04 Thousand/uL      Absolute Lymphocytes 0.74 Thousands/µL      Absolute Monocytes 0.78 Thousand/µL      Eosinophils Absolute 0.03 Thousand/µL      Basophils Absolute 0.03 Thousands/µL             XR chest 1 view portable   ED Interpretation by Dalton Peralta MD (02/14 0643)   No acute cardiopulmonary disease      CT abdomen pelvis with contrast    (Results Pending)       Procedures    ED Medication and Procedure Management   Prior to Admission Medications   Prescriptions Last Dose Informant Patient Reported? Taking?   ALPRAZolam (XANAX) 0.5 mg tablet  Self Yes No   Sig: Take 0.5 mg by mouth daily at bedtime as needed for anxiety   acyclovir (ZOVIRAX) 800 mg tablet   Yes No   Sig: Take 800 mg by mouth every 12 (twelve) hours   bisoprolol-hydrochlorothiazide (ZIAC) 5-6.25 MG per tablet  Self Yes No   Sig: Take 1 tablet by mouth daily in the early morning    famotidine  (PEPCID) 20 mg tablet  Self Yes No   Sig: Take 20 mg by mouth 2 (two) times a day As needed   melatonin 3 mg   Yes No   Sig: TAKE 1 TABLET BY MOUTH DAILY AT 8 PM.   nicotine polacrilex (NICORETTE) 4 mg gum   Yes No   Sig: Chew 4 mg as needed for smoking cessation   pyridoxine (B-6) 100 MG tablet   Yes No   Sig: Take 100 mg by mouth daily   sulfamethoxazole-trimethoprim (BACTRIM DS) 800-160 mg per tablet   Yes No   Sig: Take 1 tablet by mouth 3 (three) times a week      Facility-Administered Medications: None     Patient's Medications   Discharge Prescriptions    No medications on file     No discharge procedures on file.  ED SEPSIS DOCUMENTATION   Time reflects when diagnosis was documented in both MDM as applicable and the Disposition within this note       Time User Action Codes Description Comment    2/14/2025  8:00 AM Dalton Peralta [R10.9] Abdominal pain     2/14/2025  8:00 AM Dalton Peralta [R11.2] Nausea and vomiting                  Dalton Peralta MD  02/14/25 0804

## 2025-02-14 NOTE — ED ATTENDING ATTESTATION
2/14/2025  I, Lenny Armendariz MD, saw and evaluated the patient. I have discussed the patient with the resident/non-physician practitioner and agree with the resident's/non-physician practitioner's findings, Plan of Care, and MDM as documented in the resident's/non-physician practitioner's note, except where noted. All available labs and Radiology studies were reviewed.  I was present for key portions of any procedure(s) performed by the resident/non-physician practitioner and I was immediately available to provide assistance.       At this point I agree with the current assessment done in the Emergency Department.  I have conducted an independent evaluation of this patient a history and physical is as follows: Patient is a 49 year old male with about 15-16 hours of constant worsening periumbilical abdominal pain. (+) nausea and dry heaves. No diarrhea. Last BM yesterday AM. (+) flatus. No abdominal surgery. No GI bleeding. No fever. (+) decreased appetite. Pain made worse by bumps in car ride here. Was last seen at [facility] on 1/3/25 for diffuse B-cell lymphoma. [employer] website checked on this patient and last Rx filled was on 1/5/25 for xanax for 30 day supply. NCAT. No scleral icterus. Mucous membranes somewhat moist. Lungs clear. Heart regular without murmur. Abdomen soft with diffuse bilateral lower abdominal tenderness. No guarding or rebound. No edema. No rash noted. No jaundice. DDx including but not limited to: appendicitis, gastroenteritis, gastritis, PUD, GERD, gastroparesis, hepatitis, pancreatitis, colitis, enteritis, diverticulitis, food poisoning, epiploic appendagitis, mesenteric adenitis, mesenteric panniculitis, mesenteric ischemia, IBD, IBS, ileus, bowel obstruction, volvulus, internal hernia, cholecystitis, biliary colic, choledocholithiasis, perforated viscus, tumor, splenic etiology, constipation; doubt AAA, testicular torsion; renal colic, pyelonephritis, UTI; doubt cardiac  etiology. Will check EKG, labs, CXR and CT and give IVFs and IV zofran and IV toradol.     ED Course         Critical Care Time  Procedures

## 2025-02-14 NOTE — H&P
H&P - Surgery-General   Name: Sterling Rasmussen 49 y.o. male I MRN: [id number]  Unit/Bed#: ED-08 I Date of Admission: 2/14/2025   Date of Service: 2/14/2025 I Hospital Day: 0     Assessment & Plan  Appendicitis  Acute onset abdominal pain yesterday evening with associated nausea  Leukocytosis 12.5  CT with uncomplicated appendicitis    -general surgery admission  -NPO for OR  -IVF  -IV abx  -pain control PRN  Patient seen and examined with       History of Present Illness   Sterling Rasmussen is a 49 y.o. male with pmhx of nonhodgkin's lymphoma s/p chemo/radiation and stem cell transplant who presents with acute onset abdominal pain yesterday evening around 4pm, in the umbilical region. Thought the pain was related to gas, took OTC medications with relief. No vomiting, diarrhea, fevers or chills. Does report nausea with dry heaves.    No anticoagulation use. No intra-abdominal surgeries     Review of Systems   Constitutional:  Negative for activity change, appetite change, chills and fever.   Gastrointestinal:  Positive for abdominal pain and nausea. Negative for constipation, diarrhea and vomiting.     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    Hypertension      Past Surgical History:   Procedure Laterality Date    FL GUIDED CENTRAL VENOUS ACCESS DEVICE INSERTION  10/12/2021    LYMPH NODE BIOPSY Right 9/15/2021    Procedure: EXCISIONAL BIOPSY OF RIGHT NECK LYMPH NODE WITH LYMPHOMA PROTOCOL;  Surgeon: Pamela Reddy MD;  [facility];  Service: Surgical Oncology    OK INSJ TUNNELED CTR VAD W/SUBQ PORT AGE 7 YR/> Right 10/12/2021    Procedure: INSERTION VENOUS PORT ( PORT-A-CATH) IR;  Surgeon: Rafael Delgado DO;  [facility];  Service: Interventional Radiology    TOOTH EXTRACTION  02/16/2021     Social History     Tobacco Use    Smoking status: Former     Current packs/day:  0.50     Types: Cigarettes    Smokeless tobacco: Never    Tobacco comments:     Trying to Quit   Vaping Use    Vaping status: Never Used   Substance and Sexual Activity    Alcohol use: Not Currently     Comment: Rare Socially    Drug use: Never    Sexual activity: Not on file     E-Cigarette/Vaping    E-Cigarette Use Never User      E-Cigarette/Vaping Substances     Family History   Problem Relation Age of Onset    Hypertension Family     Cancer Family        Objective :  Temp:  [97.3 °F (36.3 °C)] 97.3 °F (36.3 °C)  HR:  [63-74] 63  BP: (123-156)/(58-83) 123/58  Resp:  [18] 18  SpO2:  [97 %-99 %] 99 %  O2 Device: None (Room air)      Physical Exam  Constitutional:       General: He is not in acute distress.     Appearance: He is normal weight. He is not ill-appearing or toxic-appearing.   Cardiovascular:      Rate and Rhythm: Normal rate.   Pulmonary:      Effort: Pulmonary effort is normal. No respiratory distress.   Abdominal:      General: Abdomen is flat. There is no distension.      Palpations: Abdomen is soft.      Tenderness: There is abdominal tenderness in the periumbilical area. There is no guarding or rebound.      Hernia: A hernia is present. Hernia is present in the umbilical area.   Skin:     General: Skin is warm and dry.   Neurological:      General: No focal deficit present.      Mental Status: He is alert.   Psychiatric:         Mood and Affect: Mood normal.         Behavior: Behavior normal.       Lab Results: I have reviewed the following results:  Recent Labs     02/14/25  0613   WBC 12.49*   HGB 16.6   HCT 45.3      SODIUM 136   K 4.1      CO2 26   BUN 16   CREATININE 0.79   GLUC 148*   AST 28   ALT 28   ALB 5.1*   TBILI 0.77   ALKPHOS 65   HSTNI0 3       Imaging Results Review: I reviewed radiology reports from this admission including: CT abdomen/pelvis.  Other Study Results Review: No additional pertinent studies reviewed.    VTE Pharmacologic Prophylaxis: VTE covered  by:    None     VTE Mechanical Prophylaxis: sequential compression device

## 2025-02-14 NOTE — DISCHARGE INSTR - AVS FIRST PAGE
Laparoscopic Appendectomy      WHAT YOU SHOULD KNOW:    Laparoscopic appendectomy is surgery to remove your appendix. During this surgery, small incisions are made in your abdomen. A small scope and special tools are inserted through these incisions. Your appendix is removed from you bowels and taken out of your abdomen.  The incisions are closed with sutures and a small amount of glue is applied over top incisions to help reinforce the incisions to optimize healing.           AFTER YOU LEAVE: Following discharge from the hospital, you may have some questions about your procedure, your activities or your general condition.  These instructions may answer some of your questions and help you adjust during the first few days following your operation.  You can expect to be sore and tender mostly around the incisions. This pain should last approximally 5 days and gradually improve daily.          Incisions:Your doctor may have chosen to use a type of adhesive glue, to close your incision.The glue is used to cover the incision, assist in closure, and prevent contamination so to optimize healing. This adhesive glue will darken and may appear as a purple film over the incision.  Do not pick at the glue, it will peel away on its own within one to two weeks.    You may apply ice to the incisions to help with pain. Avoid heat as this my make the glue tacky   It is normal to have some bruising, swelling or mild discoloration around the incision.  If increasing redness or pain develops, call our office immediately.   You may wash the incision gently with soap and water then pat dry.    Do not apply any creams or ointments.     Dressings: You do not usually need to keep incisions covered with a dressing.  A dressing is only required if you had a drain following your surgery and the drain was removed prior to discharge.  If a dressing is required the doctor will discuss the dressing with prior to leaving. You may remove the  dressing for showering, but reapply when dry.     Bathing: You may shower daily with soap and water the day after the procedure. It is OK to GENTLY wash the incision with soap and water then pat dry.  Do NOT soak incision in a tub, pool, or hot tub for 2 weeks.    Diet: Resume your normal diet unless specified otherwise.  We recommend you slowly advance your diet. Try to start with softer bland foods and gradually advance as tolerated. Be sure to consume plenty of water.  Avoid alcohol.        Activity/Restrictions: The evening following the procedure you should rest as much as possible, sitting, lying or reclining.  you should be sure someone remains with you until the next morning.  Gradually increase your activity daily. Walking 3-4 daily is good and  stairs are ok.  Listen to your body.  If you start to get tired or sore then rest.   No strenuous activity or exercise for 3-4 weeks.   No driving for 5 days or while taking narcotics for pain.       Return or work: You may return to work or other activities as soon as your pain is controlled and you feel comfortable. For many people, this is 5 to 7 days after surgery. If your job requires heavy lifting you will need to be on light duty for 2-3 weeks.     Follow up appointment: following discharge from the hospital call the office in 1-2 days to set up a post operative appointment to be seen in 2-3 weeks.  The phone number and address should be provided in your discharge paper work.    Medication: Please take all medications as prescribed. Call your healthcare provider if you think your medicine is not helping or if you have side effects.   If you were given a prescription for Percocet, Norco, or Vicodin for pain be sure to eat prior to taking as these medications as they may cause nausea and vomiting on an empty stomach.    DO NOT take Tylenol with these medication for a fever or for further pain control as these medications already contain Tylenol in them.        Contact your healthcare provider if:   You have a fever over 101°F (38°C) or chills.    You have pain or nausea that is not relieved by medicine.    You have redness and swelling around your incisions, or blood or pus is leaking             from your incisions.    You are constipated or have diarrhea.    Your skin or eyes are yellow, or your bowel movements are pale.    You have questions or concerns about your surgery, condition, or care.  Seek care immediately or call 911 if:   You cannot stop vomiting.    Your bowel movements are black or bloody.    You have pain in your abdomen and it is swollen or hard.    Your arm or leg feels warm, tender, and painful. It may look swollen and red.   You feel lightheaded, short of breath, and have chest pain.    You cough up blood.

## 2025-02-14 NOTE — OP NOTE
OPERATIVE REPORT  PATIENT NAME: Sterling Rasmussen    :  1975  MRN: [id number]  Pt Location: AN OR ROOM 05    SURGERY DATE: 2025    Surgeons and Role:     * Mason Burnett DO - Primary     * Dionte Holcomb MD - Assisting    Preop Diagnosis:  Acute appendicitis  Umbilical hernia    Postoperative diagnosis: Acute appendicitis, umbilical hernia    Procedure(s):  APPENDECTOMY LAPAROSCOPIC  Umbilical hernia repair, no mesh    Specimen(s):  ID Type Source Tests Collected by Time Destination   1 :  Tissue Appendix TISSUE EXAM Mason Burnett DO 2025 1303        Estimated Blood Loss:   Minimal    Drains:  Urethral Catheter Double-lumen;Latex 16 Fr. (Active)   Number of days: 0       Anesthesia Type:   General    Operative Indications:  Acute appendicitis, umbilical hernia    Operative Findings:  Inflamed appendix without signs of perforation.  No other intra-abdominal pathology noted.  1 cm umbilical hernia.  Repaired with suture only.  Height 67 inches weight 89 kg / 196 pounds.  BMI 31.  ASA 2.  Wound class II      Complications:   None    Procedure and Technique:  Patient was brought the operative suite and identified by visualization, conversation, by armband.  Sequential compression pumps were placed.  He was given Ancef and Flagyl perioperatively.  Once under general anesthesia Ordaz catheter was placed under sterile technique.  Abdomen was then prepped and draped in a sterile fashion.  Timeout was performed and it was assured that the prep was dry.  Local was instilled about the umbilicus.  Curvilinear skin incision was made from about the 7:00 to the 12 o'clock position.  Underlying subcutaneous tissue was sharply dissected with scissors to identify small fat-containing hernia.  This was reduced and a 12 mm trocar was placed through the hernia defect.  CO2 was attached crating a pneumoperitoneum 5 mm bladeless trocar was placed in the suprapubic as well as right lower quadrant area.   Obviously inflamed appendix was noted.  Possible third was grasped with a laparoscopic Allis and lifted anteriorly.  Divided mesoappendix with variable speed of the sono incision.  There was excellent hemostasis.  Appendix was then taken off of the cecum with a purple loaded Medtronic Endo BABS stapler.  Again there was good hemostasis.  Appendix was placed into the Endo Catch bag.  Irrigation was carried out of the pelvis as well as along the right gutter.  Again no other pathology was noted.  Appendix is brought to the umbilical port/hernia site.  Pneumoperitoneum was released.  The trocars removed.  Fascia at the umbilical trocar/hernia site was closed using 0 Vicryl in a figure-of-eight fashion x 2.  Local was instilled.  4-0 Monocryl was used to close all skin incisions in a subcuticular fashion.  Wounds were washed and dried.  Sterile skin glue was applied.  He was awakened in the operating room returned to the recovery area in stable condition having tolerated the procedure well.   I was present for the entire procedure.    Patient Disposition:  PACU                SIGNATURE: Mason Burnett DO  DATE: February 14, 2025  TIME: 1:13 PM

## 2025-02-14 NOTE — ED CARE HANDOFF
Emergency Department Sign Out Note        Sign out and transfer of care from Dr. Armendariz at 0800. See Separate Emergency Department note.     The patient, Sterling Rasmussen, was evaluated by the previous provider for abdominal pain.    Workup Completed:  labs    ED Course / Workup Pending (followup):  CT pending at time of signout.     CT w/ appendicitis.   Patient admitted to surgery service.       HEART Risk Score      Flowsheet Row Most Recent Value   Heart Score Risk Calculator    History 1 Filed at: 02/14/2025 0749   ECG 1 Filed at: 02/14/2025 0749   Age 1 Filed at: 02/14/2025 0709   Risk Factors 1 Filed at: 02/14/2025 0709   Troponin 0 Filed at: 02/14/2025 0709   HEART Score 3 Filed at: 02/14/2025 0709                                       Procedures  Medical Decision Making  Amount and/or Complexity of Data Reviewed  Labs: ordered.  Radiology: ordered and independent interpretation performed.    Risk  Prescription drug management.  Decision regarding hospitalization.            Disposition  Final diagnoses:   Abdominal pain   Nausea and vomiting   Appendicitis     Time reflects when diagnosis was documented in both MDM as applicable and the Disposition within this note       Time User Action Codes Description Comment    2/14/2025  8:00 AM Dalton Peralta Add [R10.9] Abdominal pain     2/14/2025  8:00 AM Dalton Peralta Add [R11.2] Nausea and vomiting     2/14/2025 11:04 AM Hugo Perez Add [K37] Appendicitis           ED Disposition       ED Disposition   Admit    Condition   Stable    Date/Time   Fri Feb 14, 2025 1104    Comment   Case was discussed with general surgery and the patient's admission status was agreed to be Admission Status: observation status to the service of general surgery .               Follow-up Information    None       Patient's Medications   Discharge Prescriptions    No medications on file     No discharge procedures on file.       ED Provider  Electronically Signed by      Hugo Perez MD  02/14/25 1108

## 2025-02-17 NOTE — ANESTHESIA POSTPROCEDURE EVALUATION
Post-Op Assessment Note    CV Status:  Stable    Pain management: adequate       Mental Status:  Alert and awake   Hydration Status:  Euvolemic   PONV Controlled:  Controlled   Airway Patency:  Patent     Post Op Vitals Reviewed: Yes    No anethesia notable event occurred.    Staff: Anesthesiologist, with CRNAs           Last Filed PACU Vitals:  Vitals Value Taken Time   Temp 97.4 °F (36.3 °C) 02/14/25 1400   Pulse 82 02/14/25 1400   /80 02/14/25 1400   Resp 18 02/14/25 1400   SpO2 96 % 02/14/25 1400       Modified Pan:     Vitals Value Taken Time   Activity 2 02/14/25 1400   Respiration 2 02/14/25 1400   Circulation 2 02/14/25 1400   Consciousness 2 02/14/25 1400   Oxygen Saturation 2 02/14/25 1400     Modified Pan Score: 10

## 2025-02-18 ENCOUNTER — NURSE TRIAGE (OUTPATIENT)
Age: 50
End: 2025-02-18

## 2025-02-18 PROCEDURE — 88304 TISSUE EXAM BY PATHOLOGIST: CPT | Performed by: PATHOLOGY

## 2025-02-18 NOTE — TELEPHONE ENCOUNTER
"Pt of Dr. Burnett s/p   APPENDECTOMY LAPAROSCOPIC, UMBILICAL HERNIA REPAIR NO MESH (Abdomen)   2/14/25-    Pt calling in to report upper left abdomen pain and \"gurgling\" / pt reports this is likely gas pains and is wondering if it is normal to have them still 4 days after surgery. Pt reports the pain is \"moderate discomfort\" but not sharp or constant.   Advised that it is normal to have gas pains and encouraged pt to take walks and ensure he is staying well hydrated with at least 64oz of fluid. Pt reports he has had normal bowel movements.   Taking ibuprofen during the day and oxycodone at night. Advised he may also take tylenol for extra relief.     Pt verbalized understanding no other questions at this time.       Additional Information  • Negative: Suture or staple came out early  • Negative: Mild bruising near incision site  • Negative: Suture removal date, questions about  • Negative: Skin tape (e.g., Steri-Strips) removal, questions about  • Negative: Sutured or stapled surgical incision, questions about  • Negative: Surgical incision after sutures or staples removed, questions about    Answer Assessment - Initial Assessment Questions  1. SYMPTOM: \"What's the main symptom you're concerned about?\" (e.g., drainage, incision opened up, pain, redness)      \"Gas pains\"   2. ONSET: \"When did pains  start?\"      Post op 2/14/25  3. SURGERY: \"What surgery did you have?\"      APPENDECTOMY LAPAROSCOPIC, UMBILICAL HERNIA REPAIR NO MESH (Abdomen)  4. DATE of SURGERY: \"When was the surgery?\"       2/14/25    6. REDNESS: \"Is there any redness at the incision site?\" If Yes, ask: \"How wide across is the redness?\" (Inches, centimeters)       denies  7. PAIN: \"Is there any pain?\" If Yes, ask: \"How bad is it?\"  (Scale 1-10; or mild, moderate, severe)      Mild to moderate  8. BLEEDING: \"Is there any bleeding?\" If Yes, ask: \"How much?\" and \"Where?\"      denies  9. DRAINAGE: \"Is there any drainage from the incision site?\" If Yes, " "ask: \"What color and how much?\" (e.g., red, cloudy, pus; drops, teaspoon)      denies  10. FEVER: \"Do you have a fever?\" If Yes, ask: \"What is your temperature, how was it measured, and when did it start?\"        denies  11. OTHER SYMPTOMS: \"Do you have any other symptoms?\" (e.g., dizziness, rash elsewhere on body, shaking chills, weakness)        denies    Protocols used: Post-Op Incision Symptoms and Questions-Adult-[place]    "

## 2025-02-27 ENCOUNTER — OFFICE VISIT (OUTPATIENT)
Dept: SURGERY | Facility: CLINIC | Age: 50
End: 2025-02-27

## 2025-02-27 DIAGNOSIS — K35.80 ACUTE APPENDICITIS: Primary | ICD-10-CM

## 2025-02-27 DIAGNOSIS — K42.9 UMBILICAL HERNIA: ICD-10-CM

## 2025-02-27 PROCEDURE — 99024 POSTOP FOLLOW-UP VISIT: CPT | Performed by: SURGERY

## 2025-02-27 NOTE — LETTER
2025     Roman العراقي MD  [address] [place] [id number]    Patient: Sterling Rasmussen   YOB: 1975   Date of Visit: 2025       Dear Dr. العراقي:    Thank you for referring Sterling Rasmussen to me for evaluation. Below are my notes for this consultation.    If you have questions, please do not hesitate to call me. I look forward to following your patient along with you.         Sincerely,        Mason Burnett DO        CC: No Recipients    Mason Burnett DO  2025  1:24 PM  Sign when Signing Visit  Name: Sterling Rasmussen      : 1975      MRN: [id number]  Encounter Provider: Mason Burnett DO  Encounter Date: 2025   Encounter department: [facility] [place]  :  Assessment & Plan  Acute appendicitis  Status post laparoscopic appendectomy.  Doing well.  Pathology reviewed.  May resume diet and activity as tolerated       Umbilical hernia  Incidentally noted at the time of his laparoscopic appendectomy.  Pair.           History of Present Illness  HPI  Sterling Rasmussen is a 49 y.o. male who presents post operatively.  Laparoscopic appendectomy and umbilical hernia repair no mesh 2025.  Notes some upper abdominal bloating for almost a week after surgery.  This is all resolved.  Final Diagnosis   A. Appendix, Appendectomy:  - Acute appendicitis with periappendicitis.          History obtained from: patient    Review of Systems   All other systems reviewed and are negative.    Medical History Reviewed by provider this encounter:     .     Objective  There were no vitals taken for this visit.     Physical Exam  Abdominal:      General: Abdomen is flat.      Palpations: Abdomen is soft.      Comments: Well-healed trocar sites.   Neurological:      Mental Status: He is alert.

## 2025-02-27 NOTE — PROGRESS NOTES
Name: Sterling Rasmussen      : 1975      MRN: [id number]  Encounter Provider: Mason Burnett DO  Encounter Date: 2025   Encounter department: [facility] [place]  :  Assessment & Plan  Acute appendicitis  Status post laparoscopic appendectomy.  Doing well.  Pathology reviewed.  May resume diet and activity as tolerated       Umbilical hernia  Incidentally noted at the time of his laparoscopic appendectomy.  Pair.           History of Present Illness   HPI  Sterling Rasmussen is a 49 y.o. male who presents post operatively.  Laparoscopic appendectomy and umbilical hernia repair no mesh 2025.  Notes some upper abdominal bloating for almost a week after surgery.  This is all resolved.  Final Diagnosis   A. Appendix, Appendectomy:  - Acute appendicitis with periappendicitis.          History obtained from: patient    Review of Systems   All other systems reviewed and are negative.    Medical History Reviewed by provider this encounter:     .     Objective   There were no vitals taken for this visit.     Physical Exam  Abdominal:      General: Abdomen is flat.      Palpations: Abdomen is soft.      Comments: Well-healed trocar sites.   Neurological:      Mental Status: He is alert.

## (undated) DEVICE — PACK PBDS LAP CHOLE RF

## (undated) DEVICE — COVER PROBE INTRAOPERATIVE 6 X 96 IN

## (undated) DEVICE — GLOVE SRG BIOGEL 6.5

## (undated) DEVICE — REM POLYHESIVE ADULT PATIENT RETURN ELECTRODE: Brand: VALLEYLAB

## (undated) DEVICE — SYRINGE 5ML LL

## (undated) DEVICE — TROCAR: Brand: KII FIOS FIRST ENTRY

## (undated) DEVICE — SUT VICRYL 2-0 SH 27 IN UNDYED J417H

## (undated) DEVICE — SUT VICRYL 0 UR-6 27 IN J603H

## (undated) DEVICE — INTENDED FOR TISSUE SEPARATION, AND OTHER PROCEDURES THAT REQUIRE A SHARP SURGICAL BLADE TO PUNCTURE OR CUT.: Brand: BARD-PARKER SAFETY BLADES SIZE 15, STERILE

## (undated) DEVICE — UNIVERSAL STAPLER: Brand: ENDO GIA ULTRA

## (undated) DEVICE — ULTRASOUND GEL STERILE FOIL PK

## (undated) DEVICE — STERILE ICS MINOR PACK: Brand: CARDINAL HEALTH

## (undated) DEVICE — GLOVE INDICATOR PI UNDERGLOVE SZ 7 BLUE

## (undated) DEVICE — 3M™ STERI-STRIP™ REINFORCED ADHESIVE SKIN CLOSURES, R1547, 1/2 IN X 4 IN (12 MM X 100 MM), 6 STRIPS/ENVELOPE: Brand: 3M™ STERI-STRIP™

## (undated) DEVICE — SUT VICRYL 3-0 SH 27 IN J416H

## (undated) DEVICE — CURVED JAW CORDLESS ULTRASONIC DISSECTOR: Brand: SONICISION 7

## (undated) DEVICE — SUT MONOCRYL 4-0 PS-2 27 IN Y426H

## (undated) DEVICE — DECANTER: Brand: UNBRANDED

## (undated) DEVICE — ENDOPATH ETS-FLEX45 ARTICULATING ENDOSCOPIC LINEAR CUTTER, NO RELOAD: Brand: ENDOPATH

## (undated) DEVICE — LIGHT HANDLE COVER SLEEVE DISP BLUE STELLAR

## (undated) DEVICE — SPECIMEN CONTAINER STERILE PEEL PACK

## (undated) DEVICE — NEEDLE 23G X 1 1/2 SAFETY-GLIDE THIN WALL

## (undated) DEVICE — BETHLEHEM UNIVERSAL MINOR GEN: Brand: CARDINAL HEALTH

## (undated) DEVICE — CHLORAPREP HI-LITE 26ML ORANGE

## (undated) DEVICE — TUBING SUCTION 5MM X 12 FT

## (undated) DEVICE — TOWEL SURG XR DETECT GREEN STRL RFD

## (undated) DEVICE — SMOKE EVACUATION TUBING WITH 8 IN INTEGRAL WAND AND SPONGE GUARD: Brand: BUFFALO FILTER

## (undated) DEVICE — VIAL DECANTER

## (undated) DEVICE — TRAY FOLEY 16FR URIMETER SURESTEP

## (undated) DEVICE — TROCAR: Brand: KII® SLEEVE

## (undated) DEVICE — GLOVE SRG BIOGEL ORTHOPEDIC 8

## (undated) DEVICE — SPONGE 4 X 4 XRAY 16 PLY STRL LF RFD

## (undated) DEVICE — SURGICEL SNOW 1 X 2 IN

## (undated) DEVICE — MINOR PROCEDURE DRAPE: Brand: CONVERTORS

## (undated) DEVICE — ADHESIVE SKIN HIGH VISCOSITY EXOFIN 1ML

## (undated) DEVICE — ARTICULATION RELOAD WITH TRI-STAPLE TECHNOLOGY: Brand: ENDO GIA

## (undated) DEVICE — NEEDLE 25G X 1 1/2

## (undated) DEVICE — HARMONIC FOCUS SHEARS 9CM LENGTH + ADAPTIVE TISSUE TECHNOLOGY FOR USE WITH BLUE HAND PIECE ONLY: Brand: HARMONIC FOCUS

## (undated) DEVICE — DRAPE TOWEL: Brand: CONVERTORS

## (undated) DEVICE — EXOFIN PRECISION PEN HIGH VISCOSITY TOPICAL SKIN ADHESIVE: Brand: EXOFIN PRECISION PEN, 1G

## (undated) DEVICE — TISSUE RETRIEVAL SYSTEM: Brand: INZII RETRIEVAL SYSTEM

## (undated) DEVICE — HARMONIC 1100 SHEARS, 36CM SHAFT LENGTH: Brand: HARMONIC

## (undated) DEVICE — PENCIL ELECTROSURG E-Z CLEAN -0035H

## (undated) DEVICE — PAD GROUNDING ADULT

## (undated) DEVICE — MEDI-VAC YANK SUCT HNDL W/TPRD BULBOUS TIP: Brand: CARDINAL HEALTH

## (undated) DEVICE — INTENDED FOR TISSUE SEPARATION, AND OTHER PROCEDURES THAT REQUIRE A SHARP SURGICAL BLADE TO PUNCTURE OR CUT.: Brand: BARD-PARKER SAFETY BLADES SIZE 11, STERILE

## (undated) DEVICE — BAG DECANTER

## (undated) DEVICE — ETS45 RELOAD STANDARD 45MM: Brand: ENDOPATH

## (undated) DEVICE — SUT MONOCRYL 5-0 P-3 18 IN Y493G